# Patient Record
Sex: MALE | Race: OTHER | Employment: OTHER
[De-identification: names, ages, dates, MRNs, and addresses within clinical notes are randomized per-mention and may not be internally consistent; named-entity substitution may affect disease eponyms.]

---

## 2017-02-14 ENCOUNTER — OFFICE VISIT (OUTPATIENT)
Facility: CLINIC | Age: 77
End: 2017-02-14

## 2017-02-14 VITALS
WEIGHT: 191 LBS | TEMPERATURE: 98 F | SYSTOLIC BLOOD PRESSURE: 130 MMHG | HEART RATE: 115 BPM | OXYGEN SATURATION: 97 % | DIASTOLIC BLOOD PRESSURE: 68 MMHG | BODY MASS INDEX: 29.04 KG/M2

## 2017-02-14 DIAGNOSIS — Z79.4 TYPE 2 DIABETES MELLITUS WITHOUT COMPLICATION, WITH LONG-TERM CURRENT USE OF INSULIN (HCC): ICD-10-CM

## 2017-02-14 DIAGNOSIS — E78.2 MIXED HYPERLIPIDEMIA: ICD-10-CM

## 2017-02-14 DIAGNOSIS — I10 ESSENTIAL HYPERTENSION: ICD-10-CM

## 2017-02-14 DIAGNOSIS — J20.9 ACUTE BRONCHITIS, UNSPECIFIED ORGANISM: Primary | ICD-10-CM

## 2017-02-14 DIAGNOSIS — R05.9 COUGH: ICD-10-CM

## 2017-02-14 DIAGNOSIS — E66.3 OVERWEIGHT (BMI 25.0-29.9): ICD-10-CM

## 2017-02-14 DIAGNOSIS — M15.9 PRIMARY OSTEOARTHRITIS INVOLVING MULTIPLE JOINTS: ICD-10-CM

## 2017-02-14 DIAGNOSIS — E11.9 TYPE 2 DIABETES MELLITUS WITHOUT COMPLICATION, WITH LONG-TERM CURRENT USE OF INSULIN (HCC): ICD-10-CM

## 2017-02-14 PROCEDURE — 3046F HEMOGLOBIN A1C LEVEL >9.0%: CPT | Performed by: FAMILY MEDICINE

## 2017-02-14 PROCEDURE — 99213 OFFICE O/P EST LOW 20 MIN: CPT | Performed by: FAMILY MEDICINE

## 2017-02-14 PROCEDURE — G8598 ASA/ANTIPLAT THER USED: HCPCS | Performed by: FAMILY MEDICINE

## 2017-02-14 PROCEDURE — G8482 FLU IMMUNIZE ORDER/ADMIN: HCPCS | Performed by: FAMILY MEDICINE

## 2017-02-14 PROCEDURE — 4040F PNEUMOC VAC/ADMIN/RCVD: CPT | Performed by: FAMILY MEDICINE

## 2017-02-14 PROCEDURE — G8420 CALC BMI NORM PARAMETERS: HCPCS | Performed by: FAMILY MEDICINE

## 2017-02-14 PROCEDURE — 1123F ACP DISCUSS/DSCN MKR DOCD: CPT | Performed by: FAMILY MEDICINE

## 2017-02-14 PROCEDURE — 3017F COLORECTAL CA SCREEN DOC REV: CPT | Performed by: FAMILY MEDICINE

## 2017-02-14 PROCEDURE — G8427 DOCREV CUR MEDS BY ELIG CLIN: HCPCS | Performed by: FAMILY MEDICINE

## 2017-02-14 PROCEDURE — 1036F TOBACCO NON-USER: CPT | Performed by: FAMILY MEDICINE

## 2017-02-14 RX ORDER — AZITHROMYCIN 250 MG/1
TABLET, FILM COATED ORAL
Qty: 1 PACKET | Refills: 0 | Status: SHIPPED | OUTPATIENT
Start: 2017-02-14 | End: 2017-02-24

## 2017-03-06 ENCOUNTER — OFFICE VISIT (OUTPATIENT)
Facility: CLINIC | Age: 77
End: 2017-03-06

## 2017-03-06 VITALS
HEART RATE: 67 BPM | WEIGHT: 195 LBS | DIASTOLIC BLOOD PRESSURE: 60 MMHG | OXYGEN SATURATION: 97 % | TEMPERATURE: 97.5 F | SYSTOLIC BLOOD PRESSURE: 130 MMHG | BODY MASS INDEX: 29.65 KG/M2

## 2017-03-06 DIAGNOSIS — I25.10 CORONARY ARTERY DISEASE INVOLVING NATIVE CORONARY ARTERY OF NATIVE HEART WITHOUT ANGINA PECTORIS: ICD-10-CM

## 2017-03-06 DIAGNOSIS — Z00.00 MEDICARE ANNUAL WELLNESS VISIT, INITIAL: Primary | ICD-10-CM

## 2017-03-06 DIAGNOSIS — I10 ESSENTIAL HYPERTENSION: ICD-10-CM

## 2017-03-06 DIAGNOSIS — E66.3 OVERWEIGHT (BMI 25.0-29.9): ICD-10-CM

## 2017-03-06 DIAGNOSIS — M15.9 PRIMARY OSTEOARTHRITIS INVOLVING MULTIPLE JOINTS: ICD-10-CM

## 2017-03-06 DIAGNOSIS — E78.2 MIXED HYPERLIPIDEMIA: ICD-10-CM

## 2017-03-06 DIAGNOSIS — Z00.00 ROUTINE GENERAL MEDICAL EXAMINATION AT A HEALTH CARE FACILITY: ICD-10-CM

## 2017-03-06 DIAGNOSIS — E11.9 TYPE 2 DIABETES MELLITUS WITHOUT COMPLICATION, WITH LONG-TERM CURRENT USE OF INSULIN (HCC): ICD-10-CM

## 2017-03-06 DIAGNOSIS — Z79.4 TYPE 2 DIABETES MELLITUS WITHOUT COMPLICATION, WITH LONG-TERM CURRENT USE OF INSULIN (HCC): ICD-10-CM

## 2017-03-06 DIAGNOSIS — Z23 NEED FOR PROPHYLACTIC VACCINATION AGAINST STREPTOCOCCUS PNEUMONIAE (PNEUMOCOCCUS): ICD-10-CM

## 2017-03-06 LAB — HBA1C MFR BLD: 8.3 %

## 2017-03-06 PROCEDURE — G0438 PPPS, INITIAL VISIT: HCPCS | Performed by: FAMILY MEDICINE

## 2017-03-06 PROCEDURE — 90670 PCV13 VACCINE IM: CPT | Performed by: FAMILY MEDICINE

## 2017-03-06 PROCEDURE — G0009 ADMIN PNEUMOCOCCAL VACCINE: HCPCS | Performed by: FAMILY MEDICINE

## 2017-03-06 PROCEDURE — 83036 HEMOGLOBIN GLYCOSYLATED A1C: CPT | Performed by: FAMILY MEDICINE

## 2017-03-06 RX ORDER — LEVOTHYROXINE SODIUM 88 UG/1
TABLET ORAL
COMMUNITY
Start: 2017-01-27 | End: 2017-09-06 | Stop reason: SDUPTHER

## 2017-03-06 ASSESSMENT — LIFESTYLE VARIABLES: HOW OFTEN DO YOU HAVE A DRINK CONTAINING ALCOHOL: 0

## 2017-03-06 ASSESSMENT — PATIENT HEALTH QUESTIONNAIRE - PHQ9: SUM OF ALL RESPONSES TO PHQ QUESTIONS 1-9: 0

## 2017-03-06 ASSESSMENT — ANXIETY QUESTIONNAIRES: GAD7 TOTAL SCORE: 0

## 2017-07-12 ENCOUNTER — HOSPITAL ENCOUNTER (OUTPATIENT)
Age: 77
Discharge: HOME | End: 2017-07-12
Payer: MEDICARE

## 2017-07-12 LAB — PROSTATE SPECIFIC ANTIGEN: 0.45 UG/L

## 2017-07-12 PROCEDURE — 36415 COLL VENOUS BLD VENIPUNCTURE: CPT

## 2017-07-12 PROCEDURE — 84153 ASSAY OF PSA TOTAL: CPT

## 2017-11-27 ENCOUNTER — HOSPITAL ENCOUNTER (OUTPATIENT)
Age: 77
Discharge: HOME | End: 2017-11-27
Payer: MEDICARE

## 2017-11-27 DIAGNOSIS — Z11.59 NEED FOR HEPATITIS C SCREENING TEST: ICD-10-CM

## 2017-11-27 LAB
HEPATITIS C ANTIBODY: NONREACTIVE
VITAMIN D 25-HYDROXY: 39.4 NG/ML (ref 30–100)

## 2017-11-27 PROCEDURE — 84439 ASSAY OF FREE THYROXINE: CPT

## 2017-11-27 PROCEDURE — 36415 COLL VENOUS BLD VENIPUNCTURE: CPT

## 2017-11-27 PROCEDURE — 82043 UR ALBUMIN QUANTITATIVE: CPT

## 2017-11-27 PROCEDURE — 82570 ASSAY OF URINE CREATININE: CPT

## 2017-11-27 PROCEDURE — 80053 COMPREHEN METABOLIC PANEL: CPT

## 2017-11-27 PROCEDURE — 86803 HEPATITIS C AB TEST: CPT

## 2017-11-27 PROCEDURE — 84443 ASSAY THYROID STIM HORMONE: CPT

## 2017-11-27 PROCEDURE — 82306 VITAMIN D 25 HYDROXY: CPT

## 2017-11-27 PROCEDURE — 80061 LIPID PANEL: CPT

## 2019-01-04 ENCOUNTER — HOSPITAL ENCOUNTER (OUTPATIENT)
Age: 79
Discharge: HOME | End: 2019-01-04
Payer: MEDICARE

## 2019-01-04 LAB
ALBUMIN SERPL-MCNC: 4.1 G/DL (ref 3.5–5.2)
ALBUMIN/GLOBULIN RATIO: ABNORMAL (ref 1–2.5)
ALP BLD-CCNC: 59 U/L (ref 40–129)
ALT SERPL-CCNC: 22 U/L (ref 5–41)
ANION GAP SERPL CALCULATED.3IONS-SCNC: 12 MMOL/L (ref 9–17)
AST SERPL-CCNC: 18 U/L
BILIRUB SERPL-MCNC: 0.73 MG/DL (ref 0.3–1.2)
BUN BLDV-MCNC: 20 MG/DL (ref 8–23)
BUN/CREAT BLD: ABNORMAL (ref 9–20)
CALCIUM SERPL-MCNC: 9.1 MG/DL (ref 8.6–10.4)
CHLORIDE BLD-SCNC: 101 MMOL/L (ref 98–107)
CHOLESTEROL/HDL RATIO: 2.7
CHOLESTEROL: 142 MG/DL
CO2: 25 MMOL/L (ref 20–31)
CREAT SERPL-MCNC: 1.3 MG/DL (ref 0.7–1.2)
CREATININE URINE: 172.8 MG/DL (ref 39–259)
GFR AFRICAN AMERICAN: >60 ML/MIN
GFR NON-AFRICAN AMERICAN: 54 ML/MIN
GFR SERPL CREATININE-BSD FRML MDRD: ABNORMAL ML/MIN/{1.73_M2}
GFR SERPL CREATININE-BSD FRML MDRD: ABNORMAL ML/MIN/{1.73_M2}
GLUCOSE BLD-MCNC: 90 MG/DL (ref 70–99)
HCT VFR BLD CALC: 41.2 % (ref 41–53)
HDLC SERPL-MCNC: 53 MG/DL
HEMOGLOBIN: 13.7 G/DL (ref 13.5–17.5)
LDL CHOLESTEROL: 72 MG/DL (ref 0–130)
MCH RBC QN AUTO: 30.6 PG (ref 26–34)
MCHC RBC AUTO-ENTMCNC: 33.3 G/DL (ref 31–37)
MCV RBC AUTO: 91.9 FL (ref 80–100)
MICROALBUMIN/CREAT 24H UR: 14 MG/L
MICROALBUMIN/CREAT UR-RTO: 8 MCG/MG CREAT
NRBC AUTOMATED: ABNORMAL PER 100 WBC
PDW BLD-RTO: 14.1 % (ref 11.5–14.9)
PLATELET # BLD: 191 K/UL (ref 150–450)
PMV BLD AUTO: 7.5 FL (ref 6–12)
POTASSIUM SERPL-SCNC: 4.2 MMOL/L (ref 3.7–5.3)
PROSTATE SPECIFIC ANTIGEN: 0.64 UG/L
RBC # BLD: 4.49 M/UL (ref 4.5–5.9)
SODIUM BLD-SCNC: 138 MMOL/L (ref 135–144)
THYROXINE, FREE: 1.27 NG/DL (ref 0.93–1.7)
TOTAL PROTEIN: 7.3 G/DL (ref 6.4–8.3)
TRIGL SERPL-MCNC: 87 MG/DL
TSH SERPL DL<=0.05 MIU/L-ACNC: 5.38 MIU/L (ref 0.3–5)
VITAMIN B-12: 650 PG/ML (ref 232–1245)
VITAMIN D 25-HYDROXY: 47.6 NG/ML (ref 30–100)
VLDLC SERPL CALC-MCNC: NORMAL MG/DL (ref 1–30)
WBC # BLD: 5.7 K/UL (ref 3.5–11)

## 2019-01-04 PROCEDURE — 80053 COMPREHEN METABOLIC PANEL: CPT

## 2019-01-04 PROCEDURE — 82607 VITAMIN B-12: CPT

## 2019-01-04 PROCEDURE — 82306 VITAMIN D 25 HYDROXY: CPT

## 2019-01-04 PROCEDURE — 84439 ASSAY OF FREE THYROXINE: CPT

## 2019-01-04 PROCEDURE — 84443 ASSAY THYROID STIM HORMONE: CPT

## 2019-01-04 PROCEDURE — G0103 PSA SCREENING: HCPCS

## 2019-01-04 PROCEDURE — 80061 LIPID PANEL: CPT

## 2019-01-04 PROCEDURE — 85027 COMPLETE CBC AUTOMATED: CPT

## 2019-01-04 PROCEDURE — 82570 ASSAY OF URINE CREATININE: CPT

## 2019-01-04 PROCEDURE — 36415 COLL VENOUS BLD VENIPUNCTURE: CPT

## 2019-01-04 PROCEDURE — 82043 UR ALBUMIN QUANTITATIVE: CPT

## 2019-08-30 ENCOUNTER — HOSPITAL ENCOUNTER (EMERGENCY)
Age: 79
Discharge: HOME | End: 2019-08-30
Attending: EMERGENCY MEDICINE
Payer: MEDICARE

## 2019-08-30 ENCOUNTER — APPOINTMENT (OUTPATIENT)
Dept: GENERAL RADIOLOGY | Age: 79
End: 2019-08-30
Payer: MEDICARE

## 2019-08-30 VITALS
HEIGHT: 69 IN | DIASTOLIC BLOOD PRESSURE: 74 MMHG | TEMPERATURE: 97.7 F | BODY MASS INDEX: 28.58 KG/M2 | WEIGHT: 193 LBS | OXYGEN SATURATION: 98 % | SYSTOLIC BLOOD PRESSURE: 152 MMHG | HEART RATE: 86 BPM | RESPIRATION RATE: 16 BRPM

## 2019-08-30 DIAGNOSIS — R42 DIZZINESS: ICD-10-CM

## 2019-08-30 DIAGNOSIS — I10 HYPERTENSION, UNSPECIFIED TYPE: Primary | ICD-10-CM

## 2019-08-30 LAB
ABSOLUTE EOS #: 0.3 K/UL (ref 0–0.4)
ABSOLUTE IMMATURE GRANULOCYTE: ABNORMAL K/UL (ref 0–0.3)
ABSOLUTE LYMPH #: 1.2 K/UL (ref 1–4.8)
ABSOLUTE MONO #: 0.5 K/UL (ref 0.1–1.3)
ANION GAP SERPL CALCULATED.3IONS-SCNC: 12 MMOL/L (ref 9–17)
BASOPHILS # BLD: 0 % (ref 0–2)
BASOPHILS ABSOLUTE: 0 K/UL (ref 0–0.2)
BUN BLDV-MCNC: 16 MG/DL (ref 8–23)
BUN/CREAT BLD: ABNORMAL (ref 9–20)
CALCIUM SERPL-MCNC: 9.6 MG/DL (ref 8.6–10.4)
CHLORIDE BLD-SCNC: 104 MMOL/L (ref 98–107)
CO2: 23 MMOL/L (ref 20–31)
CREAT SERPL-MCNC: 1.03 MG/DL (ref 0.7–1.2)
DIFFERENTIAL TYPE: ABNORMAL
EOSINOPHILS RELATIVE PERCENT: 4 % (ref 0–4)
GFR AFRICAN AMERICAN: >60 ML/MIN
GFR NON-AFRICAN AMERICAN: >60 ML/MIN
GFR SERPL CREATININE-BSD FRML MDRD: ABNORMAL ML/MIN/{1.73_M2}
GFR SERPL CREATININE-BSD FRML MDRD: ABNORMAL ML/MIN/{1.73_M2}
GLUCOSE BLD-MCNC: 151 MG/DL (ref 70–99)
HCT VFR BLD CALC: 39.6 % (ref 41–53)
HEMOGLOBIN: 13.2 G/DL (ref 13.5–17.5)
IMMATURE GRANULOCYTES: ABNORMAL %
LYMPHOCYTES # BLD: 19 % (ref 24–44)
MCH RBC QN AUTO: 30.8 PG (ref 26–34)
MCHC RBC AUTO-ENTMCNC: 33.3 G/DL (ref 31–37)
MCV RBC AUTO: 92.4 FL (ref 80–100)
MONOCYTES # BLD: 8 % (ref 1–7)
NRBC AUTOMATED: ABNORMAL PER 100 WBC
PDW BLD-RTO: 14 % (ref 11.5–14.9)
PLATELET # BLD: 182 K/UL (ref 150–450)
PLATELET ESTIMATE: ABNORMAL
PMV BLD AUTO: 7 FL (ref 6–12)
POTASSIUM SERPL-SCNC: 4.5 MMOL/L (ref 3.7–5.3)
RBC # BLD: 4.29 M/UL (ref 4.5–5.9)
RBC # BLD: ABNORMAL 10*6/UL
SEG NEUTROPHILS: 69 % (ref 36–66)
SEGMENTED NEUTROPHILS ABSOLUTE COUNT: 4.5 K/UL (ref 1.3–9.1)
SODIUM BLD-SCNC: 139 MMOL/L (ref 135–144)
TROPONIN INTERP: NORMAL
TROPONIN INTERP: NORMAL
TROPONIN T: NORMAL NG/ML
TROPONIN T: NORMAL NG/ML
TROPONIN, HIGH SENSITIVITY: 14 NG/L (ref 0–22)
TROPONIN, HIGH SENSITIVITY: 15 NG/L (ref 0–22)
WBC # BLD: 6.5 K/UL (ref 3.5–11)
WBC # BLD: ABNORMAL 10*3/UL

## 2019-08-30 PROCEDURE — 80048 BASIC METABOLIC PNL TOTAL CA: CPT

## 2019-08-30 PROCEDURE — 36415 COLL VENOUS BLD VENIPUNCTURE: CPT

## 2019-08-30 PROCEDURE — 71046 X-RAY EXAM CHEST 2 VIEWS: CPT

## 2019-08-30 PROCEDURE — 84484 ASSAY OF TROPONIN QUANT: CPT

## 2019-08-30 PROCEDURE — 85025 COMPLETE CBC W/AUTO DIFF WBC: CPT

## 2019-08-30 PROCEDURE — 93005 ELECTROCARDIOGRAM TRACING: CPT | Performed by: STUDENT IN AN ORGANIZED HEALTH CARE EDUCATION/TRAINING PROGRAM

## 2019-08-30 PROCEDURE — 99284 EMERGENCY DEPT VISIT MOD MDM: CPT

## 2019-08-30 NOTE — ED NOTES
Mode of arrival: walk-in        Chief complaints: hypertension         Scenario: pt states he was experiencing lightheadedness and dizziness intermittently yesterday during the day at when he was up to the restroom at night. Pt states this morning he had some dizziness around 0800, but states since then he hasn't had any dizziness. Pt states last night he checked his blood pressure and it was reading about 170/80 pt states he never has BP readings this high. Pt states this morning he also checked his BP and had a similar reading. Pt states he was supposed to have a cardiologist appointment this morning, but didn't go because he wasn't feeling well. Pt denies any abdominal pain, chest pain, or SOB. Pt is alert and oriented x4.         C= \"Have you ever felt that you should Cut down on your drinking? \"  No  A= \"Have people Annoyed you by criticizing your drinking? \"  No  G= \"Have you ever felt bad or Guilty about your drinking? \"  No  E= \"Have you ever had a drink as an Eye-opener first thing in the morning to steady your nerves or to help a hangover? \"  No      Deferred []      Reason for deferring: N/A    *If yes to two or more: probable alcohol abuse. Ronnell Hermosillo RN  08/30/19 9455

## 2019-08-30 NOTE — ED PROVIDER NOTES
there is a 20 systolic difference. HENT: normocephalic , nose normal   EYES: no occular discharge, no scleral icterus  NECK: no JVD, no tracheal deviation  CV: Normal S1 S2, no MRG  PULM / CHEST: CTA Bilaterally all fields, no WRR  ABDOMEN: SNTND, No peritoneal signs   / ANORECTAL: deferred  MSK: no gross deformity, no edema, no TTP  NEURO:   Neurologic:  Cranial Nerves:              CNII: Visual acuity normal, Visual fields full to confrontation              CNIII, IV, VI: Pupils equal, round and reactive to light, full extraoccular movements without nystagmus              CN V: Facial sensation intact bilaterally to fine touch and pinprick, masseter 5/5              CN VII: Facial muscles symmetric and strong              CN VIII: Hears finger rub well bilaterally              CN IX: Deferred              CN X: Palate elevates symmetrically              CN XI: Full strength shoulder shrug bilaterally              CN XII: Tongue protrusion full and midline  Sensation: Sensation is intact to proprioception, two point discrimination, and light touch  Cerebellar: Heel to shin intact bilaterally  Gait: Patient's gait is normal not ataxic wide-based, no shuffling. Muscle Strength:  + 5 / 5 Strength to LUE flexion, Extension  + 5 / 5 Strength to RUE flexion , Extension  + 5 / 5 Strength to flexion & extension of Left Hip leg plantar and dorsi flexion  + 5 / 5 Strength to flexion & extension of Right Hip leg plantar and dorsi flexion  No weakness upon cervical flexion to indicate critical spinal stenosis  SKIN: no rash, no erythema, cap refill < 2 sec, No LE pitting edema, No unilateral calf swelling or TTP, Negative Rolando Sign BLE.  Pulses symmetric BUE Radial.   PSYCH / BEHAVIORAL: mood/affect normal, behavior normal, no flight of ideas        DIFFERENTIAL  DIAGNOSIS / MDM   PLAN (LABS / IMAGING / EKG):  Orders Placed This Encounter   Procedures    XR CHEST STANDARD (2 VW)    CBC Auto Differential    Basic effects. PATIENT REFERRED TO:  Rene Angel, [address] [facility]  [address] [facility],[address]  [address] [facility] [address] [phone]    Schedule an appointment as soon as possible for a visit in 2 days  Return to the ER if worsening or any other concern      DISCHARGE MEDICATIONS:  New Prescriptions    No medications on file       Dr. Margaret Mota.  1966 [facility]  Emergency Medicine Resident Physician, PGY-2    (Please note that portions of this note were completed with a voice recognition program.  Efforts were made to edit the dictations but occasionally words are mis-transcribed.)        Janett Goldmann, DO  Resident  08/30/19 0040

## 2019-08-30 NOTE — ED NOTES
Report given to Asha Murillo RN from ED. Report method in person   The following was reviewed with receiving RN:   Current vital signs:  BP (!) 183/71   Pulse 59   Temp 97.7 °F (36.5 °C) (Oral)   Resp 16   Ht 5' 9\" (1.753 m)   Wt 193 lb (87.5 kg)   SpO2 98%   BMI 28.50 kg/m²                MEWS Score: 1     Any medication or safety alerts were reviewed. Any pending diagnostics and notifications were also reviewed, as well as any safety concerns or issues, abnormal labs, abnormal imaging, and abnormal assessment findings. Questions were answered.             Victoria Moon RN  08/30/19 7341

## 2019-08-30 NOTE — ED PROVIDER NOTES
[address] ED  eMERGENCY dEPARTMENT eNCOUnter   Attending Attestation     Pt Name: Yuki Mathur  MRN: [id number]  [place] 1940  Date of evaluation: 8/30/19    History, EXAM, MDM:    Yuki Mathur is a 70 y.o. male who presents with Dizziness and Hypertension  Elevated BP. Had dizziness earlier today now resolved. No other stroke symptoms. No vision changes. No headache. No CP. No SOB. Exam BP now 150's / 74. EKG shows a sinus rhythm. HR is 63, , QRS 82, , no ROMI, No STD, diffuse TW flattening, the axis is normal.  CXR normal.  Laboraotry studies show no significant anemia. No renal failure. No electrolyte abnromalities. No troponin elevation. Pt remains asymptomatic. D/w him warning signs for urgent ED return and improtance of close outpatient follow up. Pt verbalized understanding and all questions were answered. Vitals:   Vitals:    08/30/19 1730 08/30/19 1732 08/30/19 1900 08/30/19 1936   BP: 115/66 (!) 189/66 (!) 183/71 (!) 152/74   Pulse:    86   Resp:       Temp:       TempSrc:       SpO2:  97% 98%    Weight:       Height:         I performed a history and physical examination of the patient and discussed management with the resident. I reviewed the residents note and agree with the documented findings and plan of care. Any areas of disagreement are noted on the chart. I was personally present for the key portions of any procedures. I have documented in the chart those procedures where I was not present during the key portions. I have personally reviewed all images and agree with the resident's interpretation. I have reviewed the emergency nurses triage note. I agree with the chief complaint, past medical history, past surgical history, allergies, medications, social and family history as documented unless otherwise noted below.  Documentation of the HPI, Physical Exam and Medical Decision Making performed by medical students or scribes is based on my personal performance of the HPI, PE and MDM. For Phys Assistant/ Nurse Practitioner cases/documentation I have had a face to face evaluation of this patient and have completed at least one if not all key elements of the E/M (history, physical exam, and MDM). Additional findings are as noted.     Nyla Nolasco MD  Attending Emergency  Physician                Nyla Nolasco MD  08/30/19 1954       Nyla Nolasco MD  08/30/19 2003

## 2019-08-31 LAB
EKG ATRIAL RATE: 63 BPM
EKG P AXIS: 24 DEGREES
EKG P-R INTERVAL: 174 MS
EKG Q-T INTERVAL: 394 MS
EKG QRS DURATION: 82 MS
EKG QTC CALCULATION (BAZETT): 403 MS
EKG R AXIS: -14 DEGREES
EKG T AXIS: 36 DEGREES
EKG VENTRICULAR RATE: 63 BPM

## 2019-08-31 PROCEDURE — 93010 ELECTROCARDIOGRAM REPORT: CPT | Performed by: INTERNAL MEDICINE

## 2019-09-26 ENCOUNTER — HOSPITAL ENCOUNTER (OUTPATIENT)
Dept: NUCLEAR MEDICINE | Age: 79
Discharge: HOME | End: 2019-09-28
Payer: MEDICARE

## 2019-09-26 ENCOUNTER — HOSPITAL ENCOUNTER (OUTPATIENT)
Dept: NON INVASIVE DIAGNOSTICS | Age: 79
Discharge: HOME | End: 2019-09-26
Payer: MEDICARE

## 2019-09-26 VITALS — BODY MASS INDEX: 29.86 KG/M2 | HEIGHT: 68 IN | WEIGHT: 197 LBS

## 2019-09-26 DIAGNOSIS — I25.10 CORONARY ARTERY DISEASE INVOLVING NATIVE HEART WITHOUT ANGINA PECTORIS, UNSPECIFIED VESSEL OR LESION TYPE: ICD-10-CM

## 2019-09-26 DIAGNOSIS — Z95.1 PRESENCE OF AORTOCORONARY BYPASS GRAFT: ICD-10-CM

## 2019-09-26 DIAGNOSIS — I49.3 PVC (PREMATURE VENTRICULAR CONTRACTION): ICD-10-CM

## 2019-09-26 LAB
LV EF: 50 %
LV EF: 55 %
LVEF MODALITY: NORMAL
LVEF MODALITY: NORMAL

## 2019-09-26 PROCEDURE — 6360000002 HC RX W HCPCS: Performed by: INTERNAL MEDICINE

## 2019-09-26 PROCEDURE — 93017 CV STRESS TEST TRACING ONLY: CPT

## 2019-09-26 PROCEDURE — 2580000003 HC RX 258: Performed by: INTERNAL MEDICINE

## 2019-09-26 PROCEDURE — A9500 TC99M SESTAMIBI: HCPCS | Performed by: INTERNAL MEDICINE

## 2019-09-26 PROCEDURE — C8929 TTE W OR WO FOL WCON,DOPPLER: HCPCS

## 2019-09-26 PROCEDURE — 6360000004 HC RX CONTRAST MEDICATION: Performed by: INTERNAL MEDICINE

## 2019-09-26 PROCEDURE — 78452 HT MUSCLE IMAGE SPECT MULT: CPT

## 2019-09-26 PROCEDURE — 3430000000 HC RX DIAGNOSTIC RADIOPHARMACEUTICAL: Performed by: INTERNAL MEDICINE

## 2019-09-26 RX ORDER — SODIUM CHLORIDE 9 MG/ML
500 INJECTION, SOLUTION INTRAVENOUS CONTINUOUS PRN
Status: ACTIVE | OUTPATIENT
Start: 2019-09-26 | End: 2019-09-26

## 2019-09-26 RX ORDER — ALBUTEROL SULFATE 90 UG/1
2 AEROSOL, METERED RESPIRATORY (INHALATION) PRN
Status: ACTIVE | OUTPATIENT
Start: 2019-09-26 | End: 2019-09-26

## 2019-09-26 RX ORDER — METOPROLOL TARTRATE 5 MG/5ML
5 INJECTION INTRAVENOUS EVERY 5 MIN PRN
Status: ACTIVE | OUTPATIENT
Start: 2019-09-26 | End: 2019-09-26

## 2019-09-26 RX ORDER — ATROPINE SULFATE 0.1 MG/ML
0.5 INJECTION INTRAVENOUS EVERY 5 MIN PRN
Status: ACTIVE | OUTPATIENT
Start: 2019-09-26 | End: 2019-09-26

## 2019-09-26 RX ORDER — NITROGLYCERIN 0.4 MG/1
0.4 TABLET SUBLINGUAL EVERY 5 MIN PRN
Status: ACTIVE | OUTPATIENT
Start: 2019-09-26 | End: 2019-09-26

## 2019-09-26 RX ORDER — SODIUM CHLORIDE 0.9 % (FLUSH) 0.9 %
10 SYRINGE (ML) INJECTION PRN
Status: DISCONTINUED | OUTPATIENT
Start: 2019-09-26 | End: 2019-09-29 | Stop reason: HOSPADM

## 2019-09-26 RX ORDER — AMINOPHYLLINE DIHYDRATE 25 MG/ML
50 INJECTION, SOLUTION INTRAVENOUS PRN
Status: ACTIVE | OUTPATIENT
Start: 2019-09-26 | End: 2019-09-26

## 2019-09-26 RX ORDER — SODIUM CHLORIDE 0.9 % (FLUSH) 0.9 %
10 SYRINGE (ML) INJECTION PRN
Status: ACTIVE | OUTPATIENT
Start: 2019-09-26 | End: 2019-09-26

## 2019-09-26 RX ADMIN — REGADENOSON 0.4 MG: 0.08 INJECTION, SOLUTION INTRAVENOUS at 09:19

## 2019-09-26 RX ADMIN — TETRAKIS(2-METHOXYISOBUTYLISOCYANIDE)COPPER(I) TETRAFLUOROBORATE 37.6 MILLICURIE: 1 INJECTION, POWDER, LYOPHILIZED, FOR SOLUTION INTRAVENOUS at 09:21

## 2019-09-26 RX ADMIN — Medication 10 ML: at 08:42

## 2019-09-26 RX ADMIN — PERFLUTREN 2.2 MG: 6.52 INJECTION, SUSPENSION INTRAVENOUS at 08:17

## 2019-09-26 RX ADMIN — TETRAKIS(2-METHOXYISOBUTYLISOCYANIDE)COPPER(I) TETRAFLUOROBORATE 11.7 MILLICURIE: 1 INJECTION, POWDER, LYOPHILIZED, FOR SOLUTION INTRAVENOUS at 07:00

## 2019-09-26 RX ADMIN — Medication 10 ML: at 09:19

## 2019-09-26 RX ADMIN — Medication 10 ML: at 07:00

## 2019-09-26 NOTE — PROGRESS NOTES
PATIENT BROUGHT TO STRESS LAB WAITING ROOM, ECHOCARDIOGRAM DONE AT THIS TIME, WILL DO STRESS TEST FOLLOWING.

## 2019-09-27 NOTE — PROCEDURES
[address] [facility]                    [address] [facility]. [address]                              CARDIAC STRESS TEST    PATIENT NAME: Eliseo Lee                   :        1940  MED REC NO:   [id number]                              ROOM:  ACCOUNT NO:   [de-identified]                           ADMIT DATE: 2019  PROVIDER:     More Mendosa    DATE OF STUDY:  2019    ORDERING PROVIDER:  Melisa Anna MD  PRIMARY CARE PROVIDER:  Chu Gutierrez MD  INTERPRETING PHYSICIAN:  BEAU HARKINS MD    LEXISCAN STRESS TEST    INDICATION:  CAD    INTERPRETATION:  Resting heart rate:  61 bpm.  Resting blood pressure:  145/75 mmhg. Resting Ekg:  Normal.  Stress heart response:  Normal response. Blood pressure response:  Appropriate. Stress Ekgs:  Normal.  No chest pain during stress or recovery. No ischemic Ekg changes. IMPRESSION:  NEGATIVE LEXISCAN STRESS TEST  THE NUCLEAR SCAN TO FOLLOW.           DELMA HARKINS    D: 2019 10:13:28       T: 2019 10:14:37     JUDD/DELICIA  Job#: [id number]     Doc#: Unknown    CC:    (Retain this field even if not dictated or not decipherable)

## 2019-09-30 ENCOUNTER — HOSPITAL ENCOUNTER (OUTPATIENT)
Dept: VASCULAR LAB | Age: 79
Discharge: HOME | End: 2019-09-30
Payer: MEDICARE

## 2019-09-30 PROCEDURE — 76706 US ABDL AORTA SCREEN AAA: CPT

## 2019-09-30 PROCEDURE — 93975 VASCULAR STUDY: CPT

## 2019-10-02 ENCOUNTER — HOSPITAL ENCOUNTER (OUTPATIENT)
Age: 79
Discharge: HOME | End: 2019-10-02
Payer: MEDICARE

## 2019-10-02 LAB
ANION GAP SERPL CALCULATED.3IONS-SCNC: 12 MMOL/L (ref 9–17)
BUN BLDV-MCNC: 24 MG/DL (ref 8–23)
BUN/CREAT BLD: ABNORMAL (ref 9–20)
CALCIUM SERPL-MCNC: 9.4 MG/DL (ref 8.6–10.4)
CHLORIDE BLD-SCNC: 109 MMOL/L (ref 98–107)
CO2: 23 MMOL/L (ref 20–31)
CREAT SERPL-MCNC: 1.32 MG/DL (ref 0.7–1.2)
GFR AFRICAN AMERICAN: >60 ML/MIN
GFR NON-AFRICAN AMERICAN: 53 ML/MIN
GFR SERPL CREATININE-BSD FRML MDRD: ABNORMAL ML/MIN/{1.73_M2}
GFR SERPL CREATININE-BSD FRML MDRD: ABNORMAL ML/MIN/{1.73_M2}
GLUCOSE BLD-MCNC: 183 MG/DL (ref 70–99)
HCT VFR BLD CALC: 40.6 % (ref 41–53)
HEMOGLOBIN: 13.4 G/DL (ref 13.5–17.5)
MCH RBC QN AUTO: 30.5 PG (ref 26–34)
MCHC RBC AUTO-ENTMCNC: 33.1 G/DL (ref 31–37)
MCV RBC AUTO: 92.2 FL (ref 80–100)
NRBC AUTOMATED: ABNORMAL PER 100 WBC
PDW BLD-RTO: 14.3 % (ref 11.5–14.9)
PLATELET # BLD: 193 K/UL (ref 150–450)
PMV BLD AUTO: 7.5 FL (ref 6–12)
POTASSIUM SERPL-SCNC: 5.2 MMOL/L (ref 3.7–5.3)
RBC # BLD: 4.4 M/UL (ref 4.5–5.9)
SODIUM BLD-SCNC: 144 MMOL/L (ref 135–144)
WBC # BLD: 6.6 K/UL (ref 3.5–11)

## 2019-10-02 PROCEDURE — 85027 COMPLETE CBC AUTOMATED: CPT

## 2019-10-02 PROCEDURE — 80048 BASIC METABOLIC PNL TOTAL CA: CPT

## 2019-10-02 PROCEDURE — 36415 COLL VENOUS BLD VENIPUNCTURE: CPT

## 2019-10-04 ENCOUNTER — HOSPITAL ENCOUNTER (OUTPATIENT)
Dept: CARDIAC CATH/INVASIVE PROCEDURES | Age: 79
Setting detail: OBSERVATION
Discharge: HOME | End: 2019-10-05
Attending: INTERNAL MEDICINE | Admitting: INTERNAL MEDICINE
Payer: MEDICARE

## 2019-10-04 DIAGNOSIS — I25.10 CAD IN NATIVE ARTERY: ICD-10-CM

## 2019-10-04 DIAGNOSIS — Z98.890 STATUS POST CARDIAC CATHETERIZATION: Primary | ICD-10-CM

## 2019-10-04 DIAGNOSIS — I25.10 CORONARY ARTERY DISEASE INVOLVING NATIVE CORONARY ARTERY OF NATIVE HEART WITHOUT ANGINA PECTORIS: ICD-10-CM

## 2019-10-04 LAB
ANION GAP SERPL CALCULATED.3IONS-SCNC: 12 MMOL/L (ref 9–17)
BUN BLDV-MCNC: 18 MG/DL (ref 8–23)
BUN/CREAT BLD: 17 (ref 9–20)
CALCIUM SERPL-MCNC: 9.4 MG/DL (ref 8.6–10.4)
CHLORIDE BLD-SCNC: 103 MMOL/L (ref 98–107)
CO2: 22 MMOL/L (ref 20–31)
CREAT SERPL-MCNC: 1.09 MG/DL (ref 0.7–1.2)
GFR AFRICAN AMERICAN: >60 ML/MIN
GFR NON-AFRICAN AMERICAN: >60 ML/MIN
GFR SERPL CREATININE-BSD FRML MDRD: ABNORMAL ML/MIN/{1.73_M2}
GFR SERPL CREATININE-BSD FRML MDRD: ABNORMAL ML/MIN/{1.73_M2}
GLUCOSE BLD-MCNC: 146 MG/DL (ref 75–110)
GLUCOSE BLD-MCNC: 174 MG/DL (ref 70–99)
GLUCOSE BLD-MCNC: 208 MG/DL (ref 75–110)
PLATELET # BLD: 201 K/UL (ref 138–453)
POTASSIUM SERPL-SCNC: 4.4 MMOL/L (ref 3.7–5.3)
SODIUM BLD-SCNC: 137 MMOL/L (ref 135–144)

## 2019-10-04 PROCEDURE — 2580000003 HC RX 258: Performed by: INTERNAL MEDICINE

## 2019-10-04 PROCEDURE — 85049 AUTOMATED PLATELET COUNT: CPT

## 2019-10-04 PROCEDURE — 6370000000 HC RX 637 (ALT 250 FOR IP)

## 2019-10-04 PROCEDURE — C1874 STENT, COATED/COV W/DEL SYS: HCPCS

## 2019-10-04 PROCEDURE — 6360000002 HC RX W HCPCS

## 2019-10-04 PROCEDURE — 6370000000 HC RX 637 (ALT 250 FOR IP): Performed by: INTERNAL MEDICINE

## 2019-10-04 PROCEDURE — 93458 L HRT ARTERY/VENTRICLE ANGIO: CPT | Performed by: INTERNAL MEDICINE

## 2019-10-04 PROCEDURE — C1781 MESH (IMPLANTABLE): HCPCS

## 2019-10-04 PROCEDURE — G0378 HOSPITAL OBSERVATION PER HR: HCPCS

## 2019-10-04 PROCEDURE — C1725 CATH, TRANSLUMIN NON-LASER: HCPCS

## 2019-10-04 PROCEDURE — 2580000003 HC RX 258

## 2019-10-04 PROCEDURE — C1894 INTRO/SHEATH, NON-LASER: HCPCS

## 2019-10-04 PROCEDURE — C9604 PERC D-E COR REVASC T CABG S: HCPCS | Performed by: INTERNAL MEDICINE

## 2019-10-04 PROCEDURE — C1887 CATHETER, GUIDING: HCPCS

## 2019-10-04 PROCEDURE — 2500000003 HC RX 250 WO HCPCS

## 2019-10-04 PROCEDURE — C1769 GUIDE WIRE: HCPCS

## 2019-10-04 PROCEDURE — 82947 ASSAY GLUCOSE BLOOD QUANT: CPT

## 2019-10-04 PROCEDURE — 80048 BASIC METABOLIC PNL TOTAL CA: CPT

## 2019-10-04 PROCEDURE — 2709999900 HC NON-CHARGEABLE SUPPLY

## 2019-10-04 PROCEDURE — 6360000004 HC RX CONTRAST MEDICATION

## 2019-10-04 RX ORDER — HYDRALAZINE HYDROCHLORIDE 20 MG/ML
10 INJECTION INTRAMUSCULAR; INTRAVENOUS EVERY 10 MIN PRN
Status: DISCONTINUED | OUTPATIENT
Start: 2019-10-04 | End: 2019-10-05 | Stop reason: HOSPADM

## 2019-10-04 RX ORDER — PRAVASTATIN SODIUM 80 MG/1
80 TABLET ORAL DAILY
Status: DISCONTINUED | OUTPATIENT
Start: 2019-10-04 | End: 2019-10-05 | Stop reason: HOSPADM

## 2019-10-04 RX ORDER — SODIUM CHLORIDE 0.9 % (FLUSH) 0.9 %
10 SYRINGE (ML) INJECTION EVERY 12 HOURS SCHEDULED
Status: DISCONTINUED | OUTPATIENT
Start: 2019-10-04 | End: 2019-10-05 | Stop reason: HOSPADM

## 2019-10-04 RX ORDER — ASPIRIN 81 MG/1
81 TABLET, CHEWABLE ORAL DAILY
Status: DISCONTINUED | OUTPATIENT
Start: 2019-10-04 | End: 2019-10-05 | Stop reason: HOSPADM

## 2019-10-04 RX ORDER — SODIUM CHLORIDE 9 MG/ML
INJECTION, SOLUTION INTRAVENOUS CONTINUOUS
Status: DISCONTINUED | OUTPATIENT
Start: 2019-10-04 | End: 2019-10-04 | Stop reason: SDUPTHER

## 2019-10-04 RX ORDER — M-VIT,TX,IRON,MINS/CALC/FOLIC 27MG-0.4MG
1 TABLET ORAL DAILY
Status: DISCONTINUED | OUTPATIENT
Start: 2019-10-05 | End: 2019-10-05 | Stop reason: HOSPADM

## 2019-10-04 RX ORDER — LEVOTHYROXINE SODIUM 88 UG/1
88 TABLET ORAL DAILY
Status: DISCONTINUED | OUTPATIENT
Start: 2019-10-05 | End: 2019-10-05 | Stop reason: HOSPADM

## 2019-10-04 RX ORDER — ACETAMINOPHEN 325 MG/1
650 TABLET ORAL EVERY 4 HOURS PRN
Status: DISCONTINUED | OUTPATIENT
Start: 2019-10-04 | End: 2019-10-05 | Stop reason: HOSPADM

## 2019-10-04 RX ORDER — MORPHINE SULFATE 2 MG/ML
2 INJECTION, SOLUTION INTRAMUSCULAR; INTRAVENOUS
Status: ACTIVE | OUTPATIENT
Start: 2019-10-04 | End: 2019-10-04

## 2019-10-04 RX ORDER — SODIUM CHLORIDE 0.9 % (FLUSH) 0.9 %
10 SYRINGE (ML) INJECTION PRN
Status: DISCONTINUED | OUTPATIENT
Start: 2019-10-04 | End: 2019-10-05 | Stop reason: HOSPADM

## 2019-10-04 RX ORDER — LOSARTAN POTASSIUM 100 MG/1
100 TABLET ORAL DAILY
Status: DISCONTINUED | OUTPATIENT
Start: 2019-10-05 | End: 2019-10-05 | Stop reason: HOSPADM

## 2019-10-04 RX ORDER — 0.9 % SODIUM CHLORIDE 0.9 %
500 INTRAVENOUS SOLUTION INTRAVENOUS PRN
Status: DISCONTINUED | OUTPATIENT
Start: 2019-10-04 | End: 2019-10-05 | Stop reason: HOSPADM

## 2019-10-04 RX ORDER — SODIUM CHLORIDE 9 MG/ML
INJECTION, SOLUTION INTRAVENOUS CONTINUOUS
Status: DISCONTINUED | OUTPATIENT
Start: 2019-10-04 | End: 2019-10-05 | Stop reason: HOSPADM

## 2019-10-04 RX ADMIN — SODIUM CHLORIDE: 9 INJECTION, SOLUTION INTRAVENOUS at 18:00

## 2019-10-04 RX ADMIN — SODIUM CHLORIDE: 9 INJECTION, SOLUTION INTRAVENOUS at 12:00

## 2019-10-04 RX ADMIN — TICAGRELOR 90 MG: 90 TABLET ORAL at 20:37

## 2019-10-04 ASSESSMENT — PAIN SCALES - GENERAL
PAINLEVEL_OUTOF10: 0

## 2019-10-04 ASSESSMENT — PAIN - FUNCTIONAL ASSESSMENT: PAIN_FUNCTIONAL_ASSESSMENT: 0-10

## 2019-10-05 VITALS
RESPIRATION RATE: 18 BRPM | HEART RATE: 76 BPM | SYSTOLIC BLOOD PRESSURE: 112 MMHG | BODY MASS INDEX: 30.4 KG/M2 | DIASTOLIC BLOOD PRESSURE: 62 MMHG | TEMPERATURE: 97.7 F | OXYGEN SATURATION: 95 % | HEIGHT: 68 IN | WEIGHT: 200.6 LBS

## 2019-10-05 LAB
ANION GAP SERPL CALCULATED.3IONS-SCNC: 11 MMOL/L (ref 9–17)
BUN BLDV-MCNC: 15 MG/DL (ref 8–23)
BUN/CREAT BLD: 15 (ref 9–20)
CALCIUM SERPL-MCNC: 8.5 MG/DL (ref 8.6–10.4)
CHLORIDE BLD-SCNC: 101 MMOL/L (ref 98–107)
CO2: 25 MMOL/L (ref 20–31)
CREAT SERPL-MCNC: 1 MG/DL (ref 0.7–1.2)
GFR AFRICAN AMERICAN: >60 ML/MIN
GFR NON-AFRICAN AMERICAN: >60 ML/MIN
GFR SERPL CREATININE-BSD FRML MDRD: ABNORMAL ML/MIN/{1.73_M2}
GFR SERPL CREATININE-BSD FRML MDRD: ABNORMAL ML/MIN/{1.73_M2}
GLUCOSE BLD-MCNC: 192 MG/DL (ref 75–110)
GLUCOSE BLD-MCNC: 76 MG/DL (ref 70–99)
GLUCOSE BLD-MCNC: 97 MG/DL (ref 75–110)
HCT VFR BLD CALC: 37.5 % (ref 40.7–50.3)
HEMOGLOBIN: 12.3 G/DL (ref 13–17)
MCH RBC QN AUTO: 30 PG (ref 25.2–33.5)
MCHC RBC AUTO-ENTMCNC: 32.8 G/DL (ref 28.4–34.8)
MCV RBC AUTO: 91.5 FL (ref 82.6–102.9)
NRBC AUTOMATED: 0 PER 100 WBC
PDW BLD-RTO: 13.3 % (ref 11.8–14.4)
PLATELET # BLD: 180 K/UL (ref 138–453)
PMV BLD AUTO: 9.4 FL (ref 8.1–13.5)
POTASSIUM SERPL-SCNC: 4 MMOL/L (ref 3.7–5.3)
RBC # BLD: 4.1 M/UL (ref 4.21–5.77)
SODIUM BLD-SCNC: 137 MMOL/L (ref 135–144)
WBC # BLD: 8 K/UL (ref 3.5–11.3)

## 2019-10-05 PROCEDURE — 2709999900 HC NON-CHARGEABLE SUPPLY

## 2019-10-05 PROCEDURE — 2580000003 HC RX 258: Performed by: INTERNAL MEDICINE

## 2019-10-05 PROCEDURE — 80048 BASIC METABOLIC PNL TOTAL CA: CPT

## 2019-10-05 PROCEDURE — C1725 CATH, TRANSLUMIN NON-LASER: HCPCS

## 2019-10-05 PROCEDURE — 36415 COLL VENOUS BLD VENIPUNCTURE: CPT

## 2019-10-05 PROCEDURE — 85027 COMPLETE CBC AUTOMATED: CPT

## 2019-10-05 PROCEDURE — C1769 GUIDE WIRE: HCPCS

## 2019-10-05 PROCEDURE — 82947 ASSAY GLUCOSE BLOOD QUANT: CPT

## 2019-10-05 PROCEDURE — 6370000000 HC RX 637 (ALT 250 FOR IP): Performed by: INTERNAL MEDICINE

## 2019-10-05 PROCEDURE — C1894 INTRO/SHEATH, NON-LASER: HCPCS

## 2019-10-05 PROCEDURE — G0378 HOSPITAL OBSERVATION PER HR: HCPCS

## 2019-10-05 PROCEDURE — C1781 MESH (IMPLANTABLE): HCPCS

## 2019-10-05 PROCEDURE — C1874 STENT, COATED/COV W/DEL SYS: HCPCS

## 2019-10-05 PROCEDURE — C1887 CATHETER, GUIDING: HCPCS

## 2019-10-05 RX ADMIN — VITAMIN D, TAB 1000IU (100/BT) 1000 UNITS: 25 TAB at 09:00

## 2019-10-05 RX ADMIN — SODIUM CHLORIDE, PRESERVATIVE FREE 10 ML: 5 INJECTION INTRAVENOUS at 09:00

## 2019-10-05 RX ADMIN — LOSARTAN POTASSIUM 100 MG: 100 TABLET, FILM COATED ORAL at 09:00

## 2019-10-05 RX ADMIN — ASPIRIN 81 MG 81 MG: 81 TABLET ORAL at 09:00

## 2019-10-05 RX ADMIN — MULTIPLE VITAMINS W/ MINERALS TAB 1 TABLET: TAB at 09:00

## 2019-10-05 RX ADMIN — TICAGRELOR 90 MG: 90 TABLET ORAL at 12:59

## 2019-10-05 ASSESSMENT — PAIN SCALES - GENERAL
PAINLEVEL_OUTOF10: 0

## 2019-10-14 ENCOUNTER — FOLLOWUP TELEPHONE ENCOUNTER (OUTPATIENT)
Dept: CARDIOVASCULAR ICU | Age: 79
End: 2019-10-14

## 2019-10-14 ENCOUNTER — HOSPITAL ENCOUNTER (OUTPATIENT)
Dept: CARDIAC REHAB | Age: 79
Setting detail: THERAPIES SERIES
Discharge: HOME | End: 2019-10-14
Payer: MEDICARE

## 2019-10-14 VITALS — HEIGHT: 68 IN | WEIGHT: 197 LBS | BODY MASS INDEX: 29.86 KG/M2

## 2019-10-14 PROCEDURE — 93798 PHYS/QHP OP CAR RHAB W/ECG: CPT

## 2019-10-14 ASSESSMENT — PATIENT HEALTH QUESTIONNAIRE - PHQ9: SUM OF ALL RESPONSES TO PHQ QUESTIONS 1-9: 0

## 2019-10-16 ENCOUNTER — HOSPITAL ENCOUNTER (OUTPATIENT)
Dept: CARDIAC REHAB | Age: 79
Setting detail: THERAPIES SERIES
Discharge: HOME | End: 2019-10-16
Payer: MEDICARE

## 2019-10-16 VITALS — WEIGHT: 196 LBS | BODY MASS INDEX: 29.8 KG/M2

## 2019-10-16 PROCEDURE — 93798 PHYS/QHP OP CAR RHAB W/ECG: CPT

## 2019-10-18 ENCOUNTER — HOSPITAL ENCOUNTER (OUTPATIENT)
Dept: CARDIAC REHAB | Age: 79
Setting detail: THERAPIES SERIES
Discharge: HOME | End: 2019-10-18
Payer: MEDICARE

## 2019-10-18 VITALS — BODY MASS INDEX: 29.65 KG/M2 | WEIGHT: 195 LBS

## 2019-10-18 PROCEDURE — 93798 PHYS/QHP OP CAR RHAB W/ECG: CPT

## 2019-10-21 ENCOUNTER — HOSPITAL ENCOUNTER (OUTPATIENT)
Dept: CARDIAC REHAB | Age: 79
Setting detail: THERAPIES SERIES
Discharge: HOME | End: 2019-10-21
Payer: MEDICARE

## 2019-10-21 VITALS — WEIGHT: 197 LBS | BODY MASS INDEX: 29.95 KG/M2

## 2019-10-21 PROCEDURE — 93798 PHYS/QHP OP CAR RHAB W/ECG: CPT

## 2019-10-23 ENCOUNTER — HOSPITAL ENCOUNTER (OUTPATIENT)
Dept: CARDIAC REHAB | Age: 79
Setting detail: THERAPIES SERIES
Discharge: HOME | End: 2019-10-23
Payer: MEDICARE

## 2019-10-23 VITALS — WEIGHT: 197 LBS | BODY MASS INDEX: 29.95 KG/M2

## 2019-10-23 PROCEDURE — 93798 PHYS/QHP OP CAR RHAB W/ECG: CPT

## 2019-10-24 ENCOUNTER — HOSPITAL ENCOUNTER (OUTPATIENT)
Age: 79
Discharge: HOME | End: 2019-10-24
Payer: MEDICARE

## 2019-10-24 LAB
ANION GAP SERPL CALCULATED.3IONS-SCNC: 12 MMOL/L (ref 9–17)
BUN BLDV-MCNC: 22 MG/DL (ref 8–23)
BUN/CREAT BLD: ABNORMAL (ref 9–20)
CALCIUM SERPL-MCNC: 9.6 MG/DL (ref 8.6–10.4)
CHLORIDE BLD-SCNC: 102 MMOL/L (ref 98–107)
CO2: 23 MMOL/L (ref 20–31)
CREAT SERPL-MCNC: 1.13 MG/DL (ref 0.7–1.2)
GFR AFRICAN AMERICAN: >60 ML/MIN
GFR NON-AFRICAN AMERICAN: >60 ML/MIN
GFR SERPL CREATININE-BSD FRML MDRD: ABNORMAL ML/MIN/{1.73_M2}
GFR SERPL CREATININE-BSD FRML MDRD: ABNORMAL ML/MIN/{1.73_M2}
GLUCOSE BLD-MCNC: 246 MG/DL (ref 70–99)
MAGNESIUM: 1.6 MG/DL (ref 1.6–2.6)
POTASSIUM SERPL-SCNC: 4.9 MMOL/L (ref 3.7–5.3)
SODIUM BLD-SCNC: 137 MMOL/L (ref 135–144)

## 2019-10-24 PROCEDURE — 36415 COLL VENOUS BLD VENIPUNCTURE: CPT

## 2019-10-24 PROCEDURE — 80048 BASIC METABOLIC PNL TOTAL CA: CPT

## 2019-10-24 PROCEDURE — 83735 ASSAY OF MAGNESIUM: CPT

## 2019-10-25 ENCOUNTER — HOSPITAL ENCOUNTER (OUTPATIENT)
Dept: CARDIAC REHAB | Age: 79
Setting detail: THERAPIES SERIES
Discharge: HOME | End: 2019-10-25
Payer: MEDICARE

## 2019-10-25 VITALS — WEIGHT: 197 LBS | BODY MASS INDEX: 29.95 KG/M2

## 2019-10-25 PROCEDURE — 93798 PHYS/QHP OP CAR RHAB W/ECG: CPT

## 2019-10-28 ENCOUNTER — HOSPITAL ENCOUNTER (OUTPATIENT)
Dept: CARDIAC REHAB | Age: 79
Setting detail: THERAPIES SERIES
Discharge: HOME | End: 2019-10-28
Payer: MEDICARE

## 2019-10-28 VITALS — BODY MASS INDEX: 30.11 KG/M2 | WEIGHT: 198 LBS

## 2019-10-28 PROCEDURE — 93798 PHYS/QHP OP CAR RHAB W/ECG: CPT

## 2019-10-30 ENCOUNTER — HOSPITAL ENCOUNTER (OUTPATIENT)
Dept: CARDIAC REHAB | Age: 79
Setting detail: THERAPIES SERIES
Discharge: HOME | End: 2019-10-30
Payer: MEDICARE

## 2019-10-30 VITALS — WEIGHT: 197 LBS | BODY MASS INDEX: 29.95 KG/M2

## 2019-10-30 PROCEDURE — 93798 PHYS/QHP OP CAR RHAB W/ECG: CPT

## 2019-11-01 ENCOUNTER — HOSPITAL ENCOUNTER (OUTPATIENT)
Dept: CARDIAC REHAB | Age: 79
Setting detail: THERAPIES SERIES
Discharge: HOME | End: 2019-11-01
Payer: MEDICARE

## 2019-11-01 VITALS — WEIGHT: 197.4 LBS | BODY MASS INDEX: 30.01 KG/M2

## 2019-11-01 PROCEDURE — 93798 PHYS/QHP OP CAR RHAB W/ECG: CPT

## 2019-11-04 ENCOUNTER — HOSPITAL ENCOUNTER (OUTPATIENT)
Dept: CARDIAC REHAB | Age: 79
Setting detail: THERAPIES SERIES
Discharge: HOME | End: 2019-11-04
Payer: MEDICARE

## 2019-11-04 VITALS — WEIGHT: 197 LBS | BODY MASS INDEX: 29.95 KG/M2

## 2019-11-04 PROCEDURE — 93798 PHYS/QHP OP CAR RHAB W/ECG: CPT

## 2019-11-06 ENCOUNTER — HOSPITAL ENCOUNTER (OUTPATIENT)
Dept: CARDIAC REHAB | Age: 79
Setting detail: THERAPIES SERIES
Discharge: HOME | End: 2019-11-06
Payer: MEDICARE

## 2019-11-06 VITALS — WEIGHT: 197 LBS | BODY MASS INDEX: 29.95 KG/M2

## 2019-11-06 PROCEDURE — 93798 PHYS/QHP OP CAR RHAB W/ECG: CPT

## 2019-11-08 ENCOUNTER — HOSPITAL ENCOUNTER (OUTPATIENT)
Dept: CARDIAC REHAB | Age: 79
Setting detail: THERAPIES SERIES
Discharge: HOME | End: 2019-11-08
Payer: MEDICARE

## 2019-11-08 VITALS — WEIGHT: 198 LBS | BODY MASS INDEX: 30.11 KG/M2

## 2019-11-08 PROCEDURE — 93798 PHYS/QHP OP CAR RHAB W/ECG: CPT

## 2019-11-11 ENCOUNTER — HOSPITAL ENCOUNTER (OUTPATIENT)
Dept: CARDIAC REHAB | Age: 79
Setting detail: THERAPIES SERIES
Discharge: HOME | End: 2019-11-11
Payer: MEDICARE

## 2019-11-11 VITALS — WEIGHT: 198 LBS | BODY MASS INDEX: 30.11 KG/M2

## 2019-11-11 PROCEDURE — 93798 PHYS/QHP OP CAR RHAB W/ECG: CPT

## 2019-11-13 ENCOUNTER — HOSPITAL ENCOUNTER (OUTPATIENT)
Dept: CARDIAC REHAB | Age: 79
Setting detail: THERAPIES SERIES
Discharge: HOME | End: 2019-11-13
Payer: MEDICARE

## 2019-11-13 VITALS — BODY MASS INDEX: 29.95 KG/M2 | WEIGHT: 197 LBS

## 2019-11-13 PROCEDURE — 93798 PHYS/QHP OP CAR RHAB W/ECG: CPT

## 2019-11-15 ENCOUNTER — HOSPITAL ENCOUNTER (OUTPATIENT)
Dept: CARDIAC REHAB | Age: 79
Setting detail: THERAPIES SERIES
Discharge: HOME | End: 2019-11-15
Payer: MEDICARE

## 2019-11-18 ENCOUNTER — HOSPITAL ENCOUNTER (OUTPATIENT)
Dept: CARDIAC REHAB | Age: 79
Setting detail: THERAPIES SERIES
Discharge: HOME | End: 2019-11-18
Payer: MEDICARE

## 2019-11-18 VITALS — HEIGHT: 68 IN | WEIGHT: 198 LBS | BODY MASS INDEX: 30.01 KG/M2

## 2019-11-18 PROCEDURE — 93798 PHYS/QHP OP CAR RHAB W/ECG: CPT

## 2019-11-18 RX ORDER — MAGNESIUM OXIDE 400 MG/1
400 TABLET ORAL DAILY
COMMUNITY
End: 2022-05-19

## 2019-11-20 ENCOUNTER — APPOINTMENT (OUTPATIENT)
Dept: CARDIAC REHAB | Age: 79
End: 2019-11-20
Payer: MEDICARE

## 2019-11-22 ENCOUNTER — APPOINTMENT (OUTPATIENT)
Dept: CARDIAC REHAB | Age: 79
End: 2019-11-22
Payer: MEDICARE

## 2019-11-25 ENCOUNTER — HOSPITAL ENCOUNTER (OUTPATIENT)
Dept: CARDIAC REHAB | Age: 79
Setting detail: THERAPIES SERIES
Discharge: HOME | End: 2019-11-25
Payer: MEDICARE

## 2019-11-27 ENCOUNTER — APPOINTMENT (OUTPATIENT)
Dept: CARDIAC REHAB | Age: 79
End: 2019-11-27
Payer: MEDICARE

## 2019-11-29 ENCOUNTER — APPOINTMENT (OUTPATIENT)
Dept: CARDIAC REHAB | Age: 79
End: 2019-11-29
Payer: MEDICARE

## 2019-12-02 ENCOUNTER — APPOINTMENT (OUTPATIENT)
Dept: CARDIAC REHAB | Age: 79
End: 2019-12-02
Payer: MEDICARE

## 2019-12-04 ENCOUNTER — HOSPITAL ENCOUNTER (OUTPATIENT)
Dept: CARDIAC REHAB | Age: 79
Setting detail: THERAPIES SERIES
Discharge: HOME | End: 2019-12-04
Payer: MEDICARE

## 2019-12-04 VITALS — BODY MASS INDEX: 29.95 KG/M2 | WEIGHT: 197 LBS

## 2019-12-04 PROCEDURE — 93798 PHYS/QHP OP CAR RHAB W/ECG: CPT

## 2019-12-06 ENCOUNTER — HOSPITAL ENCOUNTER (OUTPATIENT)
Dept: CARDIAC REHAB | Age: 79
Setting detail: THERAPIES SERIES
Discharge: HOME | End: 2019-12-06
Payer: MEDICARE

## 2019-12-06 VITALS — WEIGHT: 195.5 LBS | BODY MASS INDEX: 29.73 KG/M2

## 2019-12-06 PROCEDURE — 93798 PHYS/QHP OP CAR RHAB W/ECG: CPT

## 2019-12-09 ENCOUNTER — HOSPITAL ENCOUNTER (OUTPATIENT)
Dept: CARDIAC REHAB | Age: 79
Setting detail: THERAPIES SERIES
Discharge: HOME | End: 2019-12-09
Payer: MEDICARE

## 2019-12-09 VITALS — WEIGHT: 196 LBS | BODY MASS INDEX: 29.8 KG/M2

## 2019-12-09 PROCEDURE — 93798 PHYS/QHP OP CAR RHAB W/ECG: CPT

## 2019-12-11 ENCOUNTER — HOSPITAL ENCOUNTER (OUTPATIENT)
Dept: CARDIAC REHAB | Age: 79
Setting detail: THERAPIES SERIES
Discharge: HOME | End: 2019-12-11
Payer: MEDICARE

## 2019-12-11 VITALS — WEIGHT: 195.7 LBS | BODY MASS INDEX: 29.76 KG/M2

## 2019-12-11 PROCEDURE — 93798 PHYS/QHP OP CAR RHAB W/ECG: CPT

## 2019-12-13 ENCOUNTER — HOSPITAL ENCOUNTER (OUTPATIENT)
Dept: CARDIAC REHAB | Age: 79
Setting detail: THERAPIES SERIES
Discharge: HOME | End: 2019-12-13
Payer: MEDICARE

## 2019-12-13 VITALS — WEIGHT: 196 LBS | BODY MASS INDEX: 29.8 KG/M2

## 2019-12-13 PROCEDURE — 93798 PHYS/QHP OP CAR RHAB W/ECG: CPT

## 2019-12-16 ENCOUNTER — HOSPITAL ENCOUNTER (OUTPATIENT)
Dept: CARDIAC REHAB | Age: 79
Setting detail: THERAPIES SERIES
Discharge: HOME | End: 2019-12-16
Payer: MEDICARE

## 2019-12-16 VITALS — WEIGHT: 196 LBS | BODY MASS INDEX: 29.8 KG/M2

## 2019-12-16 PROCEDURE — 93798 PHYS/QHP OP CAR RHAB W/ECG: CPT

## 2019-12-18 ENCOUNTER — HOSPITAL ENCOUNTER (OUTPATIENT)
Dept: CARDIAC REHAB | Age: 79
Setting detail: THERAPIES SERIES
Discharge: HOME | End: 2019-12-18
Payer: MEDICARE

## 2019-12-18 VITALS — BODY MASS INDEX: 29.61 KG/M2 | HEIGHT: 68 IN | WEIGHT: 195.4 LBS

## 2019-12-18 PROCEDURE — 93798 PHYS/QHP OP CAR RHAB W/ECG: CPT

## 2019-12-20 ENCOUNTER — HOSPITAL ENCOUNTER (OUTPATIENT)
Dept: CARDIAC REHAB | Age: 79
Setting detail: THERAPIES SERIES
Discharge: HOME | End: 2019-12-20
Payer: MEDICARE

## 2019-12-20 VITALS — BODY MASS INDEX: 29.91 KG/M2 | WEIGHT: 196.7 LBS

## 2019-12-20 PROCEDURE — 93798 PHYS/QHP OP CAR RHAB W/ECG: CPT

## 2019-12-23 ENCOUNTER — HOSPITAL ENCOUNTER (OUTPATIENT)
Dept: CARDIAC REHAB | Age: 79
Setting detail: THERAPIES SERIES
Discharge: HOME | End: 2019-12-23
Payer: MEDICARE

## 2019-12-23 VITALS — WEIGHT: 194.4 LBS | BODY MASS INDEX: 29.56 KG/M2

## 2019-12-23 PROCEDURE — 93798 PHYS/QHP OP CAR RHAB W/ECG: CPT

## 2019-12-25 ENCOUNTER — APPOINTMENT (OUTPATIENT)
Dept: CARDIAC REHAB | Age: 79
End: 2019-12-25
Payer: MEDICARE

## 2019-12-27 ENCOUNTER — HOSPITAL ENCOUNTER (OUTPATIENT)
Dept: CARDIAC REHAB | Age: 79
Setting detail: THERAPIES SERIES
Discharge: HOME | End: 2019-12-27
Payer: MEDICARE

## 2019-12-27 VITALS — WEIGHT: 195 LBS | BODY MASS INDEX: 29.65 KG/M2

## 2019-12-27 PROCEDURE — 93798 PHYS/QHP OP CAR RHAB W/ECG: CPT

## 2019-12-30 ENCOUNTER — HOSPITAL ENCOUNTER (OUTPATIENT)
Dept: CARDIAC REHAB | Age: 79
Setting detail: THERAPIES SERIES
Discharge: HOME | End: 2019-12-30
Payer: MEDICARE

## 2019-12-30 VITALS — BODY MASS INDEX: 29.5 KG/M2 | WEIGHT: 194 LBS

## 2019-12-30 PROCEDURE — 93798 PHYS/QHP OP CAR RHAB W/ECG: CPT

## 2020-01-01 ENCOUNTER — APPOINTMENT (OUTPATIENT)
Dept: CARDIAC REHAB | Age: 80
End: 2020-01-01
Payer: MEDICARE

## 2020-01-03 ENCOUNTER — HOSPITAL ENCOUNTER (OUTPATIENT)
Dept: CARDIAC REHAB | Age: 80
Setting detail: THERAPIES SERIES
Discharge: HOME | End: 2020-01-03
Payer: MEDICARE

## 2020-01-03 VITALS — BODY MASS INDEX: 29.39 KG/M2 | WEIGHT: 193.3 LBS

## 2020-01-03 PROCEDURE — 93798 PHYS/QHP OP CAR RHAB W/ECG: CPT

## 2020-01-06 ENCOUNTER — HOSPITAL ENCOUNTER (OUTPATIENT)
Dept: CARDIAC REHAB | Age: 80
Setting detail: THERAPIES SERIES
Discharge: HOME | End: 2020-01-06
Payer: MEDICARE

## 2020-01-06 VITALS — BODY MASS INDEX: 29.5 KG/M2 | WEIGHT: 194 LBS

## 2020-01-06 PROCEDURE — 93798 PHYS/QHP OP CAR RHAB W/ECG: CPT

## 2020-01-08 ENCOUNTER — HOSPITAL ENCOUNTER (OUTPATIENT)
Dept: CARDIAC REHAB | Age: 80
Setting detail: THERAPIES SERIES
Discharge: HOME | End: 2020-01-08
Payer: MEDICARE

## 2020-01-08 VITALS — BODY MASS INDEX: 29.65 KG/M2 | WEIGHT: 195 LBS

## 2020-01-08 PROCEDURE — 93798 PHYS/QHP OP CAR RHAB W/ECG: CPT

## 2020-01-10 ENCOUNTER — HOSPITAL ENCOUNTER (OUTPATIENT)
Dept: CARDIAC REHAB | Age: 80
Setting detail: THERAPIES SERIES
Discharge: HOME | End: 2020-01-10
Payer: MEDICARE

## 2020-01-10 VITALS — WEIGHT: 195 LBS | BODY MASS INDEX: 29.65 KG/M2

## 2020-01-10 PROCEDURE — 93798 PHYS/QHP OP CAR RHAB W/ECG: CPT

## 2020-01-13 ENCOUNTER — HOSPITAL ENCOUNTER (OUTPATIENT)
Dept: CARDIAC REHAB | Age: 80
Setting detail: THERAPIES SERIES
Discharge: HOME | End: 2020-01-13
Payer: MEDICARE

## 2020-01-13 VITALS — WEIGHT: 194 LBS | BODY MASS INDEX: 29.5 KG/M2

## 2020-01-13 PROCEDURE — 93798 PHYS/QHP OP CAR RHAB W/ECG: CPT

## 2020-01-15 ENCOUNTER — HOSPITAL ENCOUNTER (OUTPATIENT)
Dept: CARDIAC REHAB | Age: 80
Setting detail: THERAPIES SERIES
Discharge: HOME | End: 2020-01-15
Payer: MEDICARE

## 2020-01-15 VITALS — BODY MASS INDEX: 29.5 KG/M2 | WEIGHT: 194 LBS

## 2020-01-15 PROCEDURE — 93798 PHYS/QHP OP CAR RHAB W/ECG: CPT

## 2020-01-17 ENCOUNTER — HOSPITAL ENCOUNTER (OUTPATIENT)
Dept: CARDIAC REHAB | Age: 80
Setting detail: THERAPIES SERIES
Discharge: HOME | End: 2020-01-17
Payer: MEDICARE

## 2020-01-17 VITALS — WEIGHT: 195 LBS | BODY MASS INDEX: 29.65 KG/M2

## 2020-01-17 PROCEDURE — 93798 PHYS/QHP OP CAR RHAB W/ECG: CPT

## 2020-01-20 ENCOUNTER — HOSPITAL ENCOUNTER (OUTPATIENT)
Dept: CARDIAC REHAB | Age: 80
Setting detail: THERAPIES SERIES
Discharge: HOME | End: 2020-01-20
Payer: MEDICARE

## 2020-01-20 VITALS — WEIGHT: 194 LBS | BODY MASS INDEX: 29.5 KG/M2

## 2020-01-20 PROCEDURE — 93798 PHYS/QHP OP CAR RHAB W/ECG: CPT

## 2020-01-22 ENCOUNTER — HOSPITAL ENCOUNTER (OUTPATIENT)
Dept: CARDIAC REHAB | Age: 80
Setting detail: THERAPIES SERIES
Discharge: HOME | End: 2020-01-22
Payer: MEDICARE

## 2020-01-24 ENCOUNTER — HOSPITAL ENCOUNTER (OUTPATIENT)
Dept: CARDIAC REHAB | Age: 80
Setting detail: THERAPIES SERIES
Discharge: HOME | End: 2020-01-24
Payer: MEDICARE

## 2020-01-24 VITALS — WEIGHT: 196 LBS | BODY MASS INDEX: 29.8 KG/M2

## 2020-01-24 PROCEDURE — 93798 PHYS/QHP OP CAR RHAB W/ECG: CPT

## 2020-01-27 ENCOUNTER — HOSPITAL ENCOUNTER (OUTPATIENT)
Dept: CARDIAC REHAB | Age: 80
Setting detail: THERAPIES SERIES
Discharge: HOME | End: 2020-01-27
Payer: MEDICARE

## 2020-01-29 ENCOUNTER — HOSPITAL ENCOUNTER (OUTPATIENT)
Dept: CARDIAC REHAB | Age: 80
Setting detail: THERAPIES SERIES
Discharge: HOME | End: 2020-01-29
Payer: MEDICARE

## 2020-01-31 ENCOUNTER — HOSPITAL ENCOUNTER (OUTPATIENT)
Dept: CARDIAC REHAB | Age: 80
Setting detail: THERAPIES SERIES
Discharge: HOME | End: 2020-01-31
Payer: MEDICARE

## 2020-02-03 ENCOUNTER — HOSPITAL ENCOUNTER (OUTPATIENT)
Dept: CARDIAC REHAB | Age: 80
Setting detail: THERAPIES SERIES
Discharge: HOME | End: 2020-02-03
Payer: MEDICARE

## 2020-02-05 ENCOUNTER — HOSPITAL ENCOUNTER (OUTPATIENT)
Dept: CARDIAC REHAB | Age: 80
Setting detail: THERAPIES SERIES
Discharge: HOME | End: 2020-02-05
Payer: MEDICARE

## 2020-02-10 ENCOUNTER — HOSPITAL ENCOUNTER (OUTPATIENT)
Dept: CARDIAC REHAB | Age: 80
Setting detail: THERAPIES SERIES
Discharge: HOME | End: 2020-02-10
Payer: MEDICARE

## 2020-02-12 ENCOUNTER — HOSPITAL ENCOUNTER (OUTPATIENT)
Dept: CARDIAC REHAB | Age: 80
Setting detail: THERAPIES SERIES
Discharge: HOME | End: 2020-02-12
Payer: MEDICARE

## 2020-02-14 ENCOUNTER — HOSPITAL ENCOUNTER (OUTPATIENT)
Dept: CARDIAC REHAB | Age: 80
Setting detail: THERAPIES SERIES
Discharge: HOME | End: 2020-02-14
Payer: MEDICARE

## 2020-02-14 VITALS — WEIGHT: 191.6 LBS | BODY MASS INDEX: 29.04 KG/M2 | HEIGHT: 68 IN

## 2020-02-14 PROCEDURE — 93798 PHYS/QHP OP CAR RHAB W/ECG: CPT

## 2020-11-03 PROBLEM — I25.10 CAD IN NATIVE ARTERY: Status: RESOLVED | Noted: 2019-10-04 | Resolved: 2020-11-03

## 2021-03-18 ENCOUNTER — HOSPITAL ENCOUNTER (OUTPATIENT)
Age: 81
Discharge: HOME | End: 2021-03-18
Payer: MEDICARE

## 2021-03-18 DIAGNOSIS — I10 ESSENTIAL HYPERTENSION: ICD-10-CM

## 2021-03-18 DIAGNOSIS — Z12.5 SCREENING FOR PROSTATE CANCER: ICD-10-CM

## 2021-03-18 DIAGNOSIS — E11.8 TYPE 2 DIABETES MELLITUS WITH COMPLICATION, WITHOUT LONG-TERM CURRENT USE OF INSULIN (HCC): ICD-10-CM

## 2021-03-18 DIAGNOSIS — E78.2 MIXED HYPERLIPIDEMIA: ICD-10-CM

## 2021-03-18 LAB
ABSOLUTE EOS #: 0.4 K/UL (ref 0–0.4)
ABSOLUTE IMMATURE GRANULOCYTE: ABNORMAL K/UL (ref 0–0.3)
ABSOLUTE LYMPH #: 1.3 K/UL (ref 1–4.8)
ABSOLUTE MONO #: 0.5 K/UL (ref 0.1–1.3)
ALBUMIN SERPL-MCNC: 4.1 G/DL (ref 3.5–5.2)
ALBUMIN/GLOBULIN RATIO: ABNORMAL (ref 1–2.5)
ALP BLD-CCNC: 73 U/L (ref 40–129)
ALT SERPL-CCNC: 25 U/L (ref 5–41)
ANION GAP SERPL CALCULATED.3IONS-SCNC: 9 MMOL/L (ref 9–17)
AST SERPL-CCNC: 20 U/L
BASOPHILS # BLD: 0 % (ref 0–2)
BASOPHILS ABSOLUTE: 0 K/UL (ref 0–0.2)
BILIRUB SERPL-MCNC: 0.66 MG/DL (ref 0.3–1.2)
BUN BLDV-MCNC: 20 MG/DL (ref 8–23)
BUN/CREAT BLD: ABNORMAL (ref 9–20)
CALCIUM SERPL-MCNC: 9.1 MG/DL (ref 8.6–10.4)
CHLORIDE BLD-SCNC: 105 MMOL/L (ref 98–107)
CHOLESTEROL, FASTING: 176 MG/DL
CHOLESTEROL/HDL RATIO: 3.5
CO2: 25 MMOL/L (ref 20–31)
CREAT SERPL-MCNC: 1.27 MG/DL (ref 0.7–1.2)
DIFFERENTIAL TYPE: ABNORMAL
EOSINOPHILS RELATIVE PERCENT: 6 % (ref 0–4)
ESTIMATED AVERAGE GLUCOSE: 180 MG/DL
GFR AFRICAN AMERICAN: >60 ML/MIN
GFR NON-AFRICAN AMERICAN: 55 ML/MIN
GFR SERPL CREATININE-BSD FRML MDRD: ABNORMAL ML/MIN/{1.73_M2}
GFR SERPL CREATININE-BSD FRML MDRD: ABNORMAL ML/MIN/{1.73_M2}
GLUCOSE BLD-MCNC: 205 MG/DL (ref 70–99)
HBA1C MFR BLD: 7.9 % (ref 4–6)
HCT VFR BLD CALC: 40.5 % (ref 41–53)
HDLC SERPL-MCNC: 50 MG/DL
HEMOGLOBIN: 13.8 G/DL (ref 13.5–17.5)
IMMATURE GRANULOCYTES: ABNORMAL %
LDL CHOLESTEROL: 95 MG/DL (ref 0–130)
LYMPHOCYTES # BLD: 22 % (ref 24–44)
MCH RBC QN AUTO: 31.4 PG (ref 26–34)
MCHC RBC AUTO-ENTMCNC: 34.1 G/DL (ref 31–37)
MCV RBC AUTO: 92 FL (ref 80–100)
MONOCYTES # BLD: 8 % (ref 1–7)
NRBC AUTOMATED: ABNORMAL PER 100 WBC
PDW BLD-RTO: 14.2 % (ref 11.5–14.9)
PLATELET # BLD: 197 K/UL (ref 150–450)
PLATELET ESTIMATE: ABNORMAL
PMV BLD AUTO: 7.8 FL (ref 6–12)
POTASSIUM SERPL-SCNC: 4.7 MMOL/L (ref 3.7–5.3)
PROSTATE SPECIFIC ANTIGEN: 0.68 UG/L
RBC # BLD: 4.41 M/UL (ref 4.5–5.9)
RBC # BLD: ABNORMAL 10*6/UL
SEG NEUTROPHILS: 64 % (ref 36–66)
SEGMENTED NEUTROPHILS ABSOLUTE COUNT: 3.9 K/UL (ref 1.3–9.1)
SODIUM BLD-SCNC: 139 MMOL/L (ref 135–144)
TOTAL PROTEIN: 7.2 G/DL (ref 6.4–8.3)
TRIGLYCERIDE, FASTING: 153 MG/DL
VLDLC SERPL CALC-MCNC: ABNORMAL MG/DL (ref 1–30)
WBC # BLD: 6.1 K/UL (ref 3.5–11)
WBC # BLD: ABNORMAL 10*3/UL

## 2021-03-18 PROCEDURE — 80061 LIPID PANEL: CPT

## 2021-03-18 PROCEDURE — 36415 COLL VENOUS BLD VENIPUNCTURE: CPT

## 2021-03-18 PROCEDURE — 83036 HEMOGLOBIN GLYCOSYLATED A1C: CPT

## 2021-03-18 PROCEDURE — 80053 COMPREHEN METABOLIC PANEL: CPT

## 2021-03-18 PROCEDURE — G0103 PSA SCREENING: HCPCS

## 2021-03-18 PROCEDURE — 85025 COMPLETE CBC W/AUTO DIFF WBC: CPT

## 2021-09-20 PROBLEM — E56.9 VITAMIN DEFICIENCY: Status: ACTIVE | Noted: 2021-09-20

## 2021-09-20 PROBLEM — I49.3 PVC (PREMATURE VENTRICULAR CONTRACTION): Status: ACTIVE | Noted: 2021-09-20

## 2021-09-20 PROBLEM — E06.3 AUTOIMMUNE THYROIDITIS: Status: ACTIVE | Noted: 2021-09-20

## 2021-09-20 PROBLEM — R06.02 SOB (SHORTNESS OF BREATH): Status: ACTIVE | Noted: 2021-09-20

## 2021-09-20 PROBLEM — E11.8 DISORDER ASSOCIATED WITH TYPE 2 DIABETES MELLITUS (HCC): Status: ACTIVE | Noted: 2021-09-20

## 2021-09-20 PROBLEM — Z95.1 PRESENCE OF AORTOCORONARY BYPASS GRAFT: Status: ACTIVE | Noted: 2021-09-20

## 2021-09-20 PROBLEM — I49.3 VENTRICULAR PREMATURE DEPOLARIZATION: Status: ACTIVE | Noted: 2021-09-20

## 2021-09-20 PROBLEM — R39.11 HESITANCY OF MICTURITION: Status: ACTIVE | Noted: 2021-09-20

## 2021-10-02 ENCOUNTER — HOSPITAL ENCOUNTER (EMERGENCY)
Age: 81
Discharge: HOME | End: 2021-10-02
Attending: EMERGENCY MEDICINE
Payer: MEDICARE

## 2021-10-02 ENCOUNTER — APPOINTMENT (OUTPATIENT)
Dept: GENERAL RADIOLOGY | Age: 81
End: 2021-10-02
Payer: MEDICARE

## 2021-10-02 VITALS
OXYGEN SATURATION: 97 % | SYSTOLIC BLOOD PRESSURE: 137 MMHG | DIASTOLIC BLOOD PRESSURE: 63 MMHG | HEART RATE: 108 BPM | WEIGHT: 193 LBS | BODY MASS INDEX: 27.63 KG/M2 | HEIGHT: 70 IN | TEMPERATURE: 98.3 F | RESPIRATION RATE: 18 BRPM

## 2021-10-02 DIAGNOSIS — U07.1 COVID-19: Primary | ICD-10-CM

## 2021-10-02 LAB
CHP ED QC CHECK: YES
GLUCOSE BLD-MCNC: 135 MG/DL
GLUCOSE BLD-MCNC: 135 MG/DL (ref 75–110)
SARS-COV-2, RAPID: DETECTED
SPECIMEN DESCRIPTION: ABNORMAL

## 2021-10-02 PROCEDURE — 99284 EMERGENCY DEPT VISIT MOD MDM: CPT

## 2021-10-02 PROCEDURE — 82947 ASSAY GLUCOSE BLOOD QUANT: CPT

## 2021-10-02 PROCEDURE — 71045 X-RAY EXAM CHEST 1 VIEW: CPT

## 2021-10-02 PROCEDURE — 6370000000 HC RX 637 (ALT 250 FOR IP): Performed by: EMERGENCY MEDICINE

## 2021-10-02 PROCEDURE — 87635 SARS-COV-2 COVID-19 AMP PRB: CPT

## 2021-10-02 RX ORDER — ACETAMINOPHEN 500 MG
1000 TABLET ORAL EVERY 6 HOURS PRN
Qty: 60 TABLET | Refills: 0 | Status: SHIPPED | OUTPATIENT
Start: 2021-10-02 | End: 2021-11-17

## 2021-10-02 RX ORDER — EPINEPHRINE 1 MG/ML
0.3 INJECTION, SOLUTION, CONCENTRATE INTRAVENOUS PRN
Status: CANCELLED | OUTPATIENT
Start: 2021-10-02

## 2021-10-02 RX ORDER — BENZONATATE 100 MG/1
100 CAPSULE ORAL 3 TIMES DAILY PRN
Qty: 30 CAPSULE | Refills: 0 | Status: SHIPPED | OUTPATIENT
Start: 2021-10-02 | End: 2021-10-09

## 2021-10-02 RX ORDER — METHYLPREDNISOLONE SODIUM SUCCINATE 125 MG/2ML
125 INJECTION, POWDER, LYOPHILIZED, FOR SOLUTION INTRAMUSCULAR; INTRAVENOUS
Status: CANCELLED | OUTPATIENT
Start: 2021-10-02 | End: 2021-10-02

## 2021-10-02 RX ORDER — DIPHENHYDRAMINE HYDROCHLORIDE 50 MG/ML
50 INJECTION INTRAMUSCULAR; INTRAVENOUS
Status: CANCELLED | OUTPATIENT
Start: 2021-10-02 | End: 2021-10-02

## 2021-10-02 RX ORDER — BENZONATATE 100 MG/1
100 CAPSULE ORAL ONCE
Status: COMPLETED | OUTPATIENT
Start: 2021-10-02 | End: 2021-10-02

## 2021-10-02 RX ORDER — SODIUM CHLORIDE 9 MG/ML
100 INJECTION, SOLUTION INTRAVENOUS CONTINUOUS PRN
Status: CANCELLED | OUTPATIENT
Start: 2021-10-02

## 2021-10-02 RX ORDER — SODIUM CHLORIDE 9 MG/ML
INJECTION, SOLUTION INTRAVENOUS CONTINUOUS PRN
Status: CANCELLED | OUTPATIENT
Start: 2021-10-02 | End: 2021-10-03

## 2021-10-02 RX ADMIN — BENZONATATE 100 MG: 100 CAPSULE ORAL at 10:20

## 2021-10-02 ASSESSMENT — ENCOUNTER SYMPTOMS
COUGH: 1
ABDOMINAL PAIN: 0
WHEEZING: 0
NAUSEA: 0
SINUS CONGESTION: 1
VOMITING: 0
SHORTNESS OF BREATH: 0

## 2021-10-02 NOTE — ED PROVIDER NOTES
EMERGENCY DEPARTMENT ENCOUNTER    Pt Name: Gallo Cline  MRN: [id number]  [place] 1940  Date of evaluation: 10/2/21  CHIEF COMPLAINT       Chief Complaint   Patient presents with    Cough    Generalized Body Aches    Nasal Congestion     HISTORY OF PRESENT ILLNESS     Cough  Cough characteristics:  Non-productive  Severity:  Moderate  Onset quality:  Gradual  Duration:  2 days  Timing:  Constant  Progression:  Unchanged  Chronicity:  New  Smoker: no    Relieved by:  Nothing  Worsened by:  Nothing  Ineffective treatments:  None tried  Associated symptoms: sinus congestion    Associated symptoms: no chest pain, no chills, no fever, no shortness of breath and no wheezing            REVIEW OF SYSTEMS     Review of Systems   Constitutional: Negative for chills and fever. Respiratory: Positive for cough. Negative for shortness of breath and wheezing. Cardiovascular: Negative for chest pain and leg swelling. Gastrointestinal: Negative for abdominal pain, nausea and vomiting. All other systems reviewed and are negative. PASTMEDICAL HISTORY     Past Medical History:   Diagnosis Date    CAD (coronary artery disease)     Diabetes mellitus ([address]     Hyperlipidemia     Hypertension     PVC (premature ventricular contraction)     SOB (shortness of breath)      Past Problem List  Patient Active Problem List   Diagnosis Code    Diverticulosis of colon K57.30    Essential hypertension I10    Type 2 diabetes mellitus ([address] E11.9    Hyperlipidemia E78.5    Mixed hyperlipidemia E78.2    Type 2 diabetes mellitus without complication, with long-term current use of insulin ([address] E11.9, Z79.4    Coronary artery disease involving native coronary artery of native heart without angina pectoris I25.10    Primary osteoarthritis involving multiple joints M89.49    Overweight (BMI 25.0-29. 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    SOB (shortness of breath) R06.02    Ventricular premature depolarization I49.3    Vitamin deficiency E56.9     SURGICAL HISTORY       Past Surgical History:   Procedure Laterality Date    CARDIAC SURGERY      COLONOSCOPY  10/5/2011    normal    COLONOSCOPY  3/7/2006    small internal hemorrhoids, diverticulosis    COLONOSCOPY  2001    small internal hemorrhoids, hyperplastic polyp     CURRENT MEDICATIONS       Previous Medications    ASPIRIN 81 MG CHEWABLE TABLET    Take 81 mg by mouth daily    INSULIN NPH ISOPHANE & REGULAR (HUMULIN;NOVOLIN) (70-30) 100 UNIT/ML INJECTION PEN    Inject 30 Units into the skin three times daily SLIDING SCALE    LEVOTHYROXINE (SYNTHROID) 88 MCG TABLET    Take 1 tablet by mouth daily 1 tablet daily    LOSARTAN (COZAAR) 100 MG TABLET    Take 100 mg by mouth daily    MAGNESIUM OXIDE (MAG-OX) 400 MG TABLET    Take 400 mg by mouth daily    METFORMIN (GLUCOPHAGE) 500 MG TABLET    Take 1,000 mg by mouth 2 times daily (with meals)    MULTIPLE VITAMINS-MINERALS (THERAPEUTIC MULTIVITAMIN-MINERALS) TABLET    Take 1 tablet by mouth daily    PRAVASTATIN (PRAVACHOL) 80 MG TABLET    Take 80 mg by mouth daily    VITAMIN D (CHOLECALCIFEROL) 1000 UNIT TABS TABLET    Take 1,000 Units by mouth daily     ALLERGIES     has No Known Allergies. FAMILY HISTORY     has no family status information on file. SOCIAL HISTORY       Social History     Tobacco Use    Smoking status: Former Smoker     Packs/day: 0.50     Years: 9.00     Pack years: 4.50     Types: Pipe     Start date: 1974     Quit date: 1983     Years since quittin.0    Smokeless tobacco: Never Used   Substance Use Topics    Alcohol use:  Yes    Drug use: No     PHYSICAL EXAM     INITIAL VITALS: /63   Pulse 108   Temp 98.3 °F (36.8 °C) (Oral)   Resp 18   Ht 5' 10\" (1.778 m)   Wt 193 lb (87.5 kg)   SpO2 97%   BMI 27.69 kg/m²    Physical Exam  Constitutional:       General: He is not in acute distress. Appearance: Normal appearance. He is well-developed. He is not diaphoretic. HENT:      Head: Normocephalic and atraumatic. Right Ear: External ear normal.      Left Ear: External ear normal.      Nose: Nose normal. No congestion. Mouth/Throat:      Mouth: Mucous membranes are moist.      Pharynx: Oropharynx is clear. Eyes:      General:         Right eye: No discharge. Left eye: No discharge. Conjunctiva/sclera: Conjunctivae normal.      Pupils: Pupils are equal, round, and reactive to light. Neck:      Trachea: No tracheal deviation. Cardiovascular:      Rate and Rhythm: Normal rate and regular rhythm. Pulses: Normal pulses. Heart sounds: Normal heart sounds. Pulmonary:      Effort: Pulmonary effort is normal. No respiratory distress. Breath sounds: Normal breath sounds. No stridor. No wheezing or rales. Abdominal:      Palpations: Abdomen is soft. Tenderness: There is no abdominal tenderness. There is no guarding or rebound. Musculoskeletal:         General: No tenderness or deformity. Normal range of motion. Cervical back: Normal range of motion and neck supple. Skin:     General: Skin is warm and dry. Capillary Refill: Capillary refill takes less than 2 seconds. Findings: No erythema or rash. Neurological:      General: No focal deficit present. Mental Status: He is alert and oriented to person, place, and time. Cranial Nerves: No cranial nerve deficit. Coordination: Coordination normal.   Psychiatric:         Mood and Affect: Mood normal.         Behavior: Behavior normal.         Thought Content:  Thought content normal.         Judgment: Judgment normal.         MEDICAL DECISION MAKING:   He and wife both test positive  They agree to monoclonal antibody  Contacted pharmacy, they are setting up outpatient infusion  Patient doesn't need to be hospitalized at this time  Discussed with patient anticipatory guidance, discharge instructions, follow up PCP 24 hours  rx tylenol and tessalon  Do not suspect a PE    The FDA EUAs for casirivimab-imdevimab are for non-hospitalized COVID-19 patients with mild to moderate illness (eg, not requiring supplemental oxygen or, if on chronic supplemental oxygen, without an increased oxygen requirement) who have certain risk factors for severe disease. These risk factors for adults (?18 years) include any of the following:  Body mass index (BMI) ? 35 kg/m²  Chronic kidney disease  Diabetes mellitus  Immunosuppression (immunosuppressive disease or treatment)  ?72years of age  ?54 years of age and who have cardiovascular disease, and/or hypertension, and/or chronic obstructive pulmonary disease (or other chronic respiratory disease)  UpToDate         Procedures    DIAGNOSTIC RESULTS     RADIOLOGY:All plain film, CT, MRI, and formal ultrasound images (except ED bedside ultrasound) are read by the radiologist, see reports below, unless otherwisenoted in MDM or here. XR CHEST PORTABLE   Final Result   No acute cardiopulmonary disease. LABS: All lab results were reviewed by myself, and all abnormals are listed below.   Labs Reviewed   COVID-19, RAPID - Abnormal; Notable for the following components:       Result Value    SARS-CoV-2, Rapid DETECTED (*)     All other components within normal limits   POC GLUCOSE FINGERSTICK - Abnormal; Notable for the following components:    POC Glucose 135 (*)     All other components within normal limits   POCT GLUCOSE - Normal       EMERGENCY DEPARTMENTCOURSE:         Vitals:    Vitals:    10/02/21 0936   BP: 137/63   Pulse: 108   Resp: 18   Temp: 98.3 °F (36.8 °C)   TempSrc: Oral   SpO2: 97%   Weight: 193 lb (87.5 kg)   Height: 5' 10\" (1.778 m)       The patient was given the following medications while in the emergency department:  Orders Placed This Encounter   Medications    benzonatate (TESSALON) capsule 100 mg    acetaminophen (TYLENOL) 500 MG tablet     Sig: Take 2 tablets by mouth every 6 hours as needed for Pain     Dispense:  60 tablet     Refill:  0    benzonatate (TESSALON) 100 MG capsule     Sig: Take 1 capsule by mouth 3 times daily as needed for Cough     Dispense:  30 capsule     Refill:  0       FINAL IMPRESSION      1. COVID-19          DISPOSITION/PLAN   DISPOSITION Decision To Discharge 10/02/2021 11:43:24 AM      PATIENT REFERRED TO:  [facility]  [address] [place] [address]  [address]    Schedule an appointment as soon as possible for a visit in 2 days      DISCHARGE MEDICATIONS:  New Prescriptions    ACETAMINOPHEN (TYLENOL) 500 MG TABLET    Take 2 tablets by mouth every 6 hours as needed for Pain    BENZONATATE (TESSALON) 100 MG CAPSULE    Take 1 capsule by mouth 3 times daily as needed for Cough     Srinivas Santiago MD  Attending Emergency Physician                      Srinivas Santiago MD  10/02/21 4840

## 2021-10-04 ENCOUNTER — HOSPITAL ENCOUNTER (OUTPATIENT)
Dept: INFUSION THERAPY | Age: 81
Setting detail: INFUSION SERIES
Discharge: HOME | End: 2021-10-04
Payer: MEDICARE

## 2021-10-04 ENCOUNTER — CARE COORDINATION (OUTPATIENT)
Dept: CARE COORDINATION | Age: 81
End: 2021-10-04

## 2021-10-04 VITALS
HEART RATE: 90 BPM | RESPIRATION RATE: 18 BRPM | TEMPERATURE: 97.9 F | SYSTOLIC BLOOD PRESSURE: 116 MMHG | OXYGEN SATURATION: 94 % | DIASTOLIC BLOOD PRESSURE: 60 MMHG

## 2021-10-04 PROCEDURE — M0244 HC IV 1ST HOUR INIT DRUG: HCPCS

## 2021-10-04 PROCEDURE — 96365 THER/PROPH/DIAG IV INF INIT: CPT

## 2021-10-04 PROCEDURE — M0243 CASIRIVI AND IMDEVI INFUSION: HCPCS

## 2021-10-04 PROCEDURE — M0244 HC IV INFUSION CASIRIVIMAB AND IMDEVIMAB W/MONITORING: HCPCS

## 2021-10-04 PROCEDURE — 6360000002 HC RX W HCPCS: Performed by: EMERGENCY MEDICINE

## 2021-10-04 PROCEDURE — 2580000003 HC RX 258: Performed by: EMERGENCY MEDICINE

## 2021-10-04 RX ORDER — SODIUM CHLORIDE 9 MG/ML
100 INJECTION, SOLUTION INTRAVENOUS CONTINUOUS
Status: DISCONTINUED | OUTPATIENT
Start: 2021-10-04 | End: 2021-10-05 | Stop reason: HOSPADM

## 2021-10-04 RX ORDER — METHYLPREDNISOLONE SODIUM SUCCINATE 125 MG/2ML
125 INJECTION, POWDER, LYOPHILIZED, FOR SOLUTION INTRAMUSCULAR; INTRAVENOUS
Status: ACTIVE | OUTPATIENT
Start: 2021-10-04 | End: 2021-10-04

## 2021-10-04 RX ORDER — SODIUM CHLORIDE 9 MG/ML
INJECTION, SOLUTION INTRAVENOUS CONTINUOUS PRN
Status: DISCONTINUED | OUTPATIENT
Start: 2021-10-04 | End: 2021-10-04

## 2021-10-04 RX ORDER — DIPHENHYDRAMINE HYDROCHLORIDE 50 MG/ML
50 INJECTION INTRAMUSCULAR; INTRAVENOUS
Status: ACTIVE | OUTPATIENT
Start: 2021-10-04 | End: 2021-10-04

## 2021-10-04 RX ORDER — EPINEPHRINE 1 MG/ML
0.3 INJECTION, SOLUTION, CONCENTRATE INTRAVENOUS PRN
Status: DISCONTINUED | OUTPATIENT
Start: 2021-10-04 | End: 2021-10-05 | Stop reason: HOSPADM

## 2021-10-04 RX ADMIN — CASIRIVIMAB AND IMDEVIMAB: 600; 600 INJECTION, SOLUTION, CONCENTRATE INTRAVENOUS at 08:29

## 2021-10-04 RX ADMIN — SODIUM CHLORIDE 100 ML/HR: 9 INJECTION, SOLUTION INTRAVENOUS at 08:34

## 2021-10-04 NOTE — CARE COORDINATION
1st attempt to reach patient as a follow up to ED visit. Numbers available in chart are not accepting calls at this time.    Linda French RN, ambulatory care manager

## 2021-10-04 NOTE — PROGRESS NOTES
Reviewed FDA EUA Fact sheet and After Infusion Information sheet for discharge. Written copies provided to patient. Verbalized understanding. Encouraged PO fluids and rest. DC home with spouse. CASIRIVIMAB and IMDEVIMAB     You received an infusion of CASIRIVIMAB and IMDEVIMAB authorized for emergency use by the FDA. You should continue to self-isolate and use infection control measures (wear mask, isolate, social distance, avoid sharing personal items, clean and disinfect \"high touch\" surfaces, and frequent handwashing) according to CDC guidelines. Report all changes in your health to your doctors as soon as possible. Symptoms to report to your physician immediately or seek emergency medical care:   ·  Fever, Chills, Shakes, Hives, Rash, Itching, Swelling of lips, mouth or throat   ·  Shortness of breath, difficulty breathing or wheezing, Chest pain   ·  Cough, Sneezing   ·  Fatigue, muscle or joint pain/aching,   ·  Headache   ·  Weakness, numbness, tingling is any part of your body   ·  Low blood pressure/Oxygen saturation levels  ·  Dizziness, feeling faint   ·  Nausea        These are not all of the possible side effects. Serious and unexpected side effects may happen.

## 2021-10-05 ENCOUNTER — CARE COORDINATION (OUTPATIENT)
Dept: CARE COORDINATION | Age: 81
End: 2021-10-05

## 2021-10-05 NOTE — CARE COORDINATION
2nd attempt to reach patient as a follow up to ED visit. Numbers available in chart are not accepting calls at this time.    Gigi Daniel RN, ambulatory care manager

## 2021-11-17 ENCOUNTER — APPOINTMENT (OUTPATIENT)
Dept: GENERAL RADIOLOGY | Age: 81
End: 2021-11-17
Payer: MEDICARE

## 2021-11-17 ENCOUNTER — HOSPITAL ENCOUNTER (EMERGENCY)
Age: 81
Discharge: HOME | End: 2021-11-17
Attending: EMERGENCY MEDICINE
Payer: MEDICARE

## 2021-11-17 VITALS
OXYGEN SATURATION: 97 % | TEMPERATURE: 97.6 F | WEIGHT: 142 LBS | RESPIRATION RATE: 18 BRPM | SYSTOLIC BLOOD PRESSURE: 130 MMHG | HEIGHT: 70 IN | HEART RATE: 77 BPM | BODY MASS INDEX: 20.33 KG/M2 | DIASTOLIC BLOOD PRESSURE: 112 MMHG

## 2021-11-17 DIAGNOSIS — R05.9 COUGH: Primary | ICD-10-CM

## 2021-11-17 DIAGNOSIS — J18.9 PNEUMONIA OF RIGHT LOWER LOBE DUE TO INFECTIOUS ORGANISM: ICD-10-CM

## 2021-11-17 LAB
INFLUENZA A: NOT DETECTED
INFLUENZA B: NOT DETECTED
SARS-COV-2 RNA, RT PCR: NOT DETECTED
SOURCE: NORMAL
SPECIMEN DESCRIPTION: NORMAL

## 2021-11-17 PROCEDURE — 6370000000 HC RX 637 (ALT 250 FOR IP): Performed by: EMERGENCY MEDICINE

## 2021-11-17 PROCEDURE — 99285 EMERGENCY DEPT VISIT HI MDM: CPT

## 2021-11-17 PROCEDURE — 71045 X-RAY EXAM CHEST 1 VIEW: CPT

## 2021-11-17 PROCEDURE — 87636 SARSCOV2 & INF A&B AMP PRB: CPT

## 2021-11-17 RX ORDER — AMOXICILLIN 250 MG/1
1000 CAPSULE ORAL ONCE
Status: COMPLETED | OUTPATIENT
Start: 2021-11-17 | End: 2021-11-17

## 2021-11-17 RX ORDER — DOXYCYCLINE 100 MG/1
100 CAPSULE ORAL ONCE
Status: COMPLETED | OUTPATIENT
Start: 2021-11-17 | End: 2021-11-17

## 2021-11-17 RX ORDER — BENZONATATE 100 MG/1
100 CAPSULE ORAL 3 TIMES DAILY PRN
Qty: 30 CAPSULE | Refills: 0 | Status: SHIPPED | OUTPATIENT
Start: 2021-11-17 | End: 2021-11-24

## 2021-11-17 RX ORDER — DOXYCYCLINE HYCLATE 100 MG
100 TABLET ORAL 2 TIMES DAILY
Qty: 10 TABLET | Refills: 0 | Status: SHIPPED | OUTPATIENT
Start: 2021-11-17 | End: 2021-11-22

## 2021-11-17 RX ORDER — ACETAMINOPHEN 500 MG
1000 TABLET ORAL EVERY 6 HOURS PRN
Qty: 60 TABLET | Refills: 0 | Status: SHIPPED | OUTPATIENT
Start: 2021-11-17

## 2021-11-17 RX ORDER — AMOXICILLIN 500 MG/1
1000 CAPSULE ORAL 3 TIMES DAILY
Qty: 30 CAPSULE | Refills: 0 | Status: SHIPPED | OUTPATIENT
Start: 2021-11-17 | End: 2021-11-22

## 2021-11-17 RX ADMIN — AMOXICILLIN 1000 MG: 250 CAPSULE ORAL at 13:45

## 2021-11-17 RX ADMIN — DOXYCYCLINE 100 MG: 100 CAPSULE ORAL at 13:45

## 2021-11-17 ASSESSMENT — ENCOUNTER SYMPTOMS
COUGH: 1
SHORTNESS OF BREATH: 0

## 2021-11-17 NOTE — ED PROVIDER NOTES
EMERGENCY DEPARTMENT ENCOUNTER    Pt Name: Dariel Garcia  MRN: [id number]  [place] 1940  Date of evaluation: 11/17/21  CHIEF COMPLAINT       Chief Complaint   Patient presents with    Cough     HISTORY OF PRESENT ILLNESS     Cough  Cough characteristics:  Productive  Severity:  Moderate  Onset quality:  Gradual  Duration:  4 days  Timing:  Constant  Progression:  Unchanged  Chronicity:  New  Smoker: no    Context: sick contacts and upper respiratory infection    Context comment:  His wife has pneumonia, here also  Relieved by:  Nothing  Worsened by:  Nothing  Ineffective treatments:  None tried  Associated symptoms: no chest pain and no shortness of breath      Had covid 6 weeks ago, had regeneron  Vaccinated for covid  No travel  No leg pain  No cp  No fevers      REVIEW OF SYSTEMS     Review of Systems   Respiratory: Positive for cough. Negative for shortness of breath. Cardiovascular: Negative for chest pain. All other systems reviewed and are negative. PASTMEDICAL HISTORY     Past Medical History:   Diagnosis Date    CAD (coronary artery disease)     Diabetes mellitus ([address]     Hyperlipidemia     Hypertension     PVC (premature ventricular contraction)     SOB (shortness of breath)      Past Problem List  Patient Active Problem List   Diagnosis Code    Diverticulosis of colon K57.30    Essential hypertension I10    Type 2 diabetes mellitus ([address] E11.9    Hyperlipidemia E78.5    Mixed hyperlipidemia E78.2    Type 2 diabetes mellitus without complication, with long-term current use of insulin ([address] E11.9, Z79.4    Coronary artery disease involving native coronary artery of native heart without angina pectoris I25.10    Primary osteoarthritis involving multiple joints M89.49    Overweight (BMI 25.0-29. 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    SOB (shortness of breath) R06.02    Ventricular premature depolarization I49.3    Vitamin deficiency E56.9     SURGICAL HISTORY       Past Surgical History:   Procedure Laterality Date    CARDIAC SURGERY      COLONOSCOPY  10/5/2011    normal    COLONOSCOPY  3/7/2006    small internal hemorrhoids, diverticulosis    COLONOSCOPY  2001    small internal hemorrhoids, hyperplastic polyp     CURRENT MEDICATIONS       Discharge Medication List as of 2021  1:31 PM      CONTINUE these medications which have NOT CHANGED    Details   magnesium oxide (MAG-OX) 400 MG tablet Take 400 mg by mouth dailyHistorical Med      Multiple Vitamins-Minerals (THERAPEUTIC MULTIVITAMIN-MINERALS) tablet Take 1 tablet by mouth daily, Disp-30 tablet, R-5Normal      levothyroxine (SYNTHROID) 88 MCG tablet Take 1 tablet by mouth daily 1 tablet daily, Disp-30 tablet, R-5NO PRINT      vitamin D (CHOLECALCIFEROL) 1000 UNIT TABS tablet Take 1,000 Units by mouth daily      metFORMIN (GLUCOPHAGE) 500 MG tablet Take 1,000 mg by mouth 2 times daily (with meals)      losartan (COZAAR) 100 MG tablet Take 100 mg by mouth daily      pravastatin (PRAVACHOL) 80 MG tablet Take 80 mg by mouth daily      aspirin 81 MG chewable tablet Take 81 mg by mouth daily      Insulin NPH Isophane & Regular (HUMULIN;NOVOLIN) (70-30) 100 UNIT/ML injection pen Inject 30 Units into the skin three times daily SLIDING SCALEHistorical Med           ALLERGIES     has No Known Allergies. FAMILY HISTORY     has no family status information on file.       SOCIAL HISTORY       Social History     Tobacco Use    Smoking status: Former Smoker     Packs/day: 0.50     Years: 9.00     Pack years: 4.50     Types: Pipe     Start date: 1974     Quit date: 1983     Years since quittin.2    Smokeless tobacco: Never Used   Substance Use Topics    Alcohol use: Yes    Drug use: No     PHYSICAL EXAM     INITIAL VITALS: BP (!) 130/112   Pulse 77   Temp 97.6 °F (36.4 °C) (Oral)   Resp 18   Ht 5' 10\" (1.778 m)   Wt 142 lb (64.4 kg)   SpO2 97%   BMI 20.37 kg/m²    Physical Exam  Constitutional:       General: He is not in acute distress. Appearance: Normal appearance. He is well-developed. He is not diaphoretic. HENT:      Head: Normocephalic and atraumatic. Right Ear: External ear normal.      Left Ear: External ear normal.      Nose: Nose normal. No congestion. Mouth/Throat:      Mouth: Mucous membranes are moist.      Pharynx: Oropharynx is clear. Eyes:      General:         Right eye: No discharge. Left eye: No discharge. Conjunctiva/sclera: Conjunctivae normal.      Pupils: Pupils are equal, round, and reactive to light. Neck:      Trachea: No tracheal deviation. Cardiovascular:      Rate and Rhythm: Normal rate and regular rhythm. Pulses: Normal pulses. Heart sounds: Normal heart sounds. Pulmonary:      Effort: Pulmonary effort is normal. No respiratory distress. Breath sounds: Normal breath sounds. No stridor. No wheezing or rales. Abdominal:      Palpations: Abdomen is soft. Tenderness: There is no abdominal tenderness. There is no guarding or rebound. Musculoskeletal:         General: No tenderness or deformity. Normal range of motion. Cervical back: Normal range of motion and neck supple. Skin:     General: Skin is warm and dry. Capillary Refill: Capillary refill takes less than 2 seconds. Findings: No erythema or rash. Neurological:      General: No focal deficit present. Mental Status: He is alert and oriented to person, place, and time. Cranial Nerves: No cranial nerve deficit. Coordination: Coordination normal.   Psychiatric:         Mood and Affect: Mood normal.         Behavior: Behavior normal.         Thought Content:  Thought content normal. Judgment: Judgment normal.         MEDICAL DECISION MAKING:   His wife has suspected pneumonia  Both are covid neg  They are both here now  Do not suspect pe or ami or sepsis  Nontoxic well appearing  rx abx and cough med  Discussed with patient anticipatory guidance, discharge instructions, follow up PCP 24 hours           Procedures    DIAGNOSTIC RESULTS     RADIOLOGY:All plain film, CT, MRI, and formal ultrasound images (except ED bedside ultrasound) are read by the radiologist, see reports below, unless otherwisenoted in MDM or here. XR CHEST PORTABLE   Final Result   Small right pleural effusion. LABS: All lab results were reviewed by myself, and all abnormals are listed below. Labs Reviewed   COVID-19 & INFLUENZA COMBO       EMERGENCY DEPARTMENTCOURSE:         Vitals:    Vitals:    11/17/21 1116 11/17/21 1320   BP: (!) 130/112    Pulse: 105 77   Resp: 18    Temp: 97.6 °F (36.4 °C)    TempSrc: Oral    SpO2: 97%    Weight: 142 lb (64.4 kg)    Height: 5' 10\" (1.778 m)        The patient was given the following medications while in the emergency department:  Orders Placed This Encounter   Medications    acetaminophen (TYLENOL) 500 MG tablet     Sig: Take 2 tablets by mouth every 6 hours as needed for Pain     Dispense:  60 tablet     Refill:  0    benzonatate (TESSALON) 100 MG capsule     Sig: Take 1 capsule by mouth 3 times daily as needed for Cough     Dispense:  30 capsule     Refill:  0    amoxicillin (AMOXIL) 500 MG capsule     Sig: Take 2 capsules by mouth 3 times daily for 5 days     Dispense:  30 capsule     Refill:  0    doxycycline hyclate (VIBRA-TABS) 100 MG tablet     Sig: Take 1 tablet by mouth 2 times daily for 5 days     Dispense:  10 tablet     Refill:  0    amoxicillin (AMOXIL) capsule 1,000 mg    doxycycline monohydrate (MONODOX) capsule 100 mg       FINAL IMPRESSION      1. Cough    2.  Pneumonia of right lower lobe due to infectious organism          DISPOSITION/PLAN DISPOSITION Decision To Discharge 11/17/2021 01:26:56 PM      PATIENT REFERRED TO:  Amari Spencer, [address] [facility]  [address] [address] [phone]    Schedule an appointment as soon as possible for a visit in 1 day      DISCHARGE MEDICATIONS:  Discharge Medication List as of 11/17/2021  1:31 PM      START taking these medications    Details   benzonatate (TESSALON) 100 MG capsule Take 1 capsule by mouth 3 times daily as needed for Cough, Disp-30 capsule, R-0Normal      amoxicillin (AMOXIL) 500 MG capsule Take 2 capsules by mouth 3 times daily for 5 days, Disp-30 capsule, R-0Normal      doxycycline hyclate (VIBRA-TABS) 100 MG tablet Take 1 tablet by mouth 2 times daily for 5 days, Disp-10 tablet, R-0Normal           The care is provided during an unprecedented national emergency due to the novel coronavirus, COVID 19.   Maximiliano Stein MD  Attending Emergency Physician                    Maximiliano Stein MD  11/17/21 5544

## 2021-12-14 ENCOUNTER — HOSPITAL ENCOUNTER (OUTPATIENT)
Age: 81
Discharge: HOME | End: 2021-12-14
Payer: MEDICARE

## 2021-12-14 LAB
-: ABNORMAL
ABSOLUTE EOS #: 0.15 K/UL (ref 0–0.4)
ABSOLUTE IMMATURE GRANULOCYTE: ABNORMAL K/UL (ref 0–0.3)
ABSOLUTE LYMPH #: 1.13 K/UL (ref 1–4.8)
ABSOLUTE MONO #: 0.53 K/UL (ref 0.1–1.3)
ALBUMIN SERPL-MCNC: 4 G/DL (ref 3.5–5.2)
ALBUMIN/GLOBULIN RATIO: ABNORMAL (ref 1–2.5)
ALP BLD-CCNC: 78 U/L (ref 40–129)
ALT SERPL-CCNC: 27 U/L (ref 5–41)
AMORPHOUS: ABNORMAL
ANION GAP SERPL CALCULATED.3IONS-SCNC: 13 MMOL/L (ref 9–17)
AST SERPL-CCNC: 25 U/L
BACTERIA: ABNORMAL
BASOPHILS # BLD: 1 % (ref 0–2)
BASOPHILS ABSOLUTE: 0.08 K/UL (ref 0–0.2)
BILIRUB SERPL-MCNC: 0.67 MG/DL (ref 0.3–1.2)
BILIRUBIN URINE: NEGATIVE
BUN BLDV-MCNC: 15 MG/DL (ref 8–23)
BUN/CREAT BLD: ABNORMAL (ref 9–20)
CALCIUM SERPL-MCNC: 9.4 MG/DL (ref 8.6–10.4)
CASTS UA: ABNORMAL /LPF
CHLORIDE BLD-SCNC: 104 MMOL/L (ref 98–107)
CHOLESTEROL/HDL RATIO: 3.6
CHOLESTEROL: 226 MG/DL
CO2: 23 MMOL/L (ref 20–31)
COLOR: YELLOW
COMMENT UA: ABNORMAL
CREAT SERPL-MCNC: 1.03 MG/DL (ref 0.7–1.2)
CREATININE URINE: 183 MG/DL (ref 39–259)
CRYSTALS, UA: ABNORMAL /HPF
DIFFERENTIAL TYPE: ABNORMAL
EOSINOPHILS RELATIVE PERCENT: 2 % (ref 0–4)
EPITHELIAL CELLS UA: ABNORMAL /HPF
FOLATE: 11.4 NG/ML
GFR AFRICAN AMERICAN: >60 ML/MIN
GFR NON-AFRICAN AMERICAN: >60 ML/MIN
GFR SERPL CREATININE-BSD FRML MDRD: ABNORMAL ML/MIN/{1.73_M2}
GFR SERPL CREATININE-BSD FRML MDRD: ABNORMAL ML/MIN/{1.73_M2}
GLUCOSE BLD-MCNC: 114 MG/DL (ref 70–99)
GLUCOSE URINE: NEGATIVE
HCT VFR BLD CALC: 42.3 % (ref 41–53)
HDLC SERPL-MCNC: 63 MG/DL
HEMOGLOBIN: 14.1 G/DL (ref 13.5–17.5)
IMMATURE GRANULOCYTES: ABNORMAL %
KETONES, URINE: NEGATIVE
LDL CHOLESTEROL: 134 MG/DL (ref 0–130)
LEUKOCYTE ESTERASE, URINE: ABNORMAL
LYMPHOCYTES # BLD: 15 % (ref 24–44)
MCH RBC QN AUTO: 30.6 PG (ref 26–34)
MCHC RBC AUTO-ENTMCNC: 33.4 G/DL (ref 31–37)
MCV RBC AUTO: 91.8 FL (ref 80–100)
MICROALBUMIN/CREAT 24H UR: 12 MG/L
MICROALBUMIN/CREAT UR-RTO: 7 MCG/MG CREAT
MONOCYTES # BLD: 7 % (ref 1–7)
MORPHOLOGY: NORMAL
MUCUS: ABNORMAL
NITRITE, URINE: NEGATIVE
NRBC AUTOMATED: ABNORMAL PER 100 WBC
OTHER OBSERVATIONS UA: ABNORMAL
PDW BLD-RTO: 14.3 % (ref 11.5–14.9)
PH UA: 5.5 (ref 5–8)
PLATELET # BLD: 235 K/UL (ref 150–450)
PLATELET ESTIMATE: ABNORMAL
PMV BLD AUTO: 8.1 FL (ref 6–12)
POTASSIUM SERPL-SCNC: 4.7 MMOL/L (ref 3.7–5.3)
PROTEIN UA: NEGATIVE
RBC # BLD: 4.61 M/UL (ref 4.5–5.9)
RBC # BLD: ABNORMAL 10*6/UL
RBC UA: ABNORMAL /HPF
RENAL EPITHELIAL, UA: ABNORMAL /HPF
SEG NEUTROPHILS: 75 % (ref 36–66)
SEGMENTED NEUTROPHILS ABSOLUTE COUNT: 5.61 K/UL (ref 1.3–9.1)
SODIUM BLD-SCNC: 140 MMOL/L (ref 135–144)
SPECIFIC GRAVITY UA: 1.02 (ref 1–1.03)
THYROXINE, FREE: 1.22 NG/DL (ref 0.93–1.7)
TOTAL PROTEIN: 7.7 G/DL (ref 6.4–8.3)
TRICHOMONAS: ABNORMAL
TRIGL SERPL-MCNC: 144 MG/DL
TSH SERPL DL<=0.05 MIU/L-ACNC: 3.93 MIU/L (ref 0.3–5)
TURBIDITY: CLEAR
URINE HGB: NEGATIVE
UROBILINOGEN, URINE: NORMAL
VITAMIN B-12: 950 PG/ML (ref 232–1245)
VITAMIN D 25-HYDROXY: 53.9 NG/ML (ref 30–100)
VLDLC SERPL CALC-MCNC: ABNORMAL MG/DL (ref 1–30)
WBC # BLD: 7.5 K/UL (ref 3.5–11)
WBC # BLD: ABNORMAL 10*3/UL
WBC UA: ABNORMAL /HPF
YEAST: ABNORMAL

## 2021-12-14 PROCEDURE — 82746 ASSAY OF FOLIC ACID SERUM: CPT

## 2021-12-14 PROCEDURE — 82607 VITAMIN B-12: CPT

## 2021-12-14 PROCEDURE — 85025 COMPLETE CBC W/AUTO DIFF WBC: CPT

## 2021-12-14 PROCEDURE — 84443 ASSAY THYROID STIM HORMONE: CPT

## 2021-12-14 PROCEDURE — 82043 UR ALBUMIN QUANTITATIVE: CPT

## 2021-12-14 PROCEDURE — 80061 LIPID PANEL: CPT

## 2021-12-14 PROCEDURE — 36415 COLL VENOUS BLD VENIPUNCTURE: CPT

## 2021-12-14 PROCEDURE — 82306 VITAMIN D 25 HYDROXY: CPT

## 2021-12-14 PROCEDURE — 84439 ASSAY OF FREE THYROXINE: CPT

## 2021-12-14 PROCEDURE — 80053 COMPREHEN METABOLIC PANEL: CPT

## 2021-12-14 PROCEDURE — 81001 URINALYSIS AUTO W/SCOPE: CPT

## 2021-12-14 PROCEDURE — 82570 ASSAY OF URINE CREATININE: CPT

## 2022-04-04 ENCOUNTER — TELEPHONE (OUTPATIENT)
Dept: PHARMACY | Facility: CLINIC | Age: 82
End: 2022-04-04

## 2022-04-04 NOTE — LETTER
[place]  [address], [address]        Thea Ridley   [address] [facility]   [facility] [address]           04/07/22     Dear Thea Ridley,    We wanted to reach out to remind you that your statin therapy changed. You should no longer be taking pravastatin. Your new medication is rosuvastatin 20mg daily. Dr. Johan Bowie changed this medication at your appointment with him on January 10, 2022. It appears that this medication has not been filled at proper times. We are worried you might be missing doses or not taking it as directed. It is important that you take your medications regularly and try not to miss a single dose.     Some ways to help you remember to take and refill your medications are to:  · Use a pill box, set an alarm, and/or keep your medication near something that you do every day  · Ask your pharmacy if they participate in [facility]", a program where you can  all of your medications on the same day  · Ask your pharmacy if you can be set up with automatic refill, where they will automatically refill your prescription when it is due and let you know it's ready to     Sincerely,   Haylie Patrick, PharmD, BCACP, [facility]  Department, toll free: [phone], option 1

## 2022-04-04 NOTE — TELEPHONE ENCOUNTER
[facility]: ADHERENCE REVIEW  Identified care gap per [employer]: fills at [facility] and [facility]: Statin adherence    Last Visit: 09/21/2021 w/ PCP office for AWV, [facility] as well    Patient also appears to be prescribed: losartan, metformin, Humulin 70/30    ASSESSMENT  ACE/ARB ADHERENCE    Insurance Records claims through 03/22/2022 (Prior Year [place] = PASSED; DICKSON [place] = Filled only once; Potential Fail Date: 08/01/2022): Losartan 100mg daily last filled on 03/04/2022 for 90 day supply. Next refill due: 06/02/2022    Per [employer] Portal: same as above    BP Readings from Last 3 Encounters:   11/17/21 (!) 130/112   10/04/21 116/60   10/02/21 137/63     Estimated Creatinine Clearance: 52 mL/min (based on SCr of 1.03 mg/dL). DIABETES ADHERENCE    Insurance Records claims through 03/22/2022 (Prior Year [place] = PASSED; DICKSON [place] = 100%; Potential Fail Date: 09/13/2022): Metformin 1000mg BID last filled on 03/08/2022 for 90 day supply. Next refill due: 06/06/2022    Per [employer] Portal: same as above    Lab Results   Component Value Date    LABA1C 7.9 (H) 03/18/2021    LABA1C 8.1 09/17/2020    LABA1C 7.2 03/11/2019     NOTE A1c not yet completed this calendar year    [id number] Records claims through 03/22/2022 (Prior Year [place] = PASSED; YTD Branden [place] = Filled only once; Potential Fail Date: 04/21/2022):   Rosuvastatin 20mg daily last filled on 01/10/2022 for 30 day supply. Next refill due: 02/09/2022    Per [employer] Portal: same as above     Lab Results   Component Value Date    CHOL 226 (H) 12/14/2021    TRIG 144 12/14/2021    HDL 63 12/14/2021    LDLCHOLESTEROL 134 (H) 12/14/2021     ALT   Date Value Ref Range Status   12/14/2021 27 5 - 41 U/L Final     AST   Date Value Ref Range Status   12/14/2021 25 <40 U/L Final     The ASCVD Risk score (Gen Mas, et al., 2013) failed to calculate for the following reasons:     The 2013 ASCVD risk score is only valid for ages 36 to 78     PLAN  The following are interventions that have been identified:   - Patient overdue refilling rosuvastatin and active on home medication list.   - Patient eligible for 90 day supply of rosuvastatin    Per [facility], Dr. Janny Ch ([facility] cardio) changed patient's pravastatin to rosuvastatin 20mg daily on 01/10/2022 to increase statin intensity. Reached patient to review change in statin therapy and discuss adherence - spoke with wife and patient. Both wife, Mary Lucia, and patient seemed confused about what medication he should be taking. Patient states his medication did not change and he does not miss doses of his medication. Patient reports no side effects or issues with medication. Outreach to cardiology office to discuss sending 90ds of statin to [facility] as this is where patient fills maintenance medications and was filling pravastatin before change and recommend office provide education regarding having patient on rosuvastatin now. Nurse verified patient should be on rosuvastatin and will send 90ds to [facility]. Will follow and ensure pravastatin Rx gets canceled at [facility]. No future appointments.     Kirsten Russo, PharmD, BCACP, [facility]  Department, toll free: [phone], option 1

## 2022-04-06 NOTE — TELEPHONE ENCOUNTER
[facility] HEALTH CLINICAL PHARMACY REVIEW: ADHERENCE REVIEW    Outreach to [facility] and verified they received rosuvastatin Rx and will fill for patient. Also verified pravastatin Rx is no longer active at pharmacy. Will send letter to patient regarding statin change.       Nataliya Reyes, PharmD, [address] [facility], [address]   Department, toll free: [phone], option 1    =======================================================    For Pharmacy Admin Tracking Only   Recommendation Provided To: Provider: 1 via Called provider office and Pharmacy: 1   Intervention Detail: Refill(s) Provided   Gap Closed?: Yes    Intervention Accepted By: Provider: 1 and Pharmacy: 1   Time Spent (min): 45

## 2022-05-05 ENCOUNTER — APPOINTMENT (OUTPATIENT)
Dept: GENERAL RADIOLOGY | Age: 82
DRG: 303 | End: 2022-05-05
Payer: MEDICARE

## 2022-05-05 ENCOUNTER — HOSPITAL ENCOUNTER (INPATIENT)
Age: 82
LOS: 1 days | Discharge: ANOTHER HOSPITAL | DRG: 303 | End: 2022-05-06
Attending: STUDENT IN AN ORGANIZED HEALTH CARE EDUCATION/TRAINING PROGRAM
Payer: MEDICARE

## 2022-05-05 DIAGNOSIS — I20.0 UNSTABLE ANGINA (HCC): Primary | ICD-10-CM

## 2022-05-05 LAB
ABSOLUTE EOS #: 0.3 K/UL (ref 0–0.4)
ABSOLUTE LYMPH #: 1.9 K/UL (ref 1–4.8)
ABSOLUTE MONO #: 0.5 K/UL (ref 0.1–1.3)
ALBUMIN SERPL-MCNC: 3.9 G/DL (ref 3.5–5.2)
ALP BLD-CCNC: 65 U/L (ref 40–129)
ALT SERPL-CCNC: 19 U/L (ref 5–41)
ANION GAP SERPL CALCULATED.3IONS-SCNC: 11 MMOL/L (ref 9–17)
AST SERPL-CCNC: 22 U/L
BASOPHILS # BLD: 1 % (ref 0–2)
BASOPHILS ABSOLUTE: 0.1 K/UL (ref 0–0.2)
BILIRUB SERPL-MCNC: 0.58 MG/DL (ref 0.3–1.2)
BUN BLDV-MCNC: 19 MG/DL (ref 8–23)
CALCIUM SERPL-MCNC: 9.5 MG/DL (ref 8.6–10.4)
CHLORIDE BLD-SCNC: 103 MMOL/L (ref 98–107)
CO2: 24 MMOL/L (ref 20–31)
CREAT SERPL-MCNC: 1.31 MG/DL (ref 0.7–1.2)
EOSINOPHILS RELATIVE PERCENT: 4 % (ref 0–4)
GFR AFRICAN AMERICAN: >60 ML/MIN
GFR NON-AFRICAN AMERICAN: 53 ML/MIN
GFR SERPL CREATININE-BSD FRML MDRD: ABNORMAL ML/MIN/{1.73_M2}
GLUCOSE BLD-MCNC: 267 MG/DL (ref 70–99)
HCT VFR BLD CALC: 40.1 % (ref 41–53)
HEMOGLOBIN: 13.6 G/DL (ref 13.5–17.5)
LIPASE: 35 U/L (ref 13–60)
LYMPHOCYTES # BLD: 30 % (ref 24–44)
MCH RBC QN AUTO: 31.3 PG (ref 26–34)
MCHC RBC AUTO-ENTMCNC: 34 G/DL (ref 31–37)
MCV RBC AUTO: 92.1 FL (ref 80–100)
MONOCYTES # BLD: 8 % (ref 1–7)
PDW BLD-RTO: 13.9 % (ref 11.5–14.9)
PLATELET # BLD: 199 K/UL (ref 150–450)
PMV BLD AUTO: 7.6 FL (ref 6–12)
POTASSIUM SERPL-SCNC: 4.3 MMOL/L (ref 3.7–5.3)
RBC # BLD: 4.35 M/UL (ref 4.5–5.9)
SEG NEUTROPHILS: 57 % (ref 36–66)
SEGMENTED NEUTROPHILS ABSOLUTE COUNT: 3.8 K/UL (ref 1.3–9.1)
SODIUM BLD-SCNC: 138 MMOL/L (ref 135–144)
TOTAL PROTEIN: 7.2 G/DL (ref 6.4–8.3)
TROPONIN, HIGH SENSITIVITY: 17 NG/L (ref 0–22)
TROPONIN, HIGH SENSITIVITY: 18 NG/L (ref 0–22)
WBC # BLD: 6.5 K/UL (ref 3.5–11)

## 2022-05-05 PROCEDURE — 96375 TX/PRO/DX INJ NEW DRUG ADDON: CPT

## 2022-05-05 PROCEDURE — 6370000000 HC RX 637 (ALT 250 FOR IP): Performed by: STUDENT IN AN ORGANIZED HEALTH CARE EDUCATION/TRAINING PROGRAM

## 2022-05-05 PROCEDURE — 80053 COMPREHEN METABOLIC PANEL: CPT

## 2022-05-05 PROCEDURE — 96374 THER/PROPH/DIAG INJ IV PUSH: CPT

## 2022-05-05 PROCEDURE — 93005 ELECTROCARDIOGRAM TRACING: CPT | Performed by: STUDENT IN AN ORGANIZED HEALTH CARE EDUCATION/TRAINING PROGRAM

## 2022-05-05 PROCEDURE — 83690 ASSAY OF LIPASE: CPT

## 2022-05-05 PROCEDURE — 2580000003 HC RX 258: Performed by: STUDENT IN AN ORGANIZED HEALTH CARE EDUCATION/TRAINING PROGRAM

## 2022-05-05 PROCEDURE — 96376 TX/PRO/DX INJ SAME DRUG ADON: CPT

## 2022-05-05 PROCEDURE — 84484 ASSAY OF TROPONIN QUANT: CPT

## 2022-05-05 PROCEDURE — 36415 COLL VENOUS BLD VENIPUNCTURE: CPT

## 2022-05-05 PROCEDURE — 6360000002 HC RX W HCPCS: Performed by: STUDENT IN AN ORGANIZED HEALTH CARE EDUCATION/TRAINING PROGRAM

## 2022-05-05 PROCEDURE — 85025 COMPLETE CBC W/AUTO DIFF WBC: CPT

## 2022-05-05 PROCEDURE — 71045 X-RAY EXAM CHEST 1 VIEW: CPT

## 2022-05-05 PROCEDURE — 99285 EMERGENCY DEPT VISIT HI MDM: CPT

## 2022-05-05 RX ORDER — ONDANSETRON 2 MG/ML
4 INJECTION INTRAMUSCULAR; INTRAVENOUS ONCE
Status: COMPLETED | OUTPATIENT
Start: 2022-05-05 | End: 2022-05-05

## 2022-05-05 RX ORDER — HEPARIN SODIUM 1000 [USP'U]/ML
4000 INJECTION, SOLUTION INTRAVENOUS; SUBCUTANEOUS PRN
Status: DISCONTINUED | OUTPATIENT
Start: 2022-05-05 | End: 2022-05-06 | Stop reason: HOSPADM

## 2022-05-05 RX ORDER — HEPARIN SODIUM 1000 [USP'U]/ML
2000 INJECTION, SOLUTION INTRAVENOUS; SUBCUTANEOUS PRN
Status: DISCONTINUED | OUTPATIENT
Start: 2022-05-05 | End: 2022-05-06 | Stop reason: HOSPADM

## 2022-05-05 RX ORDER — HEPARIN SODIUM 10000 [USP'U]/100ML
5-30 INJECTION, SOLUTION INTRAVENOUS CONTINUOUS
Status: DISCONTINUED | OUTPATIENT
Start: 2022-05-05 | End: 2022-05-06 | Stop reason: HOSPADM

## 2022-05-05 RX ORDER — 0.9 % SODIUM CHLORIDE 0.9 %
1000 INTRAVENOUS SOLUTION INTRAVENOUS ONCE
Status: COMPLETED | OUTPATIENT
Start: 2022-05-05 | End: 2022-05-06

## 2022-05-05 RX ORDER — HEPARIN SODIUM 1000 [USP'U]/ML
4000 INJECTION, SOLUTION INTRAVENOUS; SUBCUTANEOUS ONCE
Status: COMPLETED | OUTPATIENT
Start: 2022-05-05 | End: 2022-05-05

## 2022-05-05 RX ORDER — NITROGLYCERIN 0.4 MG/1
0.4 TABLET SUBLINGUAL EVERY 5 MIN PRN
Status: DISCONTINUED | OUTPATIENT
Start: 2022-05-05 | End: 2022-05-06 | Stop reason: HOSPADM

## 2022-05-05 RX ORDER — MORPHINE SULFATE 2 MG/ML
2 INJECTION, SOLUTION INTRAMUSCULAR; INTRAVENOUS ONCE
Status: COMPLETED | OUTPATIENT
Start: 2022-05-05 | End: 2022-05-05

## 2022-05-05 RX ORDER — ASPIRIN 81 MG/1
324 TABLET, CHEWABLE ORAL ONCE
Status: COMPLETED | OUTPATIENT
Start: 2022-05-05 | End: 2022-05-05

## 2022-05-05 RX ADMIN — ONDANSETRON 4 MG: 2 INJECTION INTRAMUSCULAR; INTRAVENOUS at 21:19

## 2022-05-05 RX ADMIN — MORPHINE SULFATE 2 MG: 2 INJECTION, SOLUTION INTRAMUSCULAR; INTRAVENOUS at 22:25

## 2022-05-05 RX ADMIN — SODIUM CHLORIDE 1000 ML: 9 INJECTION, SOLUTION INTRAVENOUS at 23:51

## 2022-05-05 RX ADMIN — HEPARIN SODIUM AND DEXTROSE 11.67 UNITS/KG/HR: 10000; 5 INJECTION INTRAVENOUS at 23:32

## 2022-05-05 RX ADMIN — ASPIRIN 324 MG: 81 TABLET, CHEWABLE ORAL at 21:09

## 2022-05-05 RX ADMIN — HEPARIN SODIUM 4000 UNITS: 1000 INJECTION INTRAVENOUS; SUBCUTANEOUS at 23:30

## 2022-05-05 ASSESSMENT — ENCOUNTER SYMPTOMS
COUGH: 0
BACK PAIN: 0
EYE PAIN: 0
FACIAL SWELLING: 0
NAUSEA: 0
COLOR CHANGE: 0
ABDOMINAL PAIN: 0
SORE THROAT: 0
SHORTNESS OF BREATH: 0
DIARRHEA: 0
EYE REDNESS: 0
RHINORRHEA: 0
VOMITING: 0

## 2022-05-05 ASSESSMENT — PAIN SCALES - GENERAL: PAINLEVEL_OUTOF10: 5

## 2022-05-05 NOTE — Clinical Note
Discharge Plan[de-identified] Other/[facility])   Telemetry/Cardiac Monitoring Required?: Yes

## 2022-05-06 ENCOUNTER — HOSPITAL ENCOUNTER (INPATIENT)
Age: 82
LOS: 1 days | Discharge: HOME | DRG: 247 | End: 2022-05-07
Attending: INTERNAL MEDICINE | Admitting: INTERNAL MEDICINE
Payer: MEDICARE

## 2022-05-06 ENCOUNTER — HOSPITAL ENCOUNTER (OUTPATIENT)
Dept: CARDIAC CATH/INVASIVE PROCEDURES | Age: 82
Discharge: HOME | DRG: 247 | End: 2022-05-06
Payer: MEDICARE

## 2022-05-06 VITALS
SYSTOLIC BLOOD PRESSURE: 100 MMHG | OXYGEN SATURATION: 95 % | DIASTOLIC BLOOD PRESSURE: 56 MMHG | HEIGHT: 70 IN | RESPIRATION RATE: 15 BRPM | HEART RATE: 63 BPM | TEMPERATURE: 98.4 F | WEIGHT: 189 LBS | BODY MASS INDEX: 27.06 KG/M2

## 2022-05-06 PROBLEM — R07.9 CHEST PAIN: Status: ACTIVE | Noted: 2022-05-06

## 2022-05-06 PROBLEM — I20.0 UNSTABLE ANGINA (HCC): Status: ACTIVE | Noted: 2022-05-06

## 2022-05-06 LAB
ANTI-XA UNFRAC HEPARIN: 0.47 IU/L (ref 0.3–0.7)
EKG ATRIAL RATE: 64 BPM
EKG ATRIAL RATE: 68 BPM
EKG ATRIAL RATE: 71 BPM
EKG P AXIS: 47 DEGREES
EKG P AXIS: 49 DEGREES
EKG P AXIS: 50 DEGREES
EKG P-R INTERVAL: 178 MS
EKG P-R INTERVAL: 180 MS
EKG P-R INTERVAL: 192 MS
EKG Q-T INTERVAL: 376 MS
EKG Q-T INTERVAL: 386 MS
EKG Q-T INTERVAL: 412 MS
EKG QRS DURATION: 78 MS
EKG QRS DURATION: 80 MS
EKG QRS DURATION: 82 MS
EKG QTC CALCULATION (BAZETT): 408 MS
EKG QTC CALCULATION (BAZETT): 410 MS
EKG QTC CALCULATION (BAZETT): 425 MS
EKG R AXIS: -14 DEGREES
EKG R AXIS: -2 DEGREES
EKG R AXIS: -4 DEGREES
EKG T AXIS: 41 DEGREES
EKG T AXIS: 84 DEGREES
EKG T AXIS: 94 DEGREES
EKG VENTRICULAR RATE: 64 BPM
EKG VENTRICULAR RATE: 68 BPM
EKG VENTRICULAR RATE: 71 BPM
GLUCOSE BLD-MCNC: 102 MG/DL (ref 75–110)
GLUCOSE BLD-MCNC: 176 MG/DL (ref 75–110)
TROPONIN, HIGH SENSITIVITY: 19 NG/L (ref 0–22)
TROPONIN, HIGH SENSITIVITY: 21 NG/L (ref 0–22)
TROPONIN, HIGH SENSITIVITY: 31 NG/L (ref 0–22)

## 2022-05-06 PROCEDURE — 99223 1ST HOSP IP/OBS HIGH 75: CPT | Performed by: FAMILY MEDICINE

## 2022-05-06 PROCEDURE — C1725 CATH, TRANSLUMIN NON-LASER: HCPCS

## 2022-05-06 PROCEDURE — G0378 HOSPITAL OBSERVATION PER HR: HCPCS

## 2022-05-06 PROCEDURE — 6370000000 HC RX 637 (ALT 250 FOR IP)

## 2022-05-06 PROCEDURE — C1760 CLOSURE DEV, VASC: HCPCS

## 2022-05-06 PROCEDURE — 84484 ASSAY OF TROPONIN QUANT: CPT

## 2022-05-06 PROCEDURE — 2580000003 HC RX 258: Performed by: INTERNAL MEDICINE

## 2022-05-06 PROCEDURE — B2111ZZ FLUOROSCOPY OF MULTIPLE CORONARY ARTERIES USING LOW OSMOLAR CONTRAST: ICD-10-PCS | Performed by: INTERNAL MEDICINE

## 2022-05-06 PROCEDURE — 2580000003 HC RX 258: Performed by: STUDENT IN AN ORGANIZED HEALTH CARE EDUCATION/TRAINING PROGRAM

## 2022-05-06 PROCEDURE — 36415 COLL VENOUS BLD VENIPUNCTURE: CPT

## 2022-05-06 PROCEDURE — G0379 DIRECT REFER HOSPITAL OBSERV: HCPCS

## 2022-05-06 PROCEDURE — 1200000000 HC SEMI PRIVATE

## 2022-05-06 PROCEDURE — 4A023N7 MEASUREMENT OF CARDIAC SAMPLING AND PRESSURE, LEFT HEART, PERCUTANEOUS APPROACH: ICD-10-PCS | Performed by: INTERNAL MEDICINE

## 2022-05-06 PROCEDURE — B2121ZZ FLUOROSCOPY OF SINGLE CORONARY ARTERY BYPASS GRAFT USING LOW OSMOLAR CONTRAST: ICD-10-PCS | Performed by: INTERNAL MEDICINE

## 2022-05-06 PROCEDURE — 6360000004 HC RX CONTRAST MEDICATION

## 2022-05-06 PROCEDURE — 2500000003 HC RX 250 WO HCPCS

## 2022-05-06 PROCEDURE — 85520 HEPARIN ASSAY: CPT

## 2022-05-06 PROCEDURE — 2140000000 HC CCU INTERMEDIATE R&B

## 2022-05-06 PROCEDURE — C9600 PERC DRUG-EL COR STENT SING: HCPCS

## 2022-05-06 PROCEDURE — C1874 STENT, COATED/COV W/DEL SYS: HCPCS

## 2022-05-06 PROCEDURE — 82947 ASSAY GLUCOSE BLOOD QUANT: CPT

## 2022-05-06 PROCEDURE — 2709999900 HC NON-CHARGEABLE SUPPLY

## 2022-05-06 PROCEDURE — 93454 CORONARY ARTERY ANGIO S&I: CPT

## 2022-05-06 PROCEDURE — 6360000002 HC RX W HCPCS

## 2022-05-06 PROCEDURE — 6370000000 HC RX 637 (ALT 250 FOR IP): Performed by: INTERNAL MEDICINE

## 2022-05-06 PROCEDURE — 83036 HEMOGLOBIN GLYCOSYLATED A1C: CPT

## 2022-05-06 PROCEDURE — C1894 INTRO/SHEATH, NON-LASER: HCPCS

## 2022-05-06 PROCEDURE — 93010 ELECTROCARDIOGRAM REPORT: CPT | Performed by: INTERNAL MEDICINE

## 2022-05-06 PROCEDURE — 027034Z DILATION OF CORONARY ARTERY, ONE ARTERY WITH DRUG-ELUTING INTRALUMINAL DEVICE, PERCUTANEOUS APPROACH: ICD-10-PCS | Performed by: INTERNAL MEDICINE

## 2022-05-06 PROCEDURE — C1769 GUIDE WIRE: HCPCS

## 2022-05-06 PROCEDURE — 2580000003 HC RX 258: Performed by: FAMILY MEDICINE

## 2022-05-06 PROCEDURE — C1887 CATHETER, GUIDING: HCPCS

## 2022-05-06 RX ORDER — SODIUM CHLORIDE 0.9 % (FLUSH) 0.9 %
5-40 SYRINGE (ML) INJECTION PRN
Status: DISCONTINUED | OUTPATIENT
Start: 2022-05-06 | End: 2022-05-07 | Stop reason: HOSPADM

## 2022-05-06 RX ORDER — ACETAMINOPHEN 325 MG/1
650 TABLET ORAL EVERY 4 HOURS PRN
Status: DISCONTINUED | OUTPATIENT
Start: 2022-05-06 | End: 2022-05-07 | Stop reason: HOSPADM

## 2022-05-06 RX ORDER — ONDANSETRON 4 MG/1
4 TABLET, ORALLY DISINTEGRATING ORAL EVERY 8 HOURS PRN
Status: DISCONTINUED | OUTPATIENT
Start: 2022-05-06 | End: 2022-05-07 | Stop reason: HOSPADM

## 2022-05-06 RX ORDER — DEXTROSE MONOHYDRATE 50 MG/ML
100 INJECTION, SOLUTION INTRAVENOUS PRN
Status: DISCONTINUED | OUTPATIENT
Start: 2022-05-06 | End: 2022-05-07 | Stop reason: HOSPADM

## 2022-05-06 RX ORDER — ASPIRIN 81 MG/1
81 TABLET, CHEWABLE ORAL DAILY
Status: DISCONTINUED | OUTPATIENT
Start: 2022-05-06 | End: 2022-05-07 | Stop reason: HOSPADM

## 2022-05-06 RX ORDER — LEVOTHYROXINE SODIUM 88 UG/1
88 TABLET ORAL DAILY
Status: DISCONTINUED | OUTPATIENT
Start: 2022-05-07 | End: 2022-05-07 | Stop reason: HOSPADM

## 2022-05-06 RX ORDER — METOPROLOL SUCCINATE 25 MG/1
25 TABLET, EXTENDED RELEASE ORAL DAILY
Status: DISCONTINUED | OUTPATIENT
Start: 2022-05-06 | End: 2022-05-07 | Stop reason: HOSPADM

## 2022-05-06 RX ORDER — SODIUM CHLORIDE 9 MG/ML
1000 INJECTION, SOLUTION INTRAVENOUS CONTINUOUS
Status: DISCONTINUED | OUTPATIENT
Start: 2022-05-06 | End: 2022-05-06 | Stop reason: HOSPADM

## 2022-05-06 RX ORDER — 0.9 % SODIUM CHLORIDE 0.9 %
500 INTRAVENOUS SOLUTION INTRAVENOUS ONCE
Status: COMPLETED | OUTPATIENT
Start: 2022-05-06 | End: 2022-05-06

## 2022-05-06 RX ORDER — ACETAMINOPHEN 325 MG/1
650 TABLET ORAL EVERY 4 HOURS PRN
Status: DISCONTINUED | OUTPATIENT
Start: 2022-05-06 | End: 2022-05-06 | Stop reason: HOSPADM

## 2022-05-06 RX ORDER — ISOSORBIDE MONONITRATE 30 MG/1
30 TABLET, EXTENDED RELEASE ORAL DAILY
Status: DISCONTINUED | OUTPATIENT
Start: 2022-05-06 | End: 2022-05-06 | Stop reason: HOSPADM

## 2022-05-06 RX ORDER — ASPIRIN 81 MG/1
81 TABLET, CHEWABLE ORAL DAILY
Status: DISCONTINUED | OUTPATIENT
Start: 2022-05-06 | End: 2022-05-06 | Stop reason: SDUPTHER

## 2022-05-06 RX ORDER — SODIUM CHLORIDE 0.9 % (FLUSH) 0.9 %
5-40 SYRINGE (ML) INJECTION PRN
Status: DISCONTINUED | OUTPATIENT
Start: 2022-05-06 | End: 2022-05-06 | Stop reason: HOSPADM

## 2022-05-06 RX ORDER — SODIUM CHLORIDE 0.9 % (FLUSH) 0.9 %
5-40 SYRINGE (ML) INJECTION EVERY 12 HOURS SCHEDULED
Status: DISCONTINUED | OUTPATIENT
Start: 2022-05-06 | End: 2022-05-07 | Stop reason: HOSPADM

## 2022-05-06 RX ORDER — M-VIT,TX,IRON,MINS/CALC/FOLIC 27MG-0.4MG
1 TABLET ORAL DAILY
Status: DISCONTINUED | OUTPATIENT
Start: 2022-05-06 | End: 2022-05-07 | Stop reason: HOSPADM

## 2022-05-06 RX ORDER — ACETAMINOPHEN 650 MG/1
650 SUPPOSITORY RECTAL EVERY 6 HOURS PRN
Status: DISCONTINUED | OUTPATIENT
Start: 2022-05-06 | End: 2022-05-07 | Stop reason: HOSPADM

## 2022-05-06 RX ORDER — SODIUM CHLORIDE 9 MG/ML
INJECTION, SOLUTION INTRAVENOUS PRN
Status: DISCONTINUED | OUTPATIENT
Start: 2022-05-06 | End: 2022-05-07 | Stop reason: HOSPADM

## 2022-05-06 RX ORDER — NICOTINE POLACRILEX 4 MG
15 LOZENGE BUCCAL PRN
Status: DISCONTINUED | OUTPATIENT
Start: 2022-05-06 | End: 2022-05-06 | Stop reason: RX

## 2022-05-06 RX ORDER — 0.9 % SODIUM CHLORIDE 0.9 %
1000 INTRAVENOUS SOLUTION INTRAVENOUS CONTINUOUS
Status: DISCONTINUED | OUTPATIENT
Start: 2022-05-06 | End: 2022-05-06 | Stop reason: HOSPADM

## 2022-05-06 RX ORDER — ROSUVASTATIN CALCIUM 10 MG/1
10 TABLET, COATED ORAL DAILY
Status: DISCONTINUED | OUTPATIENT
Start: 2022-05-06 | End: 2022-05-06 | Stop reason: HOSPADM

## 2022-05-06 RX ORDER — LANOLIN ALCOHOL/MO/W.PET/CERES
400 CREAM (GRAM) TOPICAL DAILY
Status: DISCONTINUED | OUTPATIENT
Start: 2022-05-07 | End: 2022-05-07 | Stop reason: HOSPADM

## 2022-05-06 RX ORDER — DEXTROSE MONOHYDRATE 25 G/50ML
12.5 INJECTION, SOLUTION INTRAVENOUS PRN
Status: DISCONTINUED | OUTPATIENT
Start: 2022-05-06 | End: 2022-05-06 | Stop reason: RX

## 2022-05-06 RX ORDER — ACETAMINOPHEN 500 MG
1000 TABLET ORAL EVERY 6 HOURS PRN
Status: DISCONTINUED | OUTPATIENT
Start: 2022-05-06 | End: 2022-05-06

## 2022-05-06 RX ORDER — ONDANSETRON 2 MG/ML
4 INJECTION INTRAMUSCULAR; INTRAVENOUS EVERY 6 HOURS PRN
Status: DISCONTINUED | OUTPATIENT
Start: 2022-05-06 | End: 2022-05-07 | Stop reason: HOSPADM

## 2022-05-06 RX ORDER — VITAMIN B COMPLEX
1000 TABLET ORAL DAILY
Status: DISCONTINUED | OUTPATIENT
Start: 2022-05-07 | End: 2022-05-07 | Stop reason: HOSPADM

## 2022-05-06 RX ORDER — SODIUM CHLORIDE 9 MG/ML
INJECTION, SOLUTION INTRAVENOUS CONTINUOUS
Status: DISCONTINUED | OUTPATIENT
Start: 2022-05-06 | End: 2022-05-07 | Stop reason: HOSPADM

## 2022-05-06 RX ORDER — ACETAMINOPHEN 325 MG/1
650 TABLET ORAL EVERY 6 HOURS PRN
Status: DISCONTINUED | OUTPATIENT
Start: 2022-05-06 | End: 2022-05-06

## 2022-05-06 RX ORDER — SODIUM CHLORIDE 0.9 % (FLUSH) 0.9 %
5-40 SYRINGE (ML) INJECTION EVERY 12 HOURS SCHEDULED
Status: DISCONTINUED | OUTPATIENT
Start: 2022-05-06 | End: 2022-05-06 | Stop reason: HOSPADM

## 2022-05-06 RX ORDER — LOSARTAN POTASSIUM 100 MG/1
100 TABLET ORAL DAILY
Status: DISCONTINUED | OUTPATIENT
Start: 2022-05-06 | End: 2022-05-07 | Stop reason: HOSPADM

## 2022-05-06 RX ORDER — ROSUVASTATIN CALCIUM 10 MG/1
20 TABLET, COATED ORAL DAILY
Status: DISCONTINUED | OUTPATIENT
Start: 2022-05-06 | End: 2022-05-07 | Stop reason: HOSPADM

## 2022-05-06 RX ORDER — POLYETHYLENE GLYCOL 3350 17 G/17G
17 POWDER, FOR SOLUTION ORAL DAILY PRN
Status: DISCONTINUED | OUTPATIENT
Start: 2022-05-06 | End: 2022-05-07 | Stop reason: HOSPADM

## 2022-05-06 RX ORDER — SODIUM CHLORIDE 9 MG/ML
INJECTION, SOLUTION INTRAVENOUS PRN
Status: DISCONTINUED | OUTPATIENT
Start: 2022-05-06 | End: 2022-05-06 | Stop reason: HOSPADM

## 2022-05-06 RX ORDER — ONDANSETRON 2 MG/ML
4 INJECTION INTRAMUSCULAR; INTRAVENOUS EVERY 6 HOURS PRN
Status: DISCONTINUED | OUTPATIENT
Start: 2022-05-06 | End: 2022-05-06 | Stop reason: HOSPADM

## 2022-05-06 RX ORDER — ONDANSETRON 4 MG/1
4 TABLET, ORALLY DISINTEGRATING ORAL EVERY 8 HOURS PRN
Status: DISCONTINUED | OUTPATIENT
Start: 2022-05-06 | End: 2022-05-06 | Stop reason: HOSPADM

## 2022-05-06 RX ADMIN — SODIUM CHLORIDE 500 ML: 0.9 INJECTION, SOLUTION INTRAVENOUS at 04:39

## 2022-05-06 RX ADMIN — SODIUM CHLORIDE 1000 ML: 9 INJECTION, SOLUTION INTRAVENOUS at 06:56

## 2022-05-06 RX ADMIN — SODIUM CHLORIDE, PRESERVATIVE FREE 10 ML: 5 INJECTION INTRAVENOUS at 20:19

## 2022-05-06 RX ADMIN — METOPROLOL SUCCINATE 25 MG: 25 TABLET, EXTENDED RELEASE ORAL at 18:17

## 2022-05-06 RX ADMIN — ROSUVASTATIN CALCIUM 20 MG: 10 TABLET, FILM COATED ORAL at 20:19

## 2022-05-06 RX ADMIN — MULTIPLE VITAMINS W/ MINERALS TAB 1 TABLET: TAB at 18:17

## 2022-05-06 RX ADMIN — SODIUM CHLORIDE 1000 ML: 9 INJECTION, SOLUTION INTRAVENOUS at 01:18

## 2022-05-06 RX ADMIN — TICAGRELOR 90 MG: 90 TABLET ORAL at 20:19

## 2022-05-06 RX ADMIN — LOSARTAN POTASSIUM 100 MG: 100 TABLET, FILM COATED ORAL at 18:17

## 2022-05-06 RX ADMIN — SODIUM CHLORIDE: 9 INJECTION, SOLUTION INTRAVENOUS at 20:20

## 2022-05-06 ASSESSMENT — PAIN - FUNCTIONAL ASSESSMENT
PAIN_FUNCTIONAL_ASSESSMENT: NONE - DENIES PAIN
PAIN_FUNCTIONAL_ASSESSMENT: NONE - DENIES PAIN

## 2022-05-06 NOTE — ED NOTES
The following labs labeled with pt sticker and tubed to lab:     [x] Blue     [] Lavender   [] on ice  [] Green/yellow  [] Green/black [] on ice  [] Yellow  [] Red  [] Pink         Heather Camarillo RN  05/06/22 3660

## 2022-05-06 NOTE — ED NOTES
Dr. Curran Kras informed of troponin result of 31, no further orders. RN sent a message to Dr. Jong Angel on perfect serve with the critical results.       Fiona Leroy RN  05/06/22 6478

## 2022-05-06 NOTE — ED NOTES
Report given to Ministerio Jade RN. Opportunities for questions offered.       Oneyda Han RN  05/06/22 6081

## 2022-05-06 NOTE — ED NOTES
Dr. Livia Butler instructed RN to page cardiology. Dr. Kelsy Ny (on-call cardiologist) paged and informed of critical troponin and difficulty with maintaining adequate blood pressure readings.       Mayela Cabrera RN  05/06/22 8165

## 2022-05-06 NOTE — H&P
Family Medicine Admit Note    PCP: Inga Corado MD    Date of Admission: 5/5/2022    Date of Service: Pt seen/examined on 5/6/22 and Admitted to Inpatient. Chief Complaint:  Chest Pain      History Of Present Illness: The patient is a [de-identified] y.o. male who presents to [facility] with complaints of chest pain that started acutely yesterday evening while sitting at home. He has a history of CAD and is s/p CABG x2 with stent placements about 3 years ago. The pain is described as heavy, substernal pressure and is non-radiating. No home treatment attempted prior to arrival and the sx's feel similar to his previous MI. Nothing seems to make him feel better or worse. He denies any fevers, chills, cough, SOB, abdominal pain, N/V/D, dysuria or melena. Past Medical History:        Diagnosis Date    CAD (coronary artery disease)     Diabetes mellitus ([address]     Hyperlipidemia     Hypertension     PVC (premature ventricular contraction)     SOB (shortness of breath)        Past Surgical History:        Procedure Laterality Date    CARDIAC SURGERY      COLONOSCOPY  10/5/2011    normal    COLONOSCOPY  3/7/2006    small internal hemorrhoids, diverticulosis    COLONOSCOPY  11/14/2001    small internal hemorrhoids, hyperplastic polyp       Medications Prior to Admission:    Prior to Admission medications    Medication Sig Start Date End Date Taking?  Authorizing Provider   rosuvastatin (CRESTOR) 20 MG tablet Take 20 mg by mouth daily    Historical Provider, MD   acetaminophen (TYLENOL) 500 MG tablet Take 2 tablets by mouth every 6 hours as needed for Pain 11/17/21   Leslie Stevenson MD   magnesium oxide (MAG-OX) 400 MG tablet Take 400 mg by mouth daily    Historical Provider, MD   Multiple Vitamins-Minerals (THERAPEUTIC MULTIVITAMIN-MINERALS) tablet Take 1 tablet by mouth daily 9/6/17   Inga Corado MD   levothyroxine (SYNTHROID) 88 MCG tablet Take 1 tablet by mouth daily 1 tablet daily 9/6/17   Morrison Lefort Zander Tim MD   vitamin D (CHOLECALCIFEROL) 1000 UNIT TABS tablet Take 1,000 Units by mouth daily    Historical Provider, MD   metFORMIN (GLUCOPHAGE) 1000 MG tablet Take 1,000 mg by mouth 2 times daily (with meals)     Historical Provider, MD   losartan (COZAAR) 100 MG tablet Take 100 mg by mouth daily    Historical Provider, MD   aspirin 81 MG chewable tablet Take 81 mg by mouth daily    Historical Provider, MD   Insulin NPH Isophane & Regular (HUMULIN;NOVOLIN) (70-30) 100 UNIT/ML injection pen Inject 30 Units into the skin three times daily [address] [place] [address] Provider, MD       Allergies:  Patient has no known allergies. Social History:  The patient currently lives at home     TOBACCO:   reports that he quit smoking about 38 years ago. His smoking use included pipe. He started smoking about 47 years ago. He has a 4.50 pack-year smoking history. He has never used smokeless tobacco.  ETOH:   reports current alcohol use. Review of Systems - General ROS: positive for  - overweight  Ophthalmic ROS: positive for - uses glasses  Endocrine ROS: positive for - hyperglycemia & hypothyroid  Cardiovascular ROS: positive for - chest pain  Musculoskeletal ROS: positive for - joint stiffness      Family History:      No family history on file. PHYSICAL EXAM:    /67   Pulse 90   Temp 98.4 °F (36.9 °C) (Oral)   Resp 18   Ht 5' 10\" (1.778 m)   Wt 189 lb (85.7 kg)   SpO2 97%   BMI 27.12 kg/m²     General appearance: No apparent distress appears stated age and cooperative. HEENT Normal cephalic, atraumatic without obvious deformity. Pupils equal, round, and reactive to light. Extra ocular muscles intact. Conjunctivae/corneas clear. Neck: Supple, No jugular venous distention/bruits. Trachea midline without thyromegaly or adenopathy with full range of motion. Lungs: Clear to auscultation, bilaterally without Rales/Wheezes/Rhonchi with good respiratory effort.   Heart: Regular rate and rhythm with Normal S1/S2 without murmurs, rubs or gallops, point of maximum impulse non-displaced  Abdomen: Soft, non-tender or non-distended without rigidity or guarding and positive bowel sounds all four quadrants. Extremities: No clubbing, cyanosis, or edema bilaterally. Full range of motion without deformity and normal gait intact. Skin: Skin color, texture, turgor normal.  No rashes or lesions. Neurologic: Alert and oriented X 3, neurovascularly intact with sensory/motor intact upper extremities/lower extremities, bilaterally. Cranial nerves: II-XII intact, grossly non-focal.  Mental status: Alert, oriented, thought content appropriate. CXR:  I have reviewed the CXR with the following interpretation: no evidence of an acute process  EKG:  I have reviewed the EKG with the following interpretation: NSR with nonspecific ST wave changes    CBC   Recent Labs     05/05/22 2050   WBC 6.5   HGB 13.6   HCT 40.1*         RENAL  Recent Labs     05/05/22 2050      K 4.3      CO2 24   BUN 19   CREATININE 1.31*     LFT'S  Recent Labs     05/05/22 2050   AST 22   ALT 19   BILITOT 0.58   ALKPHOS 65     COAG  No results for input(s): INR in the last 72 hours. CARDIAC ENZYMES  No results for input(s): CKTOTAL, CKMB, CKMBINDEX, TROPONINI in the last 72 hours.     U/A:    Lab Results   Component Value Date    COLORU Yellow 12/14/2021    WBCUA 2 TO 5 12/14/2021    RBCUA 0 TO 2 12/14/2021    MUCUS NOT REPORTED 12/14/2021    BACTERIA FEW 12/14/2021    SPECGRAV 1.016 12/14/2021    LEUKOCYTESUR MOD 12/14/2021    GLUCOSEU NEGATIVE 12/14/2021    GLUCOSEU NEGATIVE 05/29/2012    AMORPHOUS NOT REPORTED 12/14/2021       ABG    Lab Results   Component Value Date    DQA2IQL 21.5 02/02/2012    S1ZDYENO 93.5 02/02/2012    PO2ART 88.8 02/02/2012           Active Hospital Problems    Diagnosis Date Noted    Chest pain [R07.9] 05/06/2022     Priority: Medium    Autoimmune thyroiditis [E06.3] 09/20/2021    Type 2 diabetes mellitus without complication, with long-term current use of insulin ([address] [E11.9, Z79.4] 10/31/2016    Coronary artery disease involving native coronary artery of native heart without angina pectoris [I25.10] 10/31/2016    Mixed hyperlipidemia [E78.2] 10/31/2016    Overweight (BMI 25.0-29. 9) [E66.3] 10/31/2016    Primary osteoarthritis involving multiple joints [M89.49] 10/31/2016    Essential hypertension [I10] 03/21/2016         ASSESSMENT/PLAN:  Patient admitted with an Acute MI. Co-morbidities as above.    -Cardiology consulted - patient to transfer to [facility] for cardiac catheterization.  -Complete orders per chart.     DVT Prophylaxis:   Diet: Diet NPO  Code Status: Full Code      Dispo - admitted to ICU       @[facility]@

## 2022-05-06 NOTE — ED NOTES
Called Pt wife Felicita Scanlon to update that patient has been picked up by [address] transport and that pt is on his way to [facility] cath lab. Face sheet with pt information given to transport team. Report given to transport team as well as pertinent lab results/diagnostics. Pt alert and oriented and able to sign for self. Pt being transported on heparin drip.       Nori Oreilly RN  05/06/22 4141

## 2022-05-06 NOTE — PROCEDURES
[address]  [phone]          Cardiac Catheterization       Mr. Yocasta Muniz  YOB: 1940   [id number]      Pre-operative Diagnosis: unstable angina, CAD    Post-operative Diagnosis: same    Informed consent was obtained from the patient after explanation of the procedure including indications, description of the procedure, benefits and possible risks and complications of the procedure, and alternatives. Questions were answered. The patient's history was reviewed and a directed physical examination was performed prior to the procedure      Procedure: SVG angiography Coronary angiography, PTCA HERACLIO Cx       Cors    LM -30% distal taper    LAD - Occluded proximally. Far distal disease distal to graft insertion. SVG-LAD widely patent, diseased    LCF - high grade instent stenosis proximally, successfully stented with HERACLIO    RCA - occluded proximally     SVG-PDA patent with moderate insertion stenosis        Anesthesia: conscious sedation    Surgeons/Assistants: Oz    Estimated Blood Loss: Minimal    Complications: None      Recommendations DAPT. Medical therapy as needed.

## 2022-05-06 NOTE — ED NOTES
The following labs labeled with pt sticker and tubed to lab:     [] Blue     [] Lavender   [] on ice  [x] Green/yellow  [] Green/black [] on ice  [] Yellow  [] Red  [] Alma Rosa Lopez RN  05/06/22 6017

## 2022-05-06 NOTE — ED NOTES
Attempted to call report to call labx2 no answer. Spoke to ER charge nurse, spoke to Pre-op department, attempted to transfer me to cath lab number as well without answer.       Alem Ann RN  05/06/22 0811

## 2022-05-06 NOTE — ED NOTES
Dr Huntley Resides speaks with Dr Ludwig Young to make Dr Ludwig Young aware of the pt's transfer to [facility] for a heart cath.      Nimesh Dhaliwal RN  05/06/22 8781

## 2022-05-06 NOTE — ED NOTES
Per Dr. Halle Villa, consult critical care and initiate 0.9% NS at 150ml/hour       Blima Severance, RN  05/06/22 4653

## 2022-05-06 NOTE — ED NOTES
RN sent Dr. Jacquie Galvan a perfectserve message regarding the pts blood pressure.       Marcell Flores RN  05/06/22 5507

## 2022-05-06 NOTE — ED NOTES
Attempted to call report to [facility] cath lab x1, no answer.       Ru Carlin RN  05/06/22 9552

## 2022-05-06 NOTE — ED NOTES
Called pt wife Maribell Ortiz at his request. Updated her to patient condition and plan of care, plan is to transfer to [facility] for cath lab.       Kaia Amaya RN  05/06/22 5980

## 2022-05-06 NOTE — PROGRESS NOTES
Pulmonary critical care    Spoke to [facility], charge nurse. Was told that patient is being transferred to [facility] for cardiac catheterization. I was told that we do not have to see the patient.     Skip Keane MD

## 2022-05-06 NOTE — ED PROVIDER NOTES
EMERGENCY DEPARTMENT ENCOUNTER    Pt Name: Renny La  MRN: [id number]  [place] 1940  Date of evaluation: 5/5/22  CHIEF COMPLAINT       Chief Complaint   Patient presents with    Chest Pain     HISTORY OF PRESENT ILLNESS   HPI  72-year-old male history of coronary artery disease status post double bypass and stent placement about 3 years ago presents for evaluation of chest pain. Symptoms started acutely about an hour and a half ago. The patient was sitting in a chair when symptoms started. Symptoms are moderate. Symptoms are constant. Patient describes a substernal nonradiating heavy type pain. No known alleviating or exacerbating factors. History of similar symptoms with MI in the past.  No home treatment prior to arrival.  No other recent illness. REVIEW OF SYSTEMS     Review of Systems   Constitutional: Negative for chills and fatigue. HENT: Negative for facial swelling, nosebleeds, rhinorrhea and sore throat. Eyes: Negative for pain and redness. Respiratory: Negative for cough and shortness of breath. Cardiovascular: Positive for chest pain. Negative for leg swelling. Gastrointestinal: Negative for abdominal pain, diarrhea, nausea and vomiting. Genitourinary: Negative for flank pain and hematuria. Musculoskeletal: Negative for arthralgias and back pain. Skin: Negative for color change and rash. Neurological: Negative for dizziness, tremors, facial asymmetry, speech difficulty, weakness and numbness.      PASTMEDICAL HISTORY     Past Medical History:   Diagnosis Date    CAD (coronary artery disease)     Diabetes mellitus ([address]     Hyperlipidemia     Hypertension     PVC (premature ventricular contraction)     SOB (shortness of breath)      Past Problem List  Patient Active Problem List   Diagnosis Code    Diverticulosis of colon K57.30    Essential hypertension I10    Type 2 diabetes mellitus ([address] E11.9    Hyperlipidemia E78.5    Mixed hyperlipidemia E78.2    Type 2 diabetes mellitus without complication, with long-term current use of insulin (HCC) E11.9, Z79.4    Coronary artery disease involving native coronary artery of native heart without angina pectoris I25.10    Primary osteoarthritis involving multiple joints M89.49    Overweight (BMI 25.0-29. 9) E66.3    Status post cardiac catheterization Z98.890    Autoimmune thyroiditis E06.3    Disorder associated with type 2 diabetes mellitus ([facility] [address] E11.8    Hesitancy of micturition R39.11    PVC (premature ventricular contraction) I49.3    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    SOB (shortness of breath) R06.02    Ventricular premature depolarization I49.3    Vitamin deficiency E56.9     SURGICAL HISTORY       Past Surgical History:   Procedure Laterality Date    CARDIAC SURGERY      COLONOSCOPY  10/5/2011    normal    COLONOSCOPY  3/7/2006    small internal hemorrhoids, diverticulosis    COLONOSCOPY  11/14/2001    small internal hemorrhoids, hyperplastic polyp     CURRENT MEDICATIONS       Previous Medications    ACETAMINOPHEN (TYLENOL) 500 MG TABLET    Take 2 tablets by mouth every 6 hours as needed for Pain    ASPIRIN 81 MG CHEWABLE TABLET    Take 81 mg by mouth daily    INSULIN NPH ISOPHANE & REGULAR (HUMULIN;NOVOLIN) (70-30) 100 UNIT/ML INJECTION PEN    Inject 30 Units into the skin three times daily SLIDING SCALE    LEVOTHYROXINE (SYNTHROID) 88 MCG TABLET    Take 1 tablet by mouth daily 1 tablet daily    LOSARTAN (COZAAR) 100 MG TABLET    Take 100 mg by mouth daily    MAGNESIUM OXIDE (MAG-OX) 400 MG TABLET    Take 400 mg by mouth daily    METFORMIN (GLUCOPHAGE) 1000 MG TABLET    Take 1,000 mg by mouth 2 times daily (with meals)     MULTIPLE VITAMINS-MINERALS (THERAPEUTIC MULTIVITAMIN-MINERALS) TABLET    Take 1 tablet by mouth daily    ROSUVASTATIN (CRESTOR) 20 MG TABLET    Take 20 mg by mouth daily    VITAMIN D (CHOLECALCIFEROL) 1000 UNIT TABS TABLET    Take 1,000 Units by mouth daily ALLERGIES     has No Known Allergies. FAMILY HISTORY     has no family status information on file. SOCIAL HISTORY       Social History     Tobacco Use    Smoking status: Former Smoker     Packs/day: 0.50     Years: 9.00     Pack years: 4.50     Types: Pipe     Start date: 1974     Quit date: 1983     Years since quittin.6    Smokeless tobacco: Never Used   Substance Use Topics    Alcohol use: Yes    Drug use: No     PHYSICAL EXAM     INITIAL VITALS: BP (!) 104/51   Pulse 66   Temp 98.4 °F (36.9 °C) (Oral)   Resp 22   Ht 5' 10\" (1.778 m)   Wt 189 lb (85.7 kg)   SpO2 96%   BMI 27.12 kg/m²    Physical Exam  Vitals and nursing note reviewed. Constitutional:       Appearance: Normal appearance. HENT:      Head: Normocephalic and atraumatic. Nose: Nose normal.   Eyes:      Extraocular Movements: Extraocular movements intact. Pupils: Pupils are equal, round, and reactive to light. Cardiovascular:      Rate and Rhythm: Normal rate and regular rhythm. Pulmonary:      Effort: Pulmonary effort is normal. No respiratory distress. Breath sounds: Normal breath sounds. Abdominal:      General: Abdomen is flat. There is no distension. Palpations: Abdomen is soft. There is no mass. Musculoskeletal:         General: No swelling. Normal range of motion. Cervical back: Normal range of motion. No rigidity. Skin:     General: Skin is warm and dry. Neurological:      General: No focal deficit present. Mental Status: He is alert. Mental status is at baseline. Cranial Nerves: No cranial nerve deficit. MEDICAL DECISION MAKING:   [de-identified] male presents for evaluation with acute onset of chest pain earlier tonight. Initial concern is for acute coronary syndrome with patient's history and description of symptoms. Initial troponin is 17, repeat is 18.   Patient having persistent symptoms on reassessment, given a dose of morphine with improvement in symptoms, EKG changes discussed with cardiology will start on heparin for unstable angina, admit under medicine and plan for cardiac cath in the morning. Discussed with patient and he is agreeable with plan. Discussed with internal medicine Dr. Silvia Loza who will admit. CRITICAL CARE:   30 minutes for management of unstable angina requiring heparin drip, cardiology consult, admission     PROCEDURES:    Procedures    DIAGNOSTIC RESULTS   EKG:All EKG's are interpreted by the Emergency Department Physician who either signs or Co-signs this chart in the absence of a cardiologist.    Sinus rhythm rate of 68 occasional PVCs left axis normal intervals ST depressions in 1 and aVL, slight ST deviation in aVR, new from prior    RADIOLOGY:All plain film, CT, MRI, and formal ultrasound images (except ED bedside ultrasound) are read by the radiologist, see reports below, unless otherwisenoted in MDM or here. XR CHEST PORTABLE   Final Result   Marginal inspiration, without evidence of acute cardiopulmonary disease           LABS: All lab results were reviewed by myself, and all abnormals are listed below.   Labs Reviewed   CBC WITH AUTO DIFFERENTIAL - Abnormal; Notable for the following components:       Result Value    RBC 4.35 (*)     Hematocrit 40.1 (*)     Monocytes 8 (*)     All other components within normal limits   COMPREHENSIVE METABOLIC PANEL W/ REFLEX TO MG FOR LOW K - Abnormal; Notable for the following components:    Glucose 267 (*)     CREATININE 1.31 (*)     GFR Non- 53 (*)     All other components within normal limits   LIPASE   TROPONIN   TROPONIN   ANTI-XA, UNFRACTIONATED HEPARIN   ANTI-XA, UNFRACTIONATED HEPARIN       EMERGENCY DEPARTMENTCOURSE:         Vitals:    Vitals:    05/05/22 2332 05/05/22 2352 05/06/22 0003 05/06/22 0050   BP: (S) 83/60 (S) (!) 78/51 99/85 (!) 104/51   Pulse: 68 73 86 66   Resp: 22 17 22   Temp:       TempSrc:       SpO2: 94%  97% 96%   Weight:       Height: The patient was given the following medications while in the emergency department:  Orders Placed This Encounter   Medications    aspirin chewable tablet 324 mg    nitroGLYCERIN (NITROSTAT) SL tablet 0.4 mg    ondansetron (ZOFRAN) injection 4 mg    morphine (PF) injection 2 mg    heparin (porcine) injection 4,000 Units    heparin (porcine) injection 4,000 Units    heparin (porcine) injection 2,000 Units    heparin 25,000 units in dextrose 5% 250 mL (premix) infusion    0.9 % sodium chloride IV bolus 1,000 mL     CONSULTS:  IP CONSULT TO CARDIOLOGY  IP CONSULT TO INTERNAL MEDICINE    FINAL IMPRESSION      1. Unstable angina [facility])          DISPOSITION/PLAN   DISPOSITION Decision To Admit 05/05/2022 11:02:57 PM      PATIENT REFERRED TO:  No follow-up provider specified. DISCHARGE MEDICATIONS:  New Prescriptions    No medications on file     The care is provided during an unprecedented national emergency due to the novel coronavirus, COVID 19.   Mac Boeck, MD Mac Boeck, MD  05/06/22 5628

## 2022-05-06 NOTE — ED NOTES
RN informed Dr. Jose Antonio Tate of patients blood pressure of 83/60. Confirmed to initiate heparin gtt. RN informed MD of possible run of v-tach on monitor, verbal order for EKG.       Lesly Camara RN  05/05/22 8251

## 2022-05-06 NOTE — CONSULTS
[facility]      Date of Admission:  5/5/2022    Date of Consultation:  5/6/2022    PCP:  Livier Sanford MD      REASON FOR CONSULT: Chest pain. HISTORY OF PRESENT ILLNESS:  Sarah King is a [de-identified] y.o. maleWith the ASCAD, CABG x 2 with LIMA to LAD, SVG to RCA and with prior stent of mid left circumflex artery in 2019, essential hypertension, hyperlipidemia, DJD who presents with prolonged grade 7 to 8/10 mid anterior chest tightness lasting 1-1/2 hours, localized with no radiation, no associated shortness of breath or palpitation or lightheadedness or dizziness or cough or fever or chills or bleeding or leg swelling or syncope and presented to the emergency room. The symptoms were similar prior to his CABG and stent placement. Since admission, patient was started on IV heparin drip. At the time of my evaluation in the emergency room, patient has no ongoing chest pain. Was kept NPO for cardiac catheterization. Reviewed most recent office visit note by Dr. Luis Angel Stahl in the EHR as charted. Please refer to admitting history and physical and other providers notes for further details. PMH:   has a past medical history of CAD (coronary artery disease), Diabetes mellitus ([address] Hyperlipidemia, Hypertension, PVC (premature ventricular contraction), and SOB (shortness of breath). PSH:   has a past surgical history that includes Colonoscopy (10/5/2011); Colonoscopy (3/7/2006); Colonoscopy (11/14/2001); and Cardiac surgery. Allergies:  No Known Allergies     Home Meds:    Prior to Admission medications    Medication Sig Start Date End Date Taking?  Authorizing Provider   rosuvastatin (CRESTOR) 20 MG tablet Take 20 mg by mouth daily    Historical Provider, MD   acetaminophen (TYLENOL) 500 MG tablet Take 2 tablets by mouth every 6 hours as needed for Pain 11/17/21   Shahida Aquino MD   magnesium oxide (MAG-OX) 400 MG tablet Take 400 mg by mouth daily    Historical Provider, MD   Multiple Vitamins-Minerals (THERAPEUTIC MULTIVITAMIN-MINERALS) tablet Take 1 tablet by mouth daily 9/6/17   Jay Vallejo MD   levothyroxine (SYNTHROID) 88 MCG tablet Take 1 tablet by mouth daily 1 tablet daily 9/6/17   Jay Vallejo MD   vitamin D (CHOLECALCIFEROL) 1000 UNIT TABS tablet Take 1,000 Units by mouth daily    Historical Provider, MD   metFORMIN (GLUCOPHAGE) 1000 MG tablet Take 1,000 mg by mouth 2 times daily (with meals)     Historical Provider, MD   losartan (COZAAR) 100 MG tablet Take 100 mg by mouth daily    Historical Provider, MD   aspirin 81 MG chewable tablet Take 81 mg by mouth daily    Historical Provider, MD   Insulin NPH Isophane & Regular (HUMULIN;NOVOLIN) (70-30) 100 UNIT/ML injection pen Inject 30 Units into the skin three times daily [address] Provider, MD        [facility] Meds:    Current Facility-Administered Medications   Medication Dose Route Frequency Provider Last Rate Last Admin    sodium chloride flush 0.9 % injection 5-40 mL  5-40 mL IntraVENous 2 times per day Jay Vallejo MD        sodium chloride flush 0.9 % injection 5-40 mL  5-40 mL IntraVENous PRN Jay Vallejo MD        0.9 % sodium chloride infusion   IntraVENous PRN Jay Vallejo MD        acetaminophen (TYLENOL) tablet 650 mg  650 mg Oral Q4H PRN Jay Vallejo MD        ondansetron (ZOFRAN-ODT) disintegrating tablet 4 mg  4 mg Oral Q8H PRN Jay Vallejo MD        Or    ondansetron [facility]) injection 4 mg  4 mg IntraVENous Q6H PRN Jay Vallejo MD        0.9 % sodium chloride infusion  1,000 mL IntraVENous Continuous Mac Boeck, MD   Stopped at 05/06/22 0439    nitroGLYCERIN (NITROSTAT) SL tablet 0.4 mg  0.4 mg SubLINGual Q5 Min PRN Mac Boeck, MD        heparin (porcine) injection 4,000 Units  4,000 Units IntraVENous PRN Mac Boeck, MD        heparin (porcine) injection 2,000 Units  2,000 Units IntraVENous PRN Pa Vidales MD        heparin 25,000 units in dextrose 5% 250 mL (premix) infusion  5-30 Units/kg/hr IntraVENous Continuous Pa Vidales MD 10 mL/hr at 22 0556 11.67 Units/kg/hr at 22 0556     Current Outpatient Medications   Medication Sig Dispense Refill    rosuvastatin (CRESTOR) 20 MG tablet Take 20 mg by mouth daily      acetaminophen (TYLENOL) 500 MG tablet Take 2 tablets by mouth every 6 hours as needed for Pain 60 tablet 0    magnesium oxide (MAG-OX) 400 MG tablet Take 400 mg by mouth daily      Multiple Vitamins-Minerals (THERAPEUTIC MULTIVITAMIN-MINERALS) tablet Take 1 tablet by mouth daily 30 tablet 5    levothyroxine (SYNTHROID) 88 MCG tablet Take 1 tablet by mouth daily 1 tablet daily 30 tablet 5    vitamin D (CHOLECALCIFEROL) 1000 UNIT TABS tablet Take 1,000 Units by mouth daily      metFORMIN (GLUCOPHAGE) 1000 MG tablet Take 1,000 mg by mouth 2 times daily (with meals)       losartan (COZAAR) 100 MG tablet Take 100 mg by mouth daily      aspirin 81 MG chewable tablet Take 81 mg by mouth daily      Insulin NPH Isophane & Regular (HUMULIN;NOVOLIN) (70-30) 100 UNIT/ML injection pen Inject 30 Units into the skin three times daily SLIDING SCALE         Social History:    Social History     Socioeconomic History    Marital status:      Spouse name: Not on file    Number of children: Not on file    Years of education: Not on file    Highest education level: Not on file   Occupational History    Not on file   Tobacco Use    Smoking status: Former Smoker     Packs/day: 0.50     Years: 9.00     Pack years: 4.50     Types: Pipe     Start date: 1974     Quit date: 1983     Years since quittin.6    Smokeless tobacco: Never Used   Substance and Sexual Activity    Alcohol use:  Yes    Drug use: No    Sexual activity: Not on file   Other Topics Concern    Not on file   Social History Narrative    Not on file     Social Determinants of Health     Financial Resource Strain:     Difficulty of Paying Living Expenses: Not on file   Food Insecurity:     Worried About Running Out of Food in the Last Year: Not on file    Mu of Food in the Last Year: Not on file   Transportation Needs:     Lack of Transportation (Medical): Not on file    Lack of Transportation (Non-Medical): Not on file   Physical Activity:     Days of Exercise per Week: Not on file    Minutes of Exercise per Session: Not on file   Stress:     Feeling of Stress : Not on file   Social Connections:     Frequency of Communication with Friends and Family: Not on file    Frequency of Social Gatherings with Friends and Family: Not on file    Attends Faith Services: Not on file    Active Member of [address] [place] [address] [employer] or Organizations: Not on file    Attends Club or Organization Meetings: Not on file    Marital Status: Not on file   Intimate Partner Violence:     Fear of Current or Ex-Partner: Not on file    Emotionally Abused: Not on file    Physically Abused: Not on file    Sexually Abused: Not on file   Housing Stability:     Unable to Pay for Housing in the Last Year: Not on file    Number of [place] in the Last Year: Not on file    Unstable Housing in the Last Year: Not on file       Family History:  No family history on file. Review of Systems:      · Constitutional: no weight loss or gain, no fever or chills. · Eyes: No new visual changes. · ENT: No new hearing loss. · Cardiovascular: see HPI. · Respiratory: No cough. No hemoptysis. · Gastrointestinal: No abdominal pain, no blood in stools. · Genitourinary: No hematuria or increased frequency. · Musculoskeletal:  No new gait disturbance. · Integumentary: No rash or pruritis. · Neurological: No headache. No seizures or recent CVA/TIA. · Psychiatric: No anxiety or depression. · Hematologic/Lymphatic: No abnormal bruising or bleeding. · Allergic/Immunologic: No new allergies.        Physical Exam Vital Signs: BP (!) 111/58   Pulse 67   Temp 98.4 °F (36.9 °C) (Oral)   Resp 15   Ht 5' 10\" (1.778 m)   Wt 189 lb (85.7 kg)   SpO2 96%   BMI 27.12 kg/m²        Admission Weight: 189 lb (85.7 kg)     General appearance: Awake, Alert, well developed, well nourished, pleasant. Cooperative. Resting comfortably in bed. Head: Normocephalic, atraumatic. Eyes:   Conjunctiva clear. Neck: no JVD, no carotid bruits, no thyromegaly. Chest: Clear bilaterally. No chest wall tenderness. No wheezing. Cardiac: Regular rate and rhythm, no S3, +S4 gallop, no significant murmur or rubs or clicks. Abdomen: Soft, non-tender. Extremities: no cyanosis or clubbing or leg edema. No calf tenderness. Pulses:  Bilaterally symmetrical and intact radial and femoral pulses. Neurologic:  Able to move all 4 extremities. Skin:  No rashes. Warm and dry. EKG 5/5/2022:     Normal sinus rhythm  Normal ECG  When compared with ECG of 05-MAY-2022 21:08, (unconfirmed)  No significant change was found    CARDIAC CATHETERIZATION Oct 2019:    Procedure Summary      Multivessel CAd. Patent SVG to LAD and SVG-RCA. LIMA-SELIN apparently not used for conduit. Overall Normal LV systolic function. Successful PTCA/HERACLIO Cx      Recommendations      Medical therapy as needed. Post PCI care.       Labs:      CBC:   Recent Labs     05/05/22 2050   WBC 6.5   HGB 13.6   HCT 40.1*   MCV 92.1        BMP:   Recent Labs     05/05/22 2050      K 4.3      CO2 24   BUN 19   CREATININE 1.31*       Recent Results (from the past 24 hour(s))   CBC with Auto Differential    Collection Time: 05/05/22  8:50 PM   Result Value Ref Range    WBC 6.5 3.5 - 11.0 k/uL    RBC 4.35 (L) 4.5 - 5.9 m/uL    Hemoglobin 13.6 13.5 - 17.5 g/dL    Hematocrit 40.1 (L) 41 - 53 %    MCV 92.1 80 - 100 fL    MCH 31.3 26 - 34 pg    MCHC 34.0 31 - 37 g/dL    RDW 13.9 11.5 - 14.9 %    Platelets 797 352 - 477 k/uL    MPV 7.6 6.0 - 12.0 fL Seg Neutrophils 57 36 - 66 %    Lymphocytes 30 24 - 44 %    Monocytes 8 (H) 1 - 7 %    Eosinophils % 4 0 - 4 %    Basophils 1 0 - 2 %    Segs Absolute 3.80 1.3 - 9.1 k/uL    Absolute Lymph # 1.90 1.0 - 4.8 k/uL    Absolute Mono # 0.50 0.1 - 1.3 k/uL    Absolute Eos # 0.30 0.0 - 0.4 k/uL    Basophils Absolute 0.10 0.0 - 0.2 k/uL   Comprehensive Metabolic Panel w/ Reflex to MG    Collection Time: 05/05/22  8:50 PM   Result Value Ref Range    Glucose 267 (H) 70 - 99 mg/dL    BUN 19 8 - 23 mg/dL    CREATININE 1.31 (H) 0.70 - 1.20 mg/dL    Calcium 9.5 8.6 - 10.4 mg/dL    Sodium 138 135 - 144 mmol/L    Potassium 4.3 3.7 - 5.3 mmol/L    Chloride 103 98 - 107 mmol/L    CO2 24 20 - 31 mmol/L    Anion Gap 11 9 - 17 mmol/L    Alkaline Phosphatase 65 40 - 129 U/L    ALT 19 5 - 41 U/L    AST 22 <40 U/L    Total Bilirubin 0.58 0.3 - 1.2 mg/dL    Total Protein 7.2 6.4 - 8.3 g/dL    Albumin 3.9 3.5 - 5.2 g/dL    GFR Non- 53 (L) >60 mL/min    GFR African American >60 >60 mL/min    GFR Comment         Lipase    Collection Time: 05/05/22  8:50 PM   Result Value Ref Range    Lipase 35 13 - 60 U/L   Troponin    Collection Time: 05/05/22  8:50 PM   Result Value Ref Range    Troponin, High Sensitivity 17 0 - 22 ng/L   EKG 12 Lead    Collection Time: 05/05/22  9:08 PM   Result Value Ref Range    Ventricular Rate 71 BPM    Atrial Rate 71 BPM    P-R Interval 192 ms    QRS Duration 80 ms    Q-T Interval 376 ms    QTc Calculation (Bazett) 408 ms    P Axis 49 degrees    R Axis -4 degrees    T Axis 94 degrees   Troponin    Collection Time: 05/05/22 10:25 PM   Result Value Ref Range    Troponin, High Sensitivity 18 0 - 22 ng/L   EKG 12 Lead    Collection Time: 05/05/22 11:01 PM   Result Value Ref Range    Ventricular Rate 64 BPM    Atrial Rate 64 BPM    P-R Interval 180 ms    QRS Duration 82 ms    Q-T Interval 412 ms    QTc Calculation (Bazett) 425 ms    P Axis 47 degrees    R Axis -14 degrees    T Axis 41 degrees   EKG 12 Lead Collection Time: 05/05/22 11:46 PM   Result Value Ref Range    Ventricular Rate 0 BPM    Atrial Rate 0 BPM    QRS Duration 0 ms    Q-T Interval 0 ms    QTc Calculation (Bazett) 0 ms    R Axis 0 degrees    T Axis 0 degrees   Troponin    Collection Time: 05/06/22  3:20 AM   Result Value Ref Range    Troponin, High Sensitivity 31 (H) 0 - 22 ng/L   Anti-Xa, Unfractionated Heparin    Collection Time: 05/06/22  5:29 AM   Result Value Ref Range    Anti-XA Unfrac Heparin 0.47 0.30 - 0.70 IU/L         2 D ECHOCARDIOGRAM 2019:    Normal left ventricle size, wall thickness and function with an estimated EF  > 55%. No segmental wall motion abnormalities seen. Grade I (mild) left ventricular diastolic dysfunction. Normal right ventricular size and function. Aortic valve is trileaflet. No aortic stenosis. Trivial aortic insufficiency. Normal aortic root dimension. Mild mitral regurgitation. Mild tricuspid regurgitation. Normal right ventricular systolic pressure. No significant pericardial effusion is seen. IMPRESSION:    Unstable angina. No acute EKG changes. Symptoms similar to his prior angina prior to CABG and prior stent of left circumflex artery. Suspect severe underlying coronary artery disease. ASCAD, status post CABG x2 with LIMA to LAD, SVG to RCA and status post stent of circumflex artery in 2019. Essential hypertension. Hyperlipidemia. Other problems as charted. REC/PLAN:      As ordered. Aspirin, IV heparin, statin, low-dose beta-blocker 12.5 mg b.i.d. with holding parameters, Imdur 30 mg daily with holding parameters, IV fluids, oxygen as needed, supportive care, analgesics p.r.n. .      Nurse told me that patient is blood pressure is labile and on IV fluids. Discussed with patient at bedside in the presence of patient's nurse regarding options.       Given presentation and high suspicion for underlying CAD, advised to proceed on with diagnostic cardiac catheterization with grafts angiography with or without percutaneous or surgical revascularization to be performed at [facility].  Risks, benefits and alternatives of the procedures involved were discussed in detail with the patient who voices understanding. Our office will arrange for transfer to [facility] and for cardiac catheterization to be performed today and further management accordingly. There were no family members available at bedside to discuss. Discussed with the nurse at bedside. Thank you. Electronically signed by Laura Blake MD, [address] [facility]      PLEASE NOTE:  This progress note was completed using a voice transcription system. Every effort was made to ensure accuracy. However, inadvertent computerized transcription errors may be present.

## 2022-05-06 NOTE — ED NOTES
Attempted to call report to cath lab x2, no answer, called house supervisor at [facility] no answer.       Leela Rosario RN  05/06/22 [id number]

## 2022-05-07 VITALS
OXYGEN SATURATION: 97 % | DIASTOLIC BLOOD PRESSURE: 48 MMHG | BODY MASS INDEX: 26.05 KG/M2 | WEIGHT: 182 LBS | HEART RATE: 55 BPM | TEMPERATURE: 98 F | HEIGHT: 70 IN | RESPIRATION RATE: 26 BRPM | SYSTOLIC BLOOD PRESSURE: 147 MMHG

## 2022-05-07 LAB
ANION GAP SERPL CALCULATED.3IONS-SCNC: 9 MMOL/L (ref 9–17)
BUN BLDV-MCNC: 12 MG/DL (ref 8–23)
BUN/CREAT BLD: 12 (ref 9–20)
CALCIUM SERPL-MCNC: 8.3 MG/DL (ref 8.6–10.4)
CHLORIDE BLD-SCNC: 103 MMOL/L (ref 98–107)
CO2: 25 MMOL/L (ref 20–31)
CREAT SERPL-MCNC: 1.04 MG/DL (ref 0.7–1.2)
GFR AFRICAN AMERICAN: >60 ML/MIN
GFR NON-AFRICAN AMERICAN: >60 ML/MIN
GFR SERPL CREATININE-BSD FRML MDRD: ABNORMAL ML/MIN/{1.73_M2}
GLUCOSE BLD-MCNC: 197 MG/DL (ref 70–99)
GLUCOSE BLD-MCNC: 222 MG/DL (ref 75–110)
GLUCOSE BLD-MCNC: 240 MG/DL (ref 75–110)
HCT VFR BLD CALC: 35.4 % (ref 40.7–50.3)
HEMOGLOBIN: 11.6 G/DL (ref 13–17)
MCH RBC QN AUTO: 31.1 PG (ref 25.2–33.5)
MCHC RBC AUTO-ENTMCNC: 32.8 G/DL (ref 28.4–34.8)
MCV RBC AUTO: 94.9 FL (ref 82.6–102.9)
NRBC AUTOMATED: 0 PER 100 WBC
PDW BLD-RTO: 13.4 % (ref 11.8–14.4)
PLATELET # BLD: 163 K/UL (ref 138–453)
PMV BLD AUTO: 9.7 FL (ref 8.1–13.5)
POTASSIUM SERPL-SCNC: 4.5 MMOL/L (ref 3.7–5.3)
RBC # BLD: 3.73 M/UL (ref 4.21–5.77)
SODIUM BLD-SCNC: 137 MMOL/L (ref 135–144)
WBC # BLD: 6.8 K/UL (ref 3.5–11.3)

## 2022-05-07 PROCEDURE — 2580000003 HC RX 258: Performed by: INTERNAL MEDICINE

## 2022-05-07 PROCEDURE — 80048 BASIC METABOLIC PNL TOTAL CA: CPT

## 2022-05-07 PROCEDURE — 85027 COMPLETE CBC AUTOMATED: CPT

## 2022-05-07 PROCEDURE — 82947 ASSAY GLUCOSE BLOOD QUANT: CPT

## 2022-05-07 PROCEDURE — 6370000000 HC RX 637 (ALT 250 FOR IP): Performed by: INTERNAL MEDICINE

## 2022-05-07 PROCEDURE — G0378 HOSPITAL OBSERVATION PER HR: HCPCS

## 2022-05-07 PROCEDURE — 36415 COLL VENOUS BLD VENIPUNCTURE: CPT

## 2022-05-07 RX ORDER — METOPROLOL SUCCINATE 25 MG/1
25 TABLET, EXTENDED RELEASE ORAL DAILY
Qty: 30 TABLET | Refills: 11 | Status: SHIPPED | OUTPATIENT
Start: 2022-05-08 | End: 2022-05-19

## 2022-05-07 RX ADMIN — METOPROLOL SUCCINATE 25 MG: 25 TABLET, EXTENDED RELEASE ORAL at 10:05

## 2022-05-07 RX ADMIN — TICAGRELOR 90 MG: 90 TABLET ORAL at 10:06

## 2022-05-07 RX ADMIN — SODIUM CHLORIDE, PRESERVATIVE FREE 10 ML: 5 INJECTION INTRAVENOUS at 10:10

## 2022-05-07 RX ADMIN — LOSARTAN POTASSIUM 100 MG: 100 TABLET, FILM COATED ORAL at 10:06

## 2022-05-07 RX ADMIN — LEVOTHYROXINE SODIUM 88 MCG: 0.09 TABLET ORAL at 07:12

## 2022-05-07 RX ADMIN — MULTIPLE VITAMINS W/ MINERALS TAB 1 TABLET: TAB at 10:07

## 2022-05-07 RX ADMIN — Medication 1000 UNITS: at 10:07

## 2022-05-07 RX ADMIN — SODIUM CHLORIDE, PRESERVATIVE FREE 10 ML: 5 INJECTION INTRAVENOUS at 10:11

## 2022-05-07 RX ADMIN — MAGNESIUM OXIDE TAB 400 MG (241.3 MG ELEMENTAL MG) 400 MG: 400 (241.3 MG) TAB at 10:07

## 2022-05-07 RX ADMIN — ASPIRIN 81 MG 81 MG: 81 TABLET ORAL at 10:06

## 2022-05-07 NOTE — DISCHARGE SUMMARY
[address], [address] [facility]  [phone]          Progress Note    Patient Name:  Shy Vivas    :  1940 12:28 PM      SUBJECTIVE       Mr. David Larry  has no chest pain, shortness of breath, palpitations, nausea or vomiting      OBJECTIVE     Vital signs:    BP (!) 147/48   Pulse 55   Temp 98 °F (36.7 °C) (Temporal)   Resp 26   Ht 5' 10\" (1.778 m)   Wt 182 lb (82.6 kg)   SpO2 97%   BMI 26.11 kg/m²     . tro    Admit Weight:  182 lb (82.6 kg)    Last 3 weights: Wt Readings from Last 3 Encounters:   22 182 lb (82.6 kg)   22 189 lb (85.7 kg)   21 142 lb (64.4 kg)       BMI: Body mass index is 26.11 kg/m². Input/Output:       Intake/Output Summary (Last 24 hours) at 2022 1228  Last data filed at 2022 0834  Gross per 24 hour   Intake 220 ml   Output 2075 ml   Net -1855 ml       Date 22 0000 - 22 2359   Shift 5790-3784 7997-7745 3739-3243 24 Hour Total   INTAKE   P.O.(mL/kg/hr) 220(0.3)   220   Shift Total(mL/kg) 220(2.7)   220(2.7)   OUTPUT   Urine(mL/kg/hr) 1125(1.7) 200  1325   Shift Total(mL/kg) 1125(13.6) 200(2.4)  1325(16.1)   Weight (kg) 82.6 82.6 82.6 82.6     Exam:     General appearance: awake and alert moves all ext   Lungs: no rhonchi, no wheezes, no rales  Heart: S1 and S2 no murmur  Abdomen: positive bowel sounds, no bruits, no masses  Extremities: warm and dry, no cyanosis, no clubbing        Laboratory Studies:     CBC:   Recent Labs     22  0318   WBC 6.5 6.8   HGB 13.6 11.6*   HCT 40.1* 35.4*   MCV 92.1 94.9    163     BMP:   Recent Labs     22  0318    137   K 4.3 4.5    103   CO2 24 25   BUN 19 12   CREATININE 1.31* 1.04     PT/INR: No results for input(s): PROTIME, INR in the last 72 hours. APTT: No results for input(s): APTT in the last 72 hours. MAG: No results for input(s): MG in the last 72 hours.   D Dimer: No results for input(s): DDIMER in the last 72 hours. Troponin    Recent Labs     22  0320 22  1734 22  2008   TROPHS 31* 19 21                BNP No results for input(s): BNP in the last 72 hours. No results for input(s): PROBNP in the last 72 hours. Pulse Ox: SpO2  Av.9 %  Min: 93 %  Max: 98 %  Supplemental O2:       Current Meds:    sodium chloride flush  5-40 mL IntraVENous 2 times per day    ticagrelor  90 mg Oral BID    Insulin NPH Isophane & Regular  30 Units SubCUTAneous TID    levothyroxine  88 mcg Oral Daily    losartan  100 mg Oral Daily    magnesium oxide  400 mg Oral Daily    therapeutic multivitamin-minerals  1 tablet Oral Daily    rosuvastatin  20 mg Oral Daily    Vitamin D  1,000 Units Oral Daily    metoprolol succinate  25 mg Oral Daily    sodium chloride flush  5-40 mL IntraVENous 2 times per day    aspirin  81 mg Oral Daily     Continuous Infusions:    sodium chloride 75 mL/hr at 22    sodium chloride      dextrose      sodium chloride           XR CHEST PORTABLE    Result Date: 2022  Marginal inspiration, without evidence of acute cardiopulmonary disease       Echo:      ASSESSMENT     Principal Problem:    Unstable angina (HCC)  Resolved Problems:    * No resolved hospital problems.  *      PLAN       S/p pci of [facility] for d/c    Electronically signed by Cecilia Reed MD on 2022 at 12:28 PM

## 2022-05-07 NOTE — PROGRESS NOTES
Pt returned to [address] [facility] from cath lab. Pt set up on monitor. Vitals signs stable. Pt denies pain and doesn't appear in distress. Right groin clean, dry and intact with a gauze and tegaderm dressing. Family at bedside. Call light within reach. Side rails X2. No other issues or concerns. Will continue to monitor.

## 2022-05-07 NOTE — PLAN OF CARE
Problem: Chronic Conditions and Co-morbidities  Goal: Patient's chronic conditions and co-morbidity symptoms are monitored and maintained or improved  Outcome: Progressing  Care Plan - Patient's Chronic Conditions and Co-Morbidity Symptoms are Monitored and Maintained or Improved:   Monitor and assess patient's chronic conditions and comorbid symptoms for stability, deterioration, or improvement   Collaborate with multidisciplinary team to address chronic and comorbid conditions and prevent exacerbation or deterioration   Update acute care plan with appropriate goals if chronic or comorbid symptoms are exacerbated and prevent overall improvement and discharge    Problem: Discharge Planning  Goal: Discharge to home or other facility with appropriate resources  Outcome: Progressing  Discharge to home or other facility with appropriate resources:   Identify barriers to discharge with patient and caregiver   Identify discharge learning needs (meds, wound care, etc)       Problem: Safety - Adult  Goal: Free from fall injury  Outcome: Progressing  Free From Fall Injury: Instruct family/caregiver on patient safety       Problem: Skin/Tissue Integrity  Goal: Absence of new skin breakdown  Outcome: Progressing  Description: 1. Monitor for areas of redness and/or skin breakdown  2. Assess vascular access sites hourly  3. Every 4-6 hours minimum:  Change oxygen saturation probe site  4. Every 4-6 hours:  If on nasal continuous positive airway pressure, respiratory therapy assess nares and determine need for appliance change or resting period.

## 2022-05-07 NOTE — PLAN OF CARE
Problem: Chronic Conditions and Co-morbidities  Goal: Patient's chronic conditions and co-morbidity symptoms are monitored and maintained or improved  Outcome: Completed  Flowsheets (Taken 5/7/2022 0800)  Care Plan - Patient's Chronic Conditions and Co-Morbidity Symptoms are Monitored and Maintained or Improved: Monitor and assess patient's chronic conditions and comorbid symptoms for stability, deterioration, or improvement     Problem: Discharge Planning  Goal: Discharge to home or other facility with appropriate resources  Outcome: Completed  Flowsheets (Taken 5/7/2022 0800)  Discharge to home or other facility with appropriate resources: Identify barriers to discharge with patient and caregiver     Problem: Safety - Adult  Goal: Free from fall injury  Outcome: Completed     Problem: Skin/Tissue Integrity  Goal: Absence of new skin breakdown  Description: 1. Monitor for areas of redness and/or skin breakdown  2. Assess vascular access sites hourly  3. Every 4-6 hours minimum:  Change oxygen saturation probe site  4. Every 4-6 hours:  If on nasal continuous positive airway pressure, respiratory therapy assess nares and determine need for appliance change or resting period.   Outcome: Completed

## 2022-05-08 LAB
ESTIMATED AVERAGE GLUCOSE: 240 MG/DL
HBA1C MFR BLD: 10 % (ref 4–6)

## 2022-05-09 ENCOUNTER — CARE COORDINATION (OUTPATIENT)
Dept: CASE MANAGEMENT | Age: 82
End: 2022-05-09

## 2022-05-09 DIAGNOSIS — I20.0 UNSTABLE ANGINA (HCC): Primary | ICD-10-CM

## 2022-05-09 PROCEDURE — 1111F DSCHRG MED/CURRENT MED MERGE: CPT | Performed by: FAMILY MEDICINE

## 2022-05-09 NOTE — CARE COORDINATION
[facility] Transitions Initial Follow Up Call    Call within 2 business days of discharge: Yes    Patient: Ion Mcdonald Patient : 1940    MRN: [id number]  Reason for Admission: Unstable angina     Discharge Date: 22 RARS: Readmission Risk Score: 11.3 ( )      Last Discharge [facility]       Complaint Diagnosis Description Type Department Provider    22   Admission (Discharged) Sheela Eugene MD           Spoke with: Rachael Vasques he states he is doing fine. Denies chest pain, SOB, dizziness, numbness, nausea. Is eating and drinking well denies Leg swelling groin site clean dry intact looks \"fine\" medications reviewed and taking as directed. Normal bowel and bladder elimination. 1111 f completed. Has no concerns declines assistance scheduling f/u appointments. Is agreeable to future calls. Facility: [facility]    Non-face-to-face services provided:  Obtained and reviewed discharge summary and/or continuity of care documents  Transitions of Care Initial Call    Was this an external facility discharge? No     Challenges to be reviewed by the provider   Additional needs identified to be addressed with provider: No  none             Method of communication with provider : none    Advance Care Planning:   Does patient have an Advance Directive: reviewed and current, reviewed and needs to be updated, not on file; education provided, patient declined education, decision maker updated and referral to internal ACP facilitator. Care Transition Nurse (CTN) contacted the patient by telephone to perform post hospital discharge assessment. Verified name and  with patient as identifiers. Provided introduction to self, and explanation of the CTN role. CTN reviewed discharge instructions, medical action plan and red flags with patient who verbalized understanding. Patient given an opportunity to ask questions and does not have any further questions or concerns at this time.  Were discharge instructions available to patient? Yes. Reviewed appropriate site of care based on symptoms and resources available to patient including: PCP  Specialist. The patient agrees to contact the PCP office for questions related to their healthcare. Medication reconciliation was performed with patient, who verbalizes understanding of administration of home medications. Advised obtaining a 90-day supply of all daily and as-needed medications. Was patient discharged with a pulse oximeter? no    LPN CC provided contact information. Plan for follow-up call in 3-5 days based on severity of symptoms and risk factors.   Plan for next call: follow up appointment-Dr Nick Ny, Dr Remberto Chaudhary 24 Hour Call    Schedule Follow Up Appointment with PCP: Yuval Sands you have a copy of your discharge instructions?: Yes  Do you have all of your prescriptions and are they filled?: Yes  Have you been contacted by a [id number] [employer] Pharmacist?: No  Have you scheduled your follow up appointment?: No  Do you feel like you have everything you need to keep you well at home?: Yes  Care Transitions Interventions         Follow Up  Future Appointments   Date Time Provider Annalee Larson   6/23/2022  2:00 PM Orlene Hatchet, MD [address] Artie Singh LPN

## 2022-05-12 ENCOUNTER — CARE COORDINATION (OUTPATIENT)
Dept: CASE MANAGEMENT | Age: 82
End: 2022-05-12

## 2022-05-12 NOTE — CARE COORDINATION
[facility] Transitions Follow Up Call    2022    Patient: Sarah King  Patient : 1940    MRN: [id number]  Reason for Admission: Unstable angina     Discharge Date: 22 RARS: Readmission Risk Score: 11.3 ( )         Spoke with: Gisela Pickering he states he is doing okay. Denies chest pain, SOB< dizziness , nausea, he is eating and drinking well groin site looks good no longer swelled. He has had high BG levels today was 180. CTN advised he reach out to PCP office to let them know reading have been high to make sure he is taking the correct amount of insulin. He V/U. No other concerns noted. Care Transitions Follow Up Call    Needs to be reviewed by the provider   Additional needs identified to be addressed with provider: No  none             Method of communication with provider : none      Care Transition Nurse (CTN) contacted the patient by telephone to follow up after admission on . Verified name and  with patient as identifiers. Addressed changes since last contact: none  Discussed follow-up appointments. If no appointment was previously scheduled, appointment scheduling offered: Yes. Is follow up appointment scheduled within 7 days of discharge? Yes. Advance Care Planning:   Does patient have an Advance Directive: reviewed and current, reviewed and needs to be updated, not on file; education provided, patient declined education, decision maker updated and referral to internal ACP facilitator. CTN reviewed discharge instructions, medical action plan and red flags with patient and discussed any barriers to care and/or understanding of plan of care after discharge. Discussed appropriate site of care based on symptoms and resources available to patient including: PCP  Specialist. The patient agrees to contact the PCP office for questions related to their healthcare.      Patients top risk factors for readmission: lack of knowledge about disease  Interventions to address risk factors: Obtained and reviewed discharge summary and/or continuity of care documents          CTN provided contact information for future needs. Plan for follow-up call in 5-7 days based on severity of symptoms and risk factors. Plan for next call: symptom management-BG levels           Care Transitions Subsequent and Final Call    Subsequent and Final Calls  Do you have any ongoing symptoms?: Yes  Onset of Patient-reported symptoms: Other  Patient-reported symptoms: Other  Interventions for patient-reported symptoms: Other  Have your medications changed?: No  Do you have any questions related to your medications?: No  Do you currently have any active services?: No  Do you have any needs or concerns that I can assist you with?: No  Identified Barriers: Other  Care Transitions Interventions  Other Interventions:            Follow Up  Future Appointments   Date Time Provider Annalee Larson   5/19/2022  2:10 PM MD Anayeli Dueñas  [id number]   6/8/2022 12:00  [facility]  [address]   6/23/2022  2:00 PM MD Anayeli Dueñas  Travis Gonzalez LPN

## 2022-05-19 ENCOUNTER — CARE COORDINATION (OUTPATIENT)
Dept: CASE MANAGEMENT | Age: 82
End: 2022-05-19

## 2022-05-20 ENCOUNTER — CARE COORDINATION (OUTPATIENT)
Dept: CASE MANAGEMENT | Age: 82
End: 2022-05-20

## 2022-05-20 NOTE — CARE COORDINATION
[facility] Transitions Follow Up Call    2022    Patient: Evans Staley  Patient : 1940    MRN: [id number]  Reason for Admission: Unstable angina     Discharge Date: 22 RARS: Readmission Risk Score: 11.3 ( )         Spoke with: Called to speak with patient for update with transition of care. Left HIPPA compliant voice message with contact information [phone] for a call  Back with an update. Patient noted to have f/u visit yesterday with Brie Honeycutt PCP  Care Transitions Subsequent and Final Call    Subsequent and Final Calls  Care Transitions Interventions  Other Interventions:            Follow Up  Future Appointments   Date Time Provider Annalee Larson   2022 12:00 PM [facility] RM [facility] CARDIAC [address]   10/26/2022  1:40 PM Jessica Hernandez MD [facility] Prince Bullock LPN

## 2022-05-24 ENCOUNTER — HOSPITAL ENCOUNTER (INPATIENT)
Age: 82
LOS: 1 days | Discharge: HOME | DRG: 313 | End: 2022-05-25
Attending: EMERGENCY MEDICINE | Admitting: INTERNAL MEDICINE
Payer: MEDICARE

## 2022-05-24 ENCOUNTER — APPOINTMENT (OUTPATIENT)
Dept: GENERAL RADIOLOGY | Age: 82
DRG: 313 | End: 2022-05-24
Payer: MEDICARE

## 2022-05-24 DIAGNOSIS — R07.9 CHEST PAIN, UNSPECIFIED TYPE: Primary | ICD-10-CM

## 2022-05-24 LAB
ABSOLUTE EOS #: 0.32 K/UL (ref 0–0.44)
ABSOLUTE IMMATURE GRANULOCYTE: 0.02 K/UL (ref 0–0.3)
ABSOLUTE LYMPH #: 1.05 K/UL (ref 1.1–3.7)
ABSOLUTE MONO #: 0.51 K/UL (ref 0.1–1.2)
ALBUMIN SERPL-MCNC: 4.3 G/DL (ref 3.5–5.2)
ALP BLD-CCNC: 64 U/L (ref 40–129)
ALT SERPL-CCNC: 27 U/L (ref 5–41)
ANION GAP SERPL CALCULATED.3IONS-SCNC: 11 MMOL/L (ref 9–17)
AST SERPL-CCNC: 21 U/L
BASOPHILS # BLD: 1 % (ref 0–2)
BASOPHILS ABSOLUTE: 0.03 K/UL (ref 0–0.2)
BILIRUB SERPL-MCNC: 1.01 MG/DL (ref 0.3–1.2)
BUN BLDV-MCNC: 18 MG/DL (ref 8–23)
BUN/CREAT BLD: 17 (ref 9–20)
CALCIUM SERPL-MCNC: 9.2 MG/DL (ref 8.6–10.4)
CHLORIDE BLD-SCNC: 103 MMOL/L (ref 98–107)
CO2: 22 MMOL/L (ref 20–31)
CREAT SERPL-MCNC: 1.04 MG/DL (ref 0.7–1.2)
EOSINOPHILS RELATIVE PERCENT: 5 % (ref 1–4)
GFR AFRICAN AMERICAN: >60 ML/MIN
GFR NON-AFRICAN AMERICAN: >60 ML/MIN
GFR SERPL CREATININE-BSD FRML MDRD: ABNORMAL ML/MIN/{1.73_M2}
GLUCOSE BLD-MCNC: 240 MG/DL (ref 75–110)
GLUCOSE BLD-MCNC: 310 MG/DL (ref 70–99)
HCT VFR BLD CALC: 41.2 % (ref 40.7–50.3)
HEMOGLOBIN: 13.4 G/DL (ref 13–17)
IMMATURE GRANULOCYTES: 0 %
INR BLD: 1.1
LIPASE: 41 U/L (ref 13–60)
LYMPHOCYTES # BLD: 16 % (ref 24–43)
MCH RBC QN AUTO: 31 PG (ref 25.2–33.5)
MCHC RBC AUTO-ENTMCNC: 32.5 G/DL (ref 28.4–34.8)
MCV RBC AUTO: 95.4 FL (ref 82.6–102.9)
MONOCYTES # BLD: 8 % (ref 3–12)
NRBC AUTOMATED: 0 PER 100 WBC
PARTIAL THROMBOPLASTIN TIME: 32.2 SEC (ref 23.9–33.8)
PDW BLD-RTO: 12.8 % (ref 11.8–14.4)
PLATELET # BLD: 191 K/UL (ref 138–453)
PMV BLD AUTO: 9.7 FL (ref 8.1–13.5)
POTASSIUM SERPL-SCNC: 4.7 MMOL/L (ref 3.7–5.3)
PRO-BNP: 956 PG/ML
PROTHROMBIN TIME: 14.2 SEC (ref 11.5–14.2)
RBC # BLD: 4.32 M/UL (ref 4.21–5.77)
REASON FOR REJECTION: NORMAL
SEG NEUTROPHILS: 70 % (ref 36–65)
SEGMENTED NEUTROPHILS ABSOLUTE COUNT: 4.53 K/UL (ref 1.5–8.1)
SODIUM BLD-SCNC: 136 MMOL/L (ref 135–144)
TOTAL PROTEIN: 7.4 G/DL (ref 6.4–8.3)
TROPONIN, HIGH SENSITIVITY: 17 NG/L (ref 0–22)
TROPONIN, HIGH SENSITIVITY: 18 NG/L (ref 0–22)
WBC # BLD: 6.5 K/UL (ref 3.5–11.3)
ZZ NTE CLEAN UP: ORDERED TEST: NORMAL
ZZ NTE WITH NAME CLEAN UP: SPECIMEN SOURCE: NORMAL

## 2022-05-24 PROCEDURE — 2580000003 HC RX 258: Performed by: EMERGENCY MEDICINE

## 2022-05-24 PROCEDURE — G0378 HOSPITAL OBSERVATION PER HR: HCPCS

## 2022-05-24 PROCEDURE — 85025 COMPLETE CBC W/AUTO DIFF WBC: CPT

## 2022-05-24 PROCEDURE — 85610 PROTHROMBIN TIME: CPT

## 2022-05-24 PROCEDURE — 84484 ASSAY OF TROPONIN QUANT: CPT

## 2022-05-24 PROCEDURE — 85730 THROMBOPLASTIN TIME PARTIAL: CPT

## 2022-05-24 PROCEDURE — 1200000000 HC SEMI PRIVATE

## 2022-05-24 PROCEDURE — 71045 X-RAY EXAM CHEST 1 VIEW: CPT

## 2022-05-24 PROCEDURE — 6370000000 HC RX 637 (ALT 250 FOR IP): Performed by: EMERGENCY MEDICINE

## 2022-05-24 PROCEDURE — 83690 ASSAY OF LIPASE: CPT

## 2022-05-24 PROCEDURE — 80053 COMPREHEN METABOLIC PANEL: CPT

## 2022-05-24 PROCEDURE — 99285 EMERGENCY DEPT VISIT HI MDM: CPT

## 2022-05-24 PROCEDURE — 36415 COLL VENOUS BLD VENIPUNCTURE: CPT

## 2022-05-24 PROCEDURE — 83880 ASSAY OF NATRIURETIC PEPTIDE: CPT

## 2022-05-24 PROCEDURE — 82947 ASSAY GLUCOSE BLOOD QUANT: CPT

## 2022-05-24 PROCEDURE — 93005 ELECTROCARDIOGRAM TRACING: CPT | Performed by: EMERGENCY MEDICINE

## 2022-05-24 RX ORDER — HEPARIN SODIUM 1000 [USP'U]/ML
4000 INJECTION, SOLUTION INTRAVENOUS; SUBCUTANEOUS PRN
Status: DISCONTINUED | OUTPATIENT
Start: 2022-05-24 | End: 2022-05-25

## 2022-05-24 RX ORDER — HEPARIN SODIUM 10000 [USP'U]/100ML
5-30 INJECTION, SOLUTION INTRAVENOUS CONTINUOUS
Status: DISCONTINUED | OUTPATIENT
Start: 2022-05-24 | End: 2022-05-25

## 2022-05-24 RX ORDER — HEPARIN SODIUM 1000 [USP'U]/ML
2000 INJECTION, SOLUTION INTRAVENOUS; SUBCUTANEOUS PRN
Status: DISCONTINUED | OUTPATIENT
Start: 2022-05-24 | End: 2022-05-25

## 2022-05-24 RX ORDER — HEPARIN SODIUM 1000 [USP'U]/ML
4000 INJECTION, SOLUTION INTRAVENOUS; SUBCUTANEOUS ONCE
Status: COMPLETED | OUTPATIENT
Start: 2022-05-24 | End: 2022-05-25

## 2022-05-24 RX ORDER — 0.9 % SODIUM CHLORIDE 0.9 %
1000 INTRAVENOUS SOLUTION INTRAVENOUS ONCE
Status: COMPLETED | OUTPATIENT
Start: 2022-05-24 | End: 2022-05-24

## 2022-05-24 RX ORDER — ASPIRIN 81 MG/1
243 TABLET, CHEWABLE ORAL ONCE
Status: COMPLETED | OUTPATIENT
Start: 2022-05-24 | End: 2022-05-24

## 2022-05-24 RX ADMIN — ASPIRIN 81 MG CHEWABLE TABLET 243 MG: 81 TABLET CHEWABLE at 22:03

## 2022-05-24 RX ADMIN — SODIUM CHLORIDE 1000 ML: 9 INJECTION, SOLUTION INTRAVENOUS at 22:40

## 2022-05-24 RX ADMIN — METFORMIN HYDROCHLORIDE 1000 MG: 500 TABLET ORAL at 22:43

## 2022-05-24 ASSESSMENT — PAIN - FUNCTIONAL ASSESSMENT: PAIN_FUNCTIONAL_ASSESSMENT: 0-10

## 2022-05-24 ASSESSMENT — PAIN SCALES - GENERAL: PAINLEVEL_OUTOF10: 3

## 2022-05-24 ASSESSMENT — ENCOUNTER SYMPTOMS
NAUSEA: 0
SHORTNESS OF BREATH: 0
ABDOMINAL PAIN: 0
VOMITING: 0

## 2022-05-24 NOTE — ED NOTES
Pt presents to the er c/o mid sternal chest pressure that started today pt is warm and dry with respirations even and unlabored pt has a history of open heart surgery with two bypass grafts and had a stent placed two weeks ago     Jennifer Rodriguez RN  05/24/22 1956

## 2022-05-25 VITALS
TEMPERATURE: 98.7 F | OXYGEN SATURATION: 97 % | SYSTOLIC BLOOD PRESSURE: 95 MMHG | BODY MASS INDEX: 25.77 KG/M2 | WEIGHT: 180 LBS | HEART RATE: 57 BPM | RESPIRATION RATE: 16 BRPM | HEIGHT: 70 IN | DIASTOLIC BLOOD PRESSURE: 57 MMHG

## 2022-05-25 LAB
ALBUMIN SERPL-MCNC: 3.5 G/DL (ref 3.5–5.2)
ALP BLD-CCNC: 50 U/L (ref 40–129)
ALT SERPL-CCNC: 18 U/L (ref 5–41)
ANION GAP SERPL CALCULATED.3IONS-SCNC: 11 MMOL/L (ref 9–17)
ANTI-XA UNFRAC HEPARIN: 0.44 IU/L (ref 0.3–0.7)
AST SERPL-CCNC: 16 U/L
BILIRUB SERPL-MCNC: 0.77 MG/DL (ref 0.3–1.2)
BUN BLDV-MCNC: 13 MG/DL (ref 8–23)
BUN/CREAT BLD: 11 (ref 9–20)
CALCIUM SERPL-MCNC: 8.5 MG/DL (ref 8.6–10.4)
CHLORIDE BLD-SCNC: 106 MMOL/L (ref 98–107)
CHOLESTEROL/HDL RATIO: 1.8
CHOLESTEROL: 74 MG/DL
CO2: 21 MMOL/L (ref 20–31)
CREAT SERPL-MCNC: 1.15 MG/DL (ref 0.7–1.2)
EKG ATRIAL RATE: 60 BPM
EKG ATRIAL RATE: 63 BPM
EKG P AXIS: 48 DEGREES
EKG P AXIS: 53 DEGREES
EKG P-R INTERVAL: 200 MS
EKG P-R INTERVAL: 216 MS
EKG Q-T INTERVAL: 416 MS
EKG Q-T INTERVAL: 422 MS
EKG QRS DURATION: 86 MS
EKG QRS DURATION: 86 MS
EKG QTC CALCULATION (BAZETT): 416 MS
EKG QTC CALCULATION (BAZETT): 431 MS
EKG R AXIS: -6 DEGREES
EKG R AXIS: 1 DEGREES
EKG T AXIS: 101 DEGREES
EKG T AXIS: 84 DEGREES
EKG VENTRICULAR RATE: 60 BPM
EKG VENTRICULAR RATE: 63 BPM
GFR AFRICAN AMERICAN: >60 ML/MIN
GFR NON-AFRICAN AMERICAN: >60 ML/MIN
GFR SERPL CREATININE-BSD FRML MDRD: ABNORMAL ML/MIN/{1.73_M2}
GLUCOSE BLD-MCNC: 181 MG/DL (ref 75–110)
GLUCOSE BLD-MCNC: 240 MG/DL (ref 70–99)
GLUCOSE BLD-MCNC: 279 MG/DL (ref 75–110)
GLUCOSE BLD-MCNC: 285 MG/DL (ref 75–110)
GLUCOSE BLD-MCNC: 317 MG/DL (ref 75–110)
GLUCOSE BLD-MCNC: 322 MG/DL (ref 75–110)
HCT VFR BLD CALC: 35.9 % (ref 40.7–50.3)
HDLC SERPL-MCNC: 42 MG/DL
HEMOGLOBIN: 11.8 G/DL (ref 13–17)
LDL CHOLESTEROL: 19 MG/DL (ref 0–130)
MAGNESIUM: 1.5 MG/DL (ref 1.6–2.6)
MCH RBC QN AUTO: 31.2 PG (ref 25.2–33.5)
MCHC RBC AUTO-ENTMCNC: 32.9 G/DL (ref 28.4–34.8)
MCV RBC AUTO: 95 FL (ref 82.6–102.9)
NRBC AUTOMATED: 0 PER 100 WBC
PARTIAL THROMBOPLASTIN TIME: 123.9 SEC (ref 23.9–33.8)
PDW BLD-RTO: 12.9 % (ref 11.8–14.4)
PLATELET # BLD: 155 K/UL (ref 138–453)
PMV BLD AUTO: 9.5 FL (ref 8.1–13.5)
POTASSIUM SERPL-SCNC: 4 MMOL/L (ref 3.7–5.3)
RBC # BLD: 3.78 M/UL (ref 4.21–5.77)
SODIUM BLD-SCNC: 138 MMOL/L (ref 135–144)
TOTAL PROTEIN: 5.9 G/DL (ref 6.4–8.3)
TRIGL SERPL-MCNC: 64 MG/DL
TROPONIN, HIGH SENSITIVITY: 19 NG/L (ref 0–22)
TROPONIN, HIGH SENSITIVITY: 21 NG/L (ref 0–22)
WBC # BLD: 7.3 K/UL (ref 3.5–11.3)

## 2022-05-25 PROCEDURE — 6360000002 HC RX W HCPCS: Performed by: NURSE PRACTITIONER

## 2022-05-25 PROCEDURE — 80061 LIPID PANEL: CPT

## 2022-05-25 PROCEDURE — 6370000000 HC RX 637 (ALT 250 FOR IP): Performed by: NURSE PRACTITIONER

## 2022-05-25 PROCEDURE — 96376 TX/PRO/DX INJ SAME DRUG ADON: CPT

## 2022-05-25 PROCEDURE — G0378 HOSPITAL OBSERVATION PER HR: HCPCS

## 2022-05-25 PROCEDURE — 6360000002 HC RX W HCPCS: Performed by: EMERGENCY MEDICINE

## 2022-05-25 PROCEDURE — 84484 ASSAY OF TROPONIN QUANT: CPT

## 2022-05-25 PROCEDURE — 36415 COLL VENOUS BLD VENIPUNCTURE: CPT

## 2022-05-25 PROCEDURE — 6370000000 HC RX 637 (ALT 250 FOR IP): Performed by: INTERNAL MEDICINE

## 2022-05-25 PROCEDURE — 82947 ASSAY GLUCOSE BLOOD QUANT: CPT

## 2022-05-25 PROCEDURE — 96375 TX/PRO/DX INJ NEW DRUG ADDON: CPT

## 2022-05-25 PROCEDURE — 96366 THER/PROPH/DIAG IV INF ADDON: CPT

## 2022-05-25 PROCEDURE — 85027 COMPLETE CBC AUTOMATED: CPT

## 2022-05-25 PROCEDURE — 83735 ASSAY OF MAGNESIUM: CPT

## 2022-05-25 PROCEDURE — 85730 THROMBOPLASTIN TIME PARTIAL: CPT

## 2022-05-25 PROCEDURE — 96365 THER/PROPH/DIAG IV INF INIT: CPT

## 2022-05-25 PROCEDURE — 80053 COMPREHEN METABOLIC PANEL: CPT

## 2022-05-25 PROCEDURE — 93010 ELECTROCARDIOGRAM REPORT: CPT | Performed by: INTERNAL MEDICINE

## 2022-05-25 PROCEDURE — 93005 ELECTROCARDIOGRAM TRACING: CPT | Performed by: NURSE PRACTITIONER

## 2022-05-25 PROCEDURE — 85520 HEPARIN ASSAY: CPT

## 2022-05-25 PROCEDURE — 99223 1ST HOSP IP/OBS HIGH 75: CPT | Performed by: INTERNAL MEDICINE

## 2022-05-25 RX ORDER — INSULIN GLARGINE 100 [IU]/ML
30 INJECTION, SOLUTION SUBCUTANEOUS DAILY
Status: DISCONTINUED | OUTPATIENT
Start: 2022-05-25 | End: 2022-05-25 | Stop reason: CLARIF

## 2022-05-25 RX ORDER — ISOSORBIDE MONONITRATE 30 MG/1
30 TABLET, EXTENDED RELEASE ORAL DAILY
Qty: 30 TABLET | Refills: 1 | Status: SHIPPED | OUTPATIENT
Start: 2022-05-26 | End: 2022-06-01 | Stop reason: SDUPTHER

## 2022-05-25 RX ORDER — LEVOTHYROXINE SODIUM 88 UG/1
88 TABLET ORAL DAILY
Status: DISCONTINUED | OUTPATIENT
Start: 2022-05-25 | End: 2022-05-25 | Stop reason: HOSPADM

## 2022-05-25 RX ORDER — ACETAMINOPHEN 650 MG/1
650 SUPPOSITORY RECTAL EVERY 6 HOURS PRN
Status: DISCONTINUED | OUTPATIENT
Start: 2022-05-25 | End: 2022-05-25 | Stop reason: HOSPADM

## 2022-05-25 RX ORDER — NITROGLYCERIN 0.4 MG/1
0.4 TABLET SUBLINGUAL ONCE
Status: DISCONTINUED | OUTPATIENT
Start: 2022-05-25 | End: 2022-05-25 | Stop reason: HOSPADM

## 2022-05-25 RX ORDER — MAGNESIUM SULFATE 1 G/100ML
1000 INJECTION INTRAVENOUS PRN
Status: DISCONTINUED | OUTPATIENT
Start: 2022-05-25 | End: 2022-05-25 | Stop reason: HOSPADM

## 2022-05-25 RX ORDER — INSULIN GLARGINE 100 [IU]/ML
63 INJECTION, SOLUTION SUBCUTANEOUS DAILY
Status: DISCONTINUED | OUTPATIENT
Start: 2022-05-25 | End: 2022-05-25

## 2022-05-25 RX ORDER — ROSUVASTATIN CALCIUM 10 MG/1
20 TABLET, COATED ORAL DAILY
Status: DISCONTINUED | OUTPATIENT
Start: 2022-05-25 | End: 2022-05-25 | Stop reason: HOSPADM

## 2022-05-25 RX ORDER — SODIUM CHLORIDE 9 MG/ML
INJECTION, SOLUTION INTRAVENOUS PRN
Status: DISCONTINUED | OUTPATIENT
Start: 2022-05-25 | End: 2022-05-25 | Stop reason: HOSPADM

## 2022-05-25 RX ORDER — INSULIN LISPRO 100 [IU]/ML
27 INJECTION, SOLUTION INTRAVENOUS; SUBCUTANEOUS
Status: DISCONTINUED | OUTPATIENT
Start: 2022-05-25 | End: 2022-05-25

## 2022-05-25 RX ORDER — INSULIN LISPRO 100 [IU]/ML
0-12 INJECTION, SOLUTION INTRAVENOUS; SUBCUTANEOUS
Status: DISCONTINUED | OUTPATIENT
Start: 2022-05-25 | End: 2022-05-25 | Stop reason: HOSPADM

## 2022-05-25 RX ORDER — METOPROLOL SUCCINATE 25 MG/1
25 TABLET, EXTENDED RELEASE ORAL DAILY
COMMUNITY

## 2022-05-25 RX ORDER — SODIUM CHLORIDE 0.9 % (FLUSH) 0.9 %
10 SYRINGE (ML) INJECTION PRN
Status: DISCONTINUED | OUTPATIENT
Start: 2022-05-25 | End: 2022-05-25 | Stop reason: HOSPADM

## 2022-05-25 RX ORDER — ONDANSETRON 2 MG/ML
4 INJECTION INTRAMUSCULAR; INTRAVENOUS EVERY 6 HOURS PRN
Status: DISCONTINUED | OUTPATIENT
Start: 2022-05-25 | End: 2022-05-25 | Stop reason: HOSPADM

## 2022-05-25 RX ORDER — ISOSORBIDE MONONITRATE 30 MG/1
30 TABLET, EXTENDED RELEASE ORAL DAILY
Status: DISCONTINUED | OUTPATIENT
Start: 2022-05-25 | End: 2022-05-25 | Stop reason: HOSPADM

## 2022-05-25 RX ORDER — DEXTROSE MONOHYDRATE 50 MG/ML
100 INJECTION, SOLUTION INTRAVENOUS PRN
Status: DISCONTINUED | OUTPATIENT
Start: 2022-05-25 | End: 2022-05-25 | Stop reason: HOSPADM

## 2022-05-25 RX ORDER — ONDANSETRON 4 MG/1
4 TABLET, ORALLY DISINTEGRATING ORAL EVERY 8 HOURS PRN
Status: DISCONTINUED | OUTPATIENT
Start: 2022-05-25 | End: 2022-05-25 | Stop reason: HOSPADM

## 2022-05-25 RX ORDER — INSULIN LISPRO 100 [IU]/ML
0-6 INJECTION, SOLUTION INTRAVENOUS; SUBCUTANEOUS NIGHTLY
Status: DISCONTINUED | OUTPATIENT
Start: 2022-05-25 | End: 2022-05-25 | Stop reason: HOSPADM

## 2022-05-25 RX ORDER — LOSARTAN POTASSIUM 50 MG/1
50 TABLET ORAL DAILY
Qty: 30 TABLET | Refills: 1 | Status: SHIPPED | OUTPATIENT
Start: 2022-05-25 | End: 2022-06-01 | Stop reason: SDUPTHER

## 2022-05-25 RX ORDER — INSULIN LISPRO 100 [IU]/ML
9 INJECTION, SOLUTION INTRAVENOUS; SUBCUTANEOUS
Status: DISCONTINUED | OUTPATIENT
Start: 2022-05-25 | End: 2022-05-25

## 2022-05-25 RX ORDER — SODIUM CHLORIDE 0.9 % (FLUSH) 0.9 %
5-40 SYRINGE (ML) INJECTION EVERY 12 HOURS SCHEDULED
Status: DISCONTINUED | OUTPATIENT
Start: 2022-05-25 | End: 2022-05-25 | Stop reason: HOSPADM

## 2022-05-25 RX ORDER — ACETAMINOPHEN 325 MG/1
650 TABLET ORAL EVERY 6 HOURS PRN
Status: DISCONTINUED | OUTPATIENT
Start: 2022-05-25 | End: 2022-05-25 | Stop reason: HOSPADM

## 2022-05-25 RX ORDER — POTASSIUM CHLORIDE 7.45 MG/ML
10 INJECTION INTRAVENOUS PRN
Status: DISCONTINUED | OUTPATIENT
Start: 2022-05-25 | End: 2022-05-25 | Stop reason: HOSPADM

## 2022-05-25 RX ORDER — INSULIN LISPRO 100 [IU]/ML
6 INJECTION, SOLUTION INTRAVENOUS; SUBCUTANEOUS
Status: DISCONTINUED | OUTPATIENT
Start: 2022-05-25 | End: 2022-05-25

## 2022-05-25 RX ORDER — INSULIN GLARGINE 100 [IU]/ML
30 INJECTION, SOLUTION SUBCUTANEOUS DAILY
Status: DISCONTINUED | OUTPATIENT
Start: 2022-05-25 | End: 2022-05-25 | Stop reason: HOSPADM

## 2022-05-25 RX ORDER — LOSARTAN POTASSIUM 100 MG/1
100 TABLET ORAL DAILY
Status: DISCONTINUED | OUTPATIENT
Start: 2022-05-25 | End: 2022-05-25 | Stop reason: HOSPADM

## 2022-05-25 RX ORDER — POTASSIUM CHLORIDE 20 MEQ/1
40 TABLET, EXTENDED RELEASE ORAL PRN
Status: DISCONTINUED | OUTPATIENT
Start: 2022-05-25 | End: 2022-05-25 | Stop reason: HOSPADM

## 2022-05-25 RX ORDER — ASPIRIN 81 MG/1
81 TABLET, CHEWABLE ORAL DAILY
Status: DISCONTINUED | OUTPATIENT
Start: 2022-05-25 | End: 2022-05-25 | Stop reason: HOSPADM

## 2022-05-25 RX ADMIN — INSULIN GLARGINE 30 UNITS: 100 INJECTION, SOLUTION SUBCUTANEOUS at 17:10

## 2022-05-25 RX ADMIN — TICAGRELOR 90 MG: 90 TABLET ORAL at 07:44

## 2022-05-25 RX ADMIN — HEPARIN SODIUM 12 UNITS/KG/HR: 10000 INJECTION, SOLUTION INTRAVENOUS at 00:08

## 2022-05-25 RX ADMIN — LOSARTAN POTASSIUM 100 MG: 100 TABLET, FILM COATED ORAL at 07:44

## 2022-05-25 RX ADMIN — MAGNESIUM SULFATE HEPTAHYDRATE 1000 MG: 1 INJECTION, SOLUTION INTRAVENOUS at 16:40

## 2022-05-25 RX ADMIN — ASPIRIN 81 MG CHEWABLE TABLET 81 MG: 81 TABLET CHEWABLE at 07:45

## 2022-05-25 RX ADMIN — METFORMIN HYDROCHLORIDE 1000 MG: 500 TABLET ORAL at 07:44

## 2022-05-25 RX ADMIN — METFORMIN HYDROCHLORIDE 1000 MG: 500 TABLET ORAL at 17:10

## 2022-05-25 RX ADMIN — LEVOTHYROXINE SODIUM 88 MCG: 0.09 TABLET ORAL at 07:45

## 2022-05-25 RX ADMIN — INSULIN LISPRO 8 UNITS: 100 INJECTION, SOLUTION INTRAVENOUS; SUBCUTANEOUS at 17:10

## 2022-05-25 RX ADMIN — MAGNESIUM SULFATE HEPTAHYDRATE 1000 MG: 1 INJECTION, SOLUTION INTRAVENOUS at 15:19

## 2022-05-25 RX ADMIN — HEPARIN SODIUM 4000 UNITS: 1000 INJECTION INTRAVENOUS; SUBCUTANEOUS at 00:06

## 2022-05-25 RX ADMIN — ISOSORBIDE MONONITRATE 30 MG: 30 TABLET, EXTENDED RELEASE ORAL at 10:11

## 2022-05-25 RX ADMIN — ROSUVASTATIN CALCIUM 20 MG: 10 TABLET, FILM COATED ORAL at 07:45

## 2022-05-25 ASSESSMENT — PAIN SCALES - GENERAL: PAINLEVEL_OUTOF10: 0

## 2022-05-25 NOTE — PROGRESS NOTES
Patient arrival to room 2032 at 0230 via stretcher from ED. Oriented to room, call light and safety protocols. Vitals taken, telemetry applied and operating properly. Admission complaint of chest tightness not a factor at this time. Heparin drip infusing and all questions answered. Admission questions completed and assessment entered into chart. Will complete consults and monitor appropriately.

## 2022-05-25 NOTE — FLOWSHEET NOTE
Dr. Baldev Francis arrived to the patient's bedside for evaluation and was updated on the patient's current status via RN. New orders received to d/c heparin gtt at this time. Patient to begin on imdur 30 mg p.o. now. Will need to follow-up with Dr. Bhargavi Castañeda in 2 weeks.

## 2022-05-25 NOTE — ED PROVIDER NOTES
[address] [facility]  Emergency Medicine Department    Pt Name: J Luis Orozco  MRN: [id number]  [place] 1940  Date of evaluation: 5/24/2022  Provider: Ezra Suh MD    CHIEF COMPLAINT     Chief Complaint   Patient presents with    Chest Pain     onset 1hr ago. pt states had stents placed here a few weeks ago. HISTORY OF PRESENT ILLNESS  (Location/Symptom, Timing/Onset, Context/Setting,Quality, Duration, Modifying Factors, Severity.)   J Luis Orozco is a [de-identified] y.o. male with a history of coronary artery disease status post CABG x2 as well as stent placement who presents to the emergency department complaining of chest pain that started about 30 minutes ago. He was seen 2 weeks ago for chest pain that was very similar to the chest pain he is experiencing today. He had a catheterization at that time and stents placed. He reports the pain is improved and now is just a pressure. No shortness of breath. No nausea. He rates the current pain is a 3 out of 10. The pain started at rest.  He did not take anything for the pain prior to coming to the ER. He does take baby aspirin daily and did take his dose today. Nursing Notes were reviewed. ALLERGIES     Patient has no known allergies.     CURRENT MEDICATIONS       Previous Medications    ACETAMINOPHEN (TYLENOL) 500 MG TABLET    Take 2 tablets by mouth every 6 hours as needed for Pain    ASPIRIN 81 MG CHEWABLE TABLET    Take 81 mg by mouth daily    INSULIN NPH ISOPHANE & REGULAR (HUMULIN;NOVOLIN) (70-30) 100 UNIT/ML INJECTION PEN    Inject 30 Units into the skin three times daily SLIDING SCALE    LEVOTHYROXINE (SYNTHROID) 88 MCG TABLET    Take 1 tablet by mouth daily 1 tablet daily    LOSARTAN (COZAAR) 100 MG TABLET    Take 100 mg by mouth daily    METFORMIN (GLUCOPHAGE) 1000 MG TABLET    Take 1,000 mg by mouth 2 times daily (with meals)     MULTIPLE VITAMINS-MINERALS (THERAPEUTIC MULTIVITAMIN-MINERALS) TABLET    Take 1 tablet by mouth daily    ROSUVASTATIN (CRESTOR) 20 MG TABLET    Take 20 mg by mouth daily    TICAGRELOR (BRILINTA) 90 MG TABS TABLET    Take 1 tablet by mouth 2 times daily    VITAMIN D (CHOLECALCIFEROL) 1000 UNIT TABS TABLET    Take 1,000 Units by mouth daily       PAST MEDICAL HISTORY         Diagnosis Date    Arthritis     CAD (coronary artery disease)     CHF (congestive heart failure) (HCC)     Diabetes mellitus ([address]     History of blood transfusion     Hyperlipidemia     Hypertension     PVC (premature ventricular contraction)     SOB (shortness of breath)     Thyroid disease        SURGICAL HISTORY           Procedure Laterality Date    CARDIAC SURGERY      COLONOSCOPY  10/5/2011    normal    COLONOSCOPY  3/7/2006    small internal hemorrhoids, diverticulosis    COLONOSCOPY  11/14/2001    small internal hemorrhoids, hyperplastic polyp    CORONARY ANGIOPLASTY WITH STENT PLACEMENT  05/06/2022    EYE SURGERY      Cataract    JOINT REPLACEMENT      Knee        FAMILY HISTORY           Family history unknown: Yes     No family status information on file. SOCIAL HISTORY      reports that he quit smoking about 38 years ago. His smoking use included pipe. He started smoking about 47 years ago. He has a 4.50 pack-year smoking history. He has never used smokeless tobacco. He reports current alcohol use. He reports that he does not use drugs. REVIEW OF SYSTEMS    (2-9 systems for level 4, 10 or more for level 5)     Review of Systems   Constitutional: Negative for chills and fever. Respiratory: Negative for shortness of breath. Cardiovascular: Positive for chest pain. Gastrointestinal: Negative for abdominal pain, nausea and vomiting. Allergic/Immunologic: Negative for immunocompromised state. Neurological: Negative for dizziness, weakness and light-headedness. All other systems reviewed and are negative.       PHYSICAL EXAM    (up to 7 for level 4, 8 or more for level 5)     ED Triage Vitals   BP Temp Temp src Heart Rate Resp SpO2 Height Weight   05/24/22 1926 05/24/22 1926 -- 05/24/22 1924 05/24/22 1924 05/24/22 1924 05/24/22 1924 05/24/22 1924   (!) 148/70 97.8 °F (36.6 °C)  59 17 100 % 5' 10\" (1.778 m) 180 lb (81.6 kg)       Physical Exam  Vitals and nursing note reviewed. Constitutional:       General: He is not in acute distress. Appearance: He is well-developed. He is not diaphoretic. HENT:      Head: Normocephalic and atraumatic. Nose: Nose normal.      Mouth/Throat:      Mouth: Mucous membranes are moist.   Eyes:      Extraocular Movements: Extraocular movements intact. Cardiovascular:      Rate and Rhythm: Normal rate and regular rhythm. Pulses: Normal pulses. Heart sounds: Normal heart sounds. Pulmonary:      Effort: Pulmonary effort is normal. No respiratory distress. Breath sounds: Normal breath sounds. Abdominal:      Palpations: Abdomen is soft. Tenderness: There is no abdominal tenderness. Musculoskeletal:         General: No deformity. Normal range of motion. Cervical back: Normal range of motion and neck supple. Right lower leg: No edema. Left lower leg: No edema. Skin:     General: Skin is warm and dry. Neurological:      Mental Status: He is alert and oriented to person, place, and time. Psychiatric:         Behavior: Behavior normal.         Thought Content: Thought content normal.         Judgment: Judgment normal.         DIAGNOSTIC RESULTS     EKG:All EKG's are interpreted by the Emergency Department Physician who either signs or Co-signs this chart in the absence of a cardiologist.    Interpretation: Normal sinus rhythm with a rate of 60. Normal Axis. Normal intervals. No ST elevations.       RADIOLOGY:   Non-plain film images such as CT, Ultrasound and MRI are read by theradiologist. Plain radiographic images are visualized and preliminarily interpreted by the emergency physician with the below findings:    Interpretation per the Radiologist below, if available at the time of this note:    XR CHEST PORTABLE   Final Result   No acute process. ED BEDSIDE ULTRASOUND:   Performed by ED Physician - none    LABS:  Labs Reviewed   CBC WITH AUTO DIFFERENTIAL - Abnormal; Notable for the following components:       Result Value    Seg Neutrophils 70 (*)     Lymphocytes 16 (*)     Eosinophils % 5 (*)     Absolute Lymph # 1.05 (*)     All other components within normal limits   COMPREHENSIVE METABOLIC PANEL W/ REFLEX TO MG FOR LOW K - Abnormal; Notable for the following components:    Glucose 310 (*)     All other components within normal limits   BRAIN NATRIURETIC PEPTIDE - Abnormal; Notable for the following components:    Pro- (*)     All other components within normal limits   POC GLUCOSE FINGERSTICK - Abnormal; Notable for the following components:    POC Glucose 240 (*)     All other components within normal limits   TROPONIN   LIPASE   SPECIMEN REJECTION   PROTIME-INR   APTT   TROPONIN   ANTI-XA, UNFRACTIONATED HEPARIN   ANTI-XA, UNFRACTIONATED HEPARIN       All other labs were within normal range or not returned as of this dictation. EMERGENCYDEPARTMENT COURSE and DIFFERENTIAL DIAGNOSIS/MDM:   Vitals:    Vitals:    05/24/22 2300 05/24/22 2310 05/24/22 2320 05/24/22 2340   BP:    (!) 136/57   Pulse: 52 57 59 59   Resp: 25 21 22 22   Temp:       SpO2: 99% 98% 99% 98%   Weight:       Height:         82-year-old male with an extensive history of coronary artery disease presenting with chest pain. Given that the pain is similar in quality to chest pain he presented with 2 weeks ago requiring catheterization, and despite negative troponin and reassuring EKG, I feel admission for observation is most appropriate. Discussed with cardiology, Dr. Bryan Tellez who recommended heparinization. The patient will be seen in the morning by cardiology to determine if repeat catheterization is necessary.   The patient's chest pain is improving without intervention. He is otherwise well-appearing with reassuring vital signs and no distress. We will continue to monitor until he is transferred to the inpatient unit. CONSULTS:  IP CONSULT TO CARDIOLOGY  IP CONSULT TO INTERNAL MEDICINE    PROCEDURES:  None indicated    FINAL IMPRESSION     1. Chest pain, unspecified type          DISPOSITION/PLAN   DISPOSITION      PATIENT REFERRED TO:   No follow-up provider specified.   DISCHARGE MEDICATIONS:     New Prescriptions    No medications on file     (Please note that portions of this note were completed with a voice recognition program.  Efforts were made to edit the dictations butoccasionally words are mis-transcribed.)    Jamie Mendoza MD  Attending Emergency Physician         Jamie Mendoza MD  05/25/22 [id number]

## 2022-05-25 NOTE — CARE COORDINATION
Case Management Initial Discharge Plan  Brendan Ayala,             Met with:patient or spouse/SO to discuss discharge plans. Information verified: address, contacts, phone number, , insurance Yes  Insurance Provider: Sean [employer] [facility]    Emergency Contact/Next of Kin name & number: spouse/Corine   [phone]  Who are involved in patient's support system? spouse    PCP: Henry Bar MD  Date of last visit:       Discharge Planning    Living Arrangements:        Home has 1 stories  2 stairs to climb to get into front door, 0stairs to climb to reach second floor  Location of bedroom/bathroom in home main    Patient able to perform ADL's:Independent    Current Services (outpatient & in home) none  DME equipment: 0  DME provider: 0    Is patient receiving oral anticoagulation therapy? Yes    If indicated:   Physician managing anticoagulation treatment: cardiology  Where does patient obtain lab work for ATC treatment? Does patient have any issues/concerns obtaining medications? No  If yes, what are patient's concerns? Is there a preferred Pharmacy after hours or on weekends? Yes    If yes, which pharmacy? [facility]    Potential Assistance Needed:       Patient agreeable to home care: No  [place] of choice provided:  n/a    Prior SNF/Rehab Placement and Facility: n/a  Agreeable to SNF/Rehab: No  [place] of choice provided: n/a     Evaluation: no    Expected Discharge date:       Patient expects to be discharged to: If home: is the family and/or caregiver wiling & able to provide support at home? yes  Who will be providing this support?  Self and spouse*    Follow Up Appointment: Best Day/ Time:      Transportation provider: spouse  Transportation arrangements needed for discharge: No    Readmission Risk              Risk of Unplanned Readmission:  15             Does patient have a readmission risk score greater than 14?: Yes  If yes, follow-up appointment must be made

## 2022-05-25 NOTE — DISCHARGE SUMMARY
[facility]  Office: [address], DO, Tiasimran Wisam, DO, Marina Lamar, DO, Geno Pepe, DO, Eran Lopez MD, Selina Resendiz MD, Bambi Muhammad MD, Jorge Tena MD, Luis E Black MD, Monica Shin MD, Cait Jordan MD, Latrice Bella, DO, Sidney Thomas DO, Nori Doan MD,  Cyndi Narvaez, DO, Alexx Quan MD, Yasir Ogden MD, Manisha Bocanegra MD, Yasmany Pickering DO, Alison Cornejo MD, Radha Foster MD, Ijeoma Lazo MD, Paul Jacobs [facility], [facility] ([place], [place]), CNP, Merari Carlin, CNP, Leigh Ann Carroll, CNP, Vahid Aguilar, [facility], Airam Watt, CNP, Anum Gutierrez PA-C, Brianna Mcpherson, [facility], Nathan Boykin, CNP, Doris Gonzalez, CNP, Junie Oreilly, CNP, Wally Hopkins, CNS, Kiley Delatorre, [facility], Akil Win, CNP, Marci Monreal, CNP, Nba Corley, [facility]    Discharge Summary     Patient ID: Scar Cross  :  1940   MRN: [id number]     ACCOUNT:  [de-identified]   Patient's PCP: Aleena Mcgill MD  Admit Date: 2022   Discharge Date: 2022     Length of Stay: 1  Code Status:  Full Code  Admitting Physician: Mariela Pepe DO  Discharge Physician: Huong Tong MD     Active Discharge Diagnoses:     Hospital Problem Lists:  Principal Problem:    Chest pain  Active Problems:    Essential hypertension    Hyperlipidemia    Type 2 diabetes mellitus without complication, with long-term current use of insulin ([facility] [address]  Resolved Problems:    * No resolved hospital problems. *      Admission Condition:  fair     Discharged Condition: good    Hospital Stay:     Hospital Course:  Scar Cross is a [de-identified] y.o. male who was admitted for the management of  Chest pain , presented to ER with *Chest Pain (onset 1hr ago. pt states had stents placed here a few weeks ago. )    Richardine Cross is a [de-identified] y.o. Non- / non  male who presents with Chest Pain (onset 1hr ago.  pt states had stents placed here a few weeks ago. )   and is admitted to the hospital for the management of Chest pain.     Pt is an [de-identified] yr old middle [facility] man with PMH CAD, HTN, and DM2 who presented with chest pain. He is status post CABG x2, HERACLIO to LCx in 2019, and again this May 6 2022. Patient follows with Dr. Emilia Buitrago. He did well post cardiac cath 2 weeks ago, with HERACLIO placed. However he developed substernal chest pain last night at rest.  No EKG changes per notes. Patient was placed on IV heparin with troponins 17, 19, 21. Cardiology stopped the heparin drip and started the patient on Imdur.     Apparently he was noncompliant with his insulin and blood pressure medications for the last several months prior to the admission on May 6. His blood sugar is elevated 200-3 100s this admission since his insulin 70/30 was not restarted. He has been chest pain-free throughout the day and wants to go home.       No fevers, chills, sore throat, rhinorrhea, cough or shortness of breath. His wife is present. Assessment/ Plan     1. Chest pain- Trop flat, Cardiology stopped the Heparin, and started patient on Imdur. Con't ASA, statin, BB, ARB  2. HTN- BP low normal, will reduce Losartan 50 mg daily,cont Metoprolol, Imdur started by Cardiology, monitor BP at home, dw patient's wife  3. DM2- Lantus 30 units, SSI right now, -300s, f/u with Endocrine outpatient  4. Hypothyroid- con't Synthroid  5. Hypomagnesemia- replace Magnesium 1.5  6. Gi/ DVT proph     Okay to DC home later today, check BGT 1 hour after dinner. Okay to go if BS < 300. Follow up with Cardiology in 1-2 weeks and PCP      Significant therapeutic interventions: BP control, Blood glucose control    Significant Diagnostic Studies:       Radiology:  XR CHEST PORTABLE    Result Date: 5/24/2022  No acute process.        Consultations:    Consults:     Final Specialist Recommendations/Findings:   IP CONSULT TO CARDIOLOGY  IP CONSULT TO INTERNAL MEDICINE  IP CONSULT TO CARDIOLOGY  IP CONSULT TO CARDIOLOGY      The patient was seen and examined on day of discharge and this discharge summary is in conjunction with any daily progress note from day of discharge. Discharge plan:     Disposition: Home    Physician Follow Up:     Dr. Emilie Broussard - 1-2 weeks  Cardiology 2 weeks      Diet: cardiac diet and diabetic diet    Activity: As tolerated    Instructions to Patient: Follow BP and blood glucose at home    Discharge Medications:      Medication List      ASK your doctor about these medications    acetaminophen 500 MG tablet  Commonly known as: TYLENOL  Take 2 tablets by mouth every 6 hours as needed for Pain     aspirin 81 MG chewable tablet     Insulin NPH Isophane & Regular (70-30) 100 UNIT per ML injection pen  Commonly known as: HUMULIN;NOVOLIN     levothyroxine 88 MCG tablet  Commonly known as: SYNTHROID  Take 1 tablet by mouth daily 1 tablet daily     losartan 100 MG tablet  Commonly known as: COZAAR     metFORMIN 1000 MG tablet  Commonly known as: GLUCOPHAGE     metoprolol succinate 25 MG extended release tablet  Commonly known as: TOPROL XL     Rosuvastatin Calcium 40 MG Cpsp     therapeutic multivitamin-minerals tablet  Take 1 tablet by mouth daily     ticagrelor 90 MG Tabs tablet  Commonly known as: BRILINTA  Ask about: Which instructions should I use?     vitamin D 1000 UNIT Tabs tablet  Commonly known as: CHOLECALCIFEROL            No discharge procedures on file. Time Spent on discharge is  20 mins in patient examination, evaluation, counseling as well as medication reconciliation, prescriptions for required medications, discharge plan and follow up. Electronically signed by   Robert Horvath MD  5/25/2022  4:42 PM      Thank you Dr. Lala Puente MD for the opportunity to be involved in this patient's care.

## 2022-05-25 NOTE — FLOWSHEET NOTE
Dr. Sheree Patel returned call and was notified that patient remains free of chest pain and is tolerating the imdur without complication. Per Dr. Sheree Patel, patient is okay for discharge home. Follow-up with cardiologist in 2 weeks.

## 2022-05-25 NOTE — CONSULTS
[facility]  Inpatient Cardiology Consult             Date:   5/25/2022  Patient name: Sarah King  Date of admission:  5/24/2022  7:35 PM  MRN:   [id number]  YOB: 1940      Reason for Admission:  Chest pain    CHIEF COMPLAINT:  Chest pain    History Obtained From:  Patient and medical record    HISTORY OF PRESENT ILLNESS:    The patient is a [de-identified] y.o gentleman with h/o DM, HTN, HLP and CAD, s/p CABG X2 with HERACLIO to LCX in 2019 and again on 5/6/22, admitted from ER last evening for chest pain. He follows with Dr. Luis Angel Stahl, and had cardiac catheterization for similar chest pain on 5/6/22, showing patent bypass grafts with distal LAD disease, and proximal LCX showing in-stent restenosis, successfully treated with HERACLIO. He did well over the past two weeks, however yesterday developed substernal chest pain at rest, with initial intensity of 7-8/10. There was no associated SOB, nausea or diaphoresis. His family drove him to the ER, and by that time he had only mild chest pressure of 3/10 intensity, with EKG showing no changes. He was placed on IV heparin and has had no chest discomfort overnight, with serial troponins of 17, 19 and 21 ng/L. Past Medical History:   has a past medical history of Arthritis, CAD (coronary artery disease), CHF (congestive heart failure) ([facility] [address] Diabetes mellitus ([facility] [address] History of blood transfusion, Hyperlipidemia, Hypertension, PVC (premature ventricular contraction), SOB (shortness of breath), and Thyroid disease. Past Surgical History:   has a past surgical history that includes Colonoscopy (10/5/2011); Colonoscopy (3/7/2006); Colonoscopy (11/14/2001); Cardiac surgery; joint replacement; eye surgery; and Coronary angioplasty with stent (05/06/2022). Home Medications:    Prior to Admission medications    Medication Sig Start Date End Date Taking?  Authorizing Provider   ticagrelor (BRILINTA) 90 MG TABS tablet Take 1 tablet by mouth 2 times daily 5/7/22   Mart Muñoz MD   rosuvastatin (CRESTOR) 20 MG tablet Take 20 mg by mouth daily    Historical Provider, MD   acetaminophen (TYLENOL) 500 MG tablet Take 2 tablets by mouth every 6 hours as needed for Pain 11/17/21   Leslie Stevenson MD   Multiple Vitamins-Minerals (THERAPEUTIC MULTIVITAMIN-MINERALS) tablet Take 1 tablet by mouth daily 9/6/17   Inga Corado MD   levothyroxine (SYNTHROID) 88 MCG tablet Take 1 tablet by mouth daily 1 tablet daily 9/6/17   Inga Corado MD   vitamin D (CHOLECALCIFEROL) 1000 UNIT TABS tablet Take 1,000 Units by mouth daily    Historical Provider, MD   metFORMIN (GLUCOPHAGE) 1000 MG tablet Take 1,000 mg by mouth 2 times daily (with meals)     Historical Provider, MD   losartan (COZAAR) 100 MG tablet Take 100 mg by mouth daily    Historical Provider, MD   aspirin 81 MG chewable tablet Take 81 mg by mouth daily    Historical Provider, MD   Insulin NPH Isophane & Regular (HUMULIN;NOVOLIN) (70-30) 100 UNIT/ML injection pen Inject 30 Units into the skin three times daily [address] [place] [address] Provider, MD       Allergies:  Patient has no known allergies. Social History:   reports that he quit smoking about 38 years ago. His smoking use included pipe. He started smoking about 47 years ago. He has a 4.50 pack-year smoking history. He has never used smokeless tobacco. He reports current alcohol use. He reports that he does not use drugs. Family History:   Positive for early CAD    REVIEW OF SYSTEMS:    · Constitutional: there has been no unanticipated weight loss. There's been No change in energy level, No change in activity level. · Eyes: No visual changes or diplopia. No scleral icterus. · ENT: No Headaches, hearing loss or vertigo. No mouth sores or sore throat. · Cardiovascular: No problem  · Respiratory: No previous reported problems  · Gastrointestinal: No abdominal pain, appetite loss, blood in stools.  No change in bowel or bladder habits. · Genitourinary: No dysuria, trouble voiding, or hematuria. · Musculoskeletal:  No gait disturbance, No weakness or joint complaints. · Integumentary: No rash or pruritis. · Neurological: No headache, diplopia, change in muscle strength, numbness or tingling. No change in gait, balance, coordination, mood, affect, memory, mentation, behavior. · Psychiatric: No anxiety, or depression. · Endocrine: No temperature intolerance. No excessive thirst, fluid intake, or urination. No tremor. · Hematologic/Lymphatic: No abnormal bruising or bleeding, blood clots or swollen lymph nodes. · Allergic/Immunologic: No nasal congestion or hives. PHYSICAL EXAM:    Physical Examination:    BP (!) 143/59   Pulse 57   Temp 97.4 °F (36.3 °C) (Temporal)   Resp 16   Ht 5' 10\" (1.778 m)   Wt 180 lb (81.6 kg)   SpO2 97%   BMI 25.83 kg/m²    Constitutional and General Appearance: alert, cooperative, no distress and appears stated age  HEENT: PERRL, no cervical lymphadenopathy. No masses palpable. Normal oral mucosa  Respiratory:  · Normal excursion and expansion without use of accessory muscles  · Resp Auscultation: Good respiratory effort. No for increased work of breathing. On auscultation: clear to auscultation bilaterally  Cardiovascular:  · The apical impulse is not displaced  · Heart tones are crisp and normal. regular S1 and S2. Murmurs:  None  · Jugular venous pulsation Normal  · The carotid upstroke is normal in amplitude and contour without delay or bruit  · Peripheral pulses are symmetrical and full   Abdomen:  · No masses or tenderness  · Bowel sounds present  Extremities:  ·  No Cyanosis or Clubbing  ·  Lower extremity edema: None  ·  Skin: Warm and dry; right femoral access site healed with no ecchymosis or hematoma  Neurological:  · Alert and oriented.   · Moves all extremities well  · No abnormalities of mood, affect, memory, mentation, or behavior are noted    DATA:    Diagnostics:      EKG: Sinus rhythm, 63 bpm; 1st degree AV block; non-specific ST and T wave abnormality    CARDIAC CATHETERIZATION ( 5/6/22)   Multi-vessel Coronary Artery Disease. Patent SVG to LAD with distal LAD disease   Patent SVG-PDA with moderate insertional stenosis. High grade instent stenosis of the proximal Cx successfully stented with   drug eluting stent. 2D ECHO ( 9/26/19)  Normal left ventricle size, wall thickness and function with an estimated EF  > 55%. No segmental wall motion abnormalities seen. Grade I (mild) left ventricular diastolic dysfunction. Normal right ventricular size and function. Aortic valve is trileaflet. No aortic stenosis. Trivial aortic insufficiency. Normal aortic root dimension. Mild mitral regurgitation. Mild tricuspid regurgitation. Normal right ventricular systolic pressure. No significant pericardial effusion is seen. Labs:     CBC:   Recent Labs     05/24/22 1958 05/25/22  0601   WBC 6.5 7.3   HGB 13.4 11.8*   HCT 41.2 35.9*    155     BMP:   Recent Labs     05/24/22 1958 05/25/22  0601    138   K 4.7 4.0   CO2 22 21   BUN 18 13   CREATININE 1.04 1.15   LABGLOM >60 >60   GLUCOSE 310* 240*     BNP: No results for input(s): BNP in the last 72 hours. PT/INR:   Recent Labs     05/24/22 2033   PROTIME 14.2   INR 1.1     APTT:  Recent Labs     05/24/22 2033 05/25/22  0601   APTT 32.2 123.9*     CARDIAC ENZYMES:No results for input(s): CKTOTAL, CKMB, CKMBINDEX, TROPONINI in the last 72 hours. FASTING LIPID PANEL:  Lab Results   Component Value Date    HDL 42 05/25/2022    TRIG 64 05/25/2022     LIVER PROFILE:  Recent Labs     05/24/22 1958 05/25/22  0601   AST 21 16   ALT 27 18   LABALBU 4.3 3.5         IMPRESSION:    1. Single episode of chest pain, now resolved, two weeks s/p HERACLIO to proximal LCX. EKG shows no acute changes, with marginally elevated but stable high-sensitivity troponins.   2. CAD, h/o CABG X2 with HERACLIO to LCX in 2019 and again on 5/6/22 for proximal in-stent restenosis. SVG-LAD and SVG-RCA both patent, with distal disease noted in the LAD. 3. Essential HTN with preserved LVEF; no CHF on exam  4. Type II DM  5. Hyperlipidemia    Patient Active Problem List   Diagnosis    Diverticulosis of colon    Essential hypertension    Type 2 diabetes mellitus ([facility] [address]    Hyperlipidemia    Mixed hyperlipidemia    Type 2 diabetes mellitus without complication, with long-term current use of insulin ([address]    Coronary artery disease involving native coronary artery of native heart without angina pectoris    Primary osteoarthritis involving multiple joints    Overweight (BMI 25.0-29. 9)    Status post cardiac catheterization    Autoimmune thyroiditis    Disorder associated with type 2 diabetes mellitus ([address]    Hesitancy of micturition    PVC (premature ventricular contraction)    Presence of aortocoronary bypass graft    SOB (shortness of breath)    Ventricular premature depolarization    Vitamin deficiency    Chest pain    Unstable angina (HCC)       RECOMMENDATIONS:  1. Discontinue IV heparin  2. Continue ASA and Brilinta  3. Add Imdur 30 mg daily  4. Continue losartan and rosuvastatin  5. Increase activity today; acceptable for discharge later today if he has no further chest pain. 6. Pt was instructed to call Dr. Nba Mckeon office to arrange f/u within the next two weeks. He understands to return to the ER immediately for any further chest discomfort. Discussed with patient and nursing.     Electronically signed by Angélica Raygoza MD on 5/25/2022 at 10:15 AM.  [facility]

## 2022-05-25 NOTE — FLOWSHEET NOTE
RN notified Dr. Darryl Gilford via perfect serve that cardiology has signed off. Awaiting response at this time.

## 2022-05-25 NOTE — FLOWSHEET NOTE
[facility] NOTE    Room # 2032/2032-01   Name: Kaitlin Palacios              Reason for visit: Routine    I visited the patient. Admit Date & Time: 5/24/2022  7:35 PM    Assessment:  Kaitlin Palacios is a [de-identified] y.o. male. Upon entering the room patient was sleeping. Intervention:   provided a ministry presence and brief prayer. Outcome:  Patient did not respond. Plan:  Chaplains will remain available to offer spiritual and emotional support as needed. Electronically signed by Edyta Vera.  Chaplain David, on 5/25/2022 at [address]       05/25/22 8792   Encounter Summary   Service Provided For: Patient   Referral/Consult From: Luc   Last Encounter  05/25/22  (PT: sleeping)   Complexity of Encounter Low   Begin Time 1108   End Time  1110   Total Time Calculated 2 min   Encounter    Type Initial Screen/Assessment   Assessment/Intervention/Outcome   Assessment Unable to assess   Intervention Prayer (assurance of)/[place];Sustaining Presence/Ministry of presence

## 2022-05-25 NOTE — ED NOTES
ED to inpatient nurses report     Chief Complaint   Patient presents with    Chest Pain     onset 1hr ago. pt states had stents placed here a few weeks ago.        Present to ED from home pt c/o midsternal chest pressure pt had cardiac stents placed two weeks ago by Dr. Chelsea Morrow  LOC: alert and oriented x 4  Vital signs   Vitals:    05/25/22 0003 05/25/22 0013 05/25/22 0033 05/25/22 0040   BP: (!) 123/54 (!) 114/51 (!) 129/53 (!) 129/53   Pulse: 62 62 65 61   Resp: 15      Temp:       SpO2: 98% 98% 98% 97%   Weight:       Height:          Oxygen Baseline room air  Current needs required none  LDAs:   Peripheral IV 05/24/22 Right Antecubital (Active)   Site Assessment Clean, dry & intact 05/24/22 1954   Line Status Blood return noted 05/24/22 1954     Mobility: independent  Pending ED orders:see orders  Present condition:pt is on a heparin gtt pt has a pending troponin level pt has brilinta ordered awaiting from pharmacy pt bs on arrival was 310 blood sugar rechecked and was 240 pt was given his metformin and one liter of normal saline  Code Status: full  Consults: IP CONSULT TO CARDIOLOGY  IP CONSULT TO INTERNAL MEDICINE  IP CONSULT TO CARDIOLOGY  IP CONSULT TO CARDIOLOGY  [x]  Hospitalist  Completed  [x] yes [] no Who: Alba Phillips[]  Medicine  Completed  [] yes [] No Who:   [x]  Cardiology  Completed  [x] yes [] No Who: Dr. Reginaldo Sandy   Completed  [] yes [] No Who:   []  Neurology  Completed  [] yes [] No Who:   []  Nephrology Completed  [] yes [] No Who:    []  Vascular  Completed  [] yes [] No Who:   []  Ortho  Completed  [] yes [] No Who:     []  Surgery  Completed  [] yes [] No Who:    []  Urology  Completed  [] yes [] No Who:    []  CT Surgery Completed  [] yes [] No Who:   []  Podiatry  Completed  [] yes [] No Who:    []  Other    Completed  [] yes [] No Who:  Interventions: see above  Important Events: as charted        Electronically signed by Miguel Lafleur RN on 5/25/2022 at 1:16 AM     Kelsey Lal SAMM  05/25/22 0123

## 2022-05-25 NOTE — H&P
[facility]  Office: [address], DO, Ray Kras, DO, Bronwyn Cachorro, DO, Chas Venkat Pepe, DO, Tomer Zafar MD, Cayden Nelson MD, Jad Suarez MD, Reji Lovell MD, Jian Booth MD, Sera Kim MD, Milagros Bardales MD, Robert Ruiz, DO, Nayana Roque, DO, Do Ortiz MD,  Yvan Benitez, DO, Shiraz Bauer MD, Mitchell Benoit MD, Diana Renner MD, Aida Valencia DO, Suzy Grullon MD, Amina Guadalupe MD, Terra Palma MD, Mariely Alamo, [facility], [facility], CNP, Alyson Oliver, CNP, Ty Section, CNP, Justino Goins, CNP, Sandy Solders, CNP, Sheri Wright PA-C, Elfego Liao, [facility], Bautista Husbands, CNP, Luz Marina Bains, CNP, James Genever, CNP, Lizzy Almodovar, CNS, Orysia Soda, [facility], Starlett Market, CNP, Fiona Betters, CNP, Shruthi Fitting, [facility]    HISTORY AND PHYSICAL EXAMINATION            Date:   5/25/2022  Patient name:  Sathya Montilla  Date of admission:  5/24/2022  7:35 PM  MRN:   [id number]  Account:  [de-identified]  YOB: 1940  PCP:    Tayla Gallagher MD  Room:   2032/2032-01  Code Status:    Full Code    Chief Complaint:     Chief Complaint   Patient presents with    Chest Pain     onset 1hr ago. pt states had stents placed here a few weeks ago. History Obtained From:     patient, electronic medical record    History of Present Illness:     Sathya Montilla is a [de-identified] y.o. Non- / non  male who presents with Chest Pain (onset 1hr ago. pt states had stents placed here a few weeks ago. )   and is admitted to the hospital for the management of Chest pain. Pt is an [de-identified] yr old middle [place] man with PMH CAD, HTN, and DM2 who presented with chest pain. He is status post CABG x2, HERACLIO to LCx in 2019, and again this May 6 2022. Patient follows with Dr. Gabby Rock. He did well post cardiac cath 2 weeks ago, with HERACLIO placed.   However he developed substernal chest pain last night at rest.  No EKG changes per notes. Patient was placed on IV heparin with troponins 17, 19, 21. Cardiology stopped the heparin drip and started the patient on Imdur. Apparently he was noncompliant with his insulin and blood pressure medications for the last several months prior to the admission on May 6. His blood sugar is elevated 200-3 100s this admission since his insulin 70/30 was not restarted. He has been chest pain-free throughout the day and wants to go home. No fevers, chills, sore throat, rhinorrhea, cough or shortness of breath. His wife is present. Past Medical History:     Past Medical History:   Diagnosis Date    Arthritis     CAD (coronary artery disease)     CHF (congestive heart failure) ([facility])     Diabetes mellitus ([address]     History of blood transfusion     Hyperlipidemia     Hypertension     PVC (premature ventricular contraction)     SOB (shortness of breath)     Thyroid disease         Past Surgical History:     Past Surgical History:   Procedure Laterality Date    CARDIAC SURGERY      COLONOSCOPY  10/5/2011    normal    COLONOSCOPY  3/7/2006    small internal hemorrhoids, diverticulosis    COLONOSCOPY  11/14/2001    small internal hemorrhoids, hyperplastic polyp    CORONARY ANGIOPLASTY WITH STENT PLACEMENT  05/06/2022    EYE SURGERY      Cataract    JOINT REPLACEMENT      Knee         Medications Prior to Admission:     Prior to Admission medications    Medication Sig Start Date End Date Taking?  Authorizing Provider   metoprolol succinate (TOPROL XL) 25 MG extended release tablet Take 25 mg by mouth daily   Yes Historical Provider, MD   ticagrelor (BRILINTA) 90 MG TABS tablet Take 90 mg by mouth 2 times daily   Yes Historical Provider, MD   Rosuvastatin Calcium 40 MG CPSP Take 40 mg by mouth daily     Historical Provider, MD   acetaminophen (TYLENOL) 500 MG tablet Take 2 tablets by mouth every 6 hours as needed for Pain 11/17/21   Georga Dakins, MD   Multiple Vitamins-Minerals (THERAPEUTIC MULTIVITAMIN-MINERALS) tablet Take 1 tablet by mouth daily 9/6/17   Pina Kebede MD   levothyroxine (SYNTHROID) 88 MCG tablet Take 1 tablet by mouth daily 1 tablet daily 9/6/17   Pina Kebede MD   vitamin D (CHOLECALCIFEROL) 1000 UNIT TABS tablet Take 1,000 Units by mouth daily    Historical Provider, MD   metFORMIN (GLUCOPHAGE) 1000 MG tablet Take 1,000 mg by mouth 2 times daily (with meals)     Historical Provider, MD   losartan (COZAAR) 100 MG tablet Take 100 mg by mouth daily    Historical Provider, MD   aspirin 81 MG chewable tablet Take 81 mg by mouth daily    Historical Provider, MD   Insulin NPH Isophane & Regular (HUMULIN;NOVOLIN) (70-30) 100 UNIT/ML injection pen Inject 30 Units into the skin three times daily [address] [place] [address] Provider, MD        Allergies:     Patient has no known allergies. Social History:     Tobacco:    reports that he quit smoking about 38 years ago. His smoking use included pipe. He started smoking about 47 years ago. He has a 4.50 pack-year smoking history. He has never used smokeless tobacco.  Alcohol:      reports current alcohol use. Drug Use:  reports no history of drug use. Family History:     Family History   Family history unknown: Yes       Review of Systems:     Positive and Negative as described in HPI.     CONSTITUTIONAL:  negative for fevers, chills, sweats, fatigue, weight loss  HEENT:  negative for vision, hearing changes, runny nose, throat pain  RESPIRATORY:  negative for shortness of breath, cough, congestion, wheezing  CARDIOVASCULAR: Positive for chest pain, no palpitations  GASTROINTESTINAL:  negative for nausea, vomiting, diarrhea, constipation, change in bowel habits, abdominal pain   GENITOURINARY:  negative for difficulty of urination, burning with urination, frequency   INTEGUMENT:  negative for rash, skin lesions, easy bruising   HEMATOLOGIC/LYMPHATIC:  negative for swelling/edema ALLERGIC/IMMUNOLOGIC:  negative for urticaria , itching  ENDOCRINE:  negative increase in drinking, increase in urination, hot or cold intolerance  MUSCULOSKELETAL:  negative joint pains, muscle aches, swelling of joints  NEUROLOGICAL:  negative for headaches, dizziness, lightheadedness, numbness, pain, tingling extremities  BEHAVIOR/PSYCH:  negative for depression, anxiety    Physical Exam:   BP (!) 95/57   Pulse 57   Temp 98.7 °F (37.1 °C) (Temporal)   Resp 16   Ht 5' 10\" (1.778 m)   Wt 180 lb (81.6 kg)   SpO2 97%   BMI 25.83 kg/m²   Temp (24hrs), Av °F (36.7 °C), Min:97.4 °F (36.3 °C), Max:98.7 °F (37.1 °C)    Recent Labs     22  0217 22  0733 22  1134 22  1559   POCGLU 317* 181* 285* 322*       Intake/Output Summary (Last 24 hours) at 2022 1641  Last data filed at 2022 1429  Gross per 24 hour   Intake 680 ml   Output 1000 ml   Net -320 ml       General Appearance:  alert, well appearing, and in no acute distress  Mental status: oriented to person, place, and time  Head:  normocephalic, atraumatic  Eye: no icterus, redness, pupils equal and reactive, extraocular eye movements intact, conjunctiva clear  Ear: normal external ear, no discharge, hearing intact  Nose:  no drainage noted  Mouth: mucous membranes moist  Neck: supple, no carotid bruits, thyroid not palpable  Lungs: Bilateral equal air entry, clear to auscultation, no wheezing, rales or rhonchi, normal effort  Cardiovascular: normal rate, regular rhythm, no murmur, gallop, rub.   Abdomen: Soft, nontender, nondistended, normal bowel sounds, no hepatomegaly or splenomegaly  Neurologic: There are no new focal motor or sensory deficits, normal muscle tone and bulk, no abnormal sensation, normal speech, cranial nerves II through XII grossly intact  Skin: No gross lesions, rashes, bruising or bleeding on exposed skin area  Extremities:  peripheral pulses palpable, no pedal edema or calf pain with palpation  Psych: normal affect     Investigations:      Laboratory Testing:  Recent Results (from the past 24 hour(s))   EKG 12 Lead    Collection Time: 05/24/22  7:19 PM   Result Value Ref Range    Ventricular Rate 60 BPM    Atrial Rate 60 BPM    P-R Interval 200 ms    QRS Duration 86 ms    Q-T Interval 416 ms    QTc Calculation (Bazett) 416 ms    P Axis 53 degrees    R Axis 1 degrees    T Axis 84 degrees   CBC with Auto Differential    Collection Time: 05/24/22  7:58 PM   Result Value Ref Range    WBC 6.5 3.5 - 11.3 k/uL    RBC 4.32 4.21 - 5.77 m/uL    Hemoglobin 13.4 13.0 - 17.0 g/dL    Hematocrit 41.2 40.7 - 50.3 %    MCV 95.4 82.6 - 102.9 fL    MCH 31.0 25.2 - 33.5 pg    MCHC 32.5 28.4 - 34.8 g/dL    RDW 12.8 11.8 - 14.4 %    Platelets 433 403 - 787 k/uL    MPV 9.7 8.1 - 13.5 fL    NRBC Automated 0.0 0.0 per 100 WBC    Seg Neutrophils 70 (H) 36 - 65 %    Lymphocytes 16 (L) 24 - 43 %    Monocytes 8 3 - 12 %    Eosinophils % 5 (H) 1 - 4 %    Basophils 1 0 - 2 %    Immature Granulocytes 0 0 %    Segs Absolute 4.53 1.50 - 8.10 k/uL    Absolute Lymph # 1.05 (L) 1.10 - 3.70 k/uL    Absolute Mono # 0.51 0.10 - 1.20 k/uL    Absolute Eos # 0.32 0.00 - 0.44 k/uL    Basophils Absolute 0.03 0.00 - 0.20 k/uL    Absolute Immature Granulocyte 0.02 0.00 - 0.30 k/uL   Comprehensive Metabolic Panel w/ Reflex to MG    Collection Time: 05/24/22  7:58 PM   Result Value Ref Range    Glucose 310 (H) 70 - 99 mg/dL    BUN 18 8 - 23 mg/dL    CREATININE 1.04 0.70 - 1.20 mg/dL    Bun/Cre Ratio 17 9 - 20    Calcium 9.2 8.6 - 10.4 mg/dL    Sodium 136 135 - 144 mmol/L    Potassium 4.7 3.7 - 5.3 mmol/L    Chloride 103 98 - 107 mmol/L    CO2 22 20 - 31 mmol/L    Anion Gap 11 9 - 17 mmol/L    Alkaline Phosphatase 64 40 - 129 U/L    ALT 27 5 - 41 U/L    AST 21 <40 U/L    Total Bilirubin 1.01 0.3 - 1.2 mg/dL    Total Protein 7.4 6.4 - 8.3 g/dL    Albumin 4.3 3.5 - 5.2 g/dL    GFR Non-African American >60 >60 mL/min    GFR African American >60 >60 mL/min    GFR Comment         Troponin    Collection Time: 05/24/22  7:58 PM   Result Value Ref Range    Troponin, High Sensitivity 17 0 - 22 ng/L   Lipase    Collection Time: 05/24/22  7:58 PM   Result Value Ref Range    Lipase 41 13 - 60 U/L   Brain Natriuretic Peptide    Collection Time: 05/24/22  7:58 PM   Result Value Ref Range    Pro- (H) <300 pg/mL   SPECIMEN REJECTION    Collection Time: 05/24/22  7:58 PM   Result Value Ref Range    Specimen Source . BLOOD     Ordered Test PT PTT     Reason for Rejection       Unable to perform testing: Specimen quantity not sufficient.    Protime-INR    Collection Time: 05/24/22  8:33 PM   Result Value Ref Range    Protime 14.2 11.5 - 14.2 sec    INR 1.1    APTT    Collection Time: 05/24/22  8:33 PM   Result Value Ref Range    PTT 32.2 23.9 - 33.8 sec   Troponin    Collection Time: 05/24/22 10:11 PM   Result Value Ref Range    Troponin, High Sensitivity 18 0 - 22 ng/L   POC Glucose Fingerstick    Collection Time: 05/24/22 10:35 PM   Result Value Ref Range    POC Glucose 240 (H) 75 - 110 mg/dL   Troponin    Collection Time: 05/25/22 12:48 AM   Result Value Ref Range    Troponin, High Sensitivity 19 0 - 22 ng/L   POC Glucose Fingerstick    Collection Time: 05/25/22  2:17 AM   Result Value Ref Range    POC Glucose 317 (H) 75 - 110 mg/dL   Troponin    Collection Time: 05/25/22  6:01 AM   Result Value Ref Range    Troponin, High Sensitivity 21 0 - 22 ng/L   CBC    Collection Time: 05/25/22  6:01 AM   Result Value Ref Range    WBC 7.3 3.5 - 11.3 k/uL    RBC 3.78 (L) 4.21 - 5.77 m/uL    Hemoglobin 11.8 (L) 13.0 - 17.0 g/dL    Hematocrit 35.9 (L) 40.7 - 50.3 %    MCV 95.0 82.6 - 102.9 fL    MCH 31.2 25.2 - 33.5 pg    MCHC 32.9 28.4 - 34.8 g/dL    RDW 12.9 11.8 - 14.4 %    Platelets 288 242 - 709 k/uL    MPV 9.5 8.1 - 13.5 fL    NRBC Automated 0.0 0.0 per 100 WBC   Comprehensive Metabolic Panel w/ Reflex to MG    Collection Time: 05/25/22  6:01 AM   Result Value Ref Range    Glucose 240 (H) 70 - 99 mg/dL    BUN 13 8 - 23 mg/dL    CREATININE 1.15 0.70 - 1.20 mg/dL    Bun/Cre Ratio 11 9 - 20    Calcium 8.5 (L) 8.6 - 10.4 mg/dL    Sodium 138 135 - 144 mmol/L    Potassium 4.0 3.7 - 5.3 mmol/L    Chloride 106 98 - 107 mmol/L    CO2 21 20 - 31 mmol/L    Anion Gap 11 9 - 17 mmol/L    Alkaline Phosphatase 50 40 - 129 U/L    ALT 18 5 - 41 U/L    AST 16 <40 U/L    Total Bilirubin 0.77 0.3 - 1.2 mg/dL    Total Protein 5.9 (L) 6.4 - 8.3 g/dL    Albumin 3.5 3.5 - 5.2 g/dL    GFR Non-African American >60 >60 mL/min    GFR African American >60 >60 mL/min    GFR Comment         Lipid Panel    Collection Time: 05/25/22  6:01 AM   Result Value Ref Range    Cholesterol 74 <200 mg/dL    HDL 42 >40 mg/dL    LDL Cholesterol 19 0 - 130 mg/dL    Chol/HDL Ratio 1.8 <5    Triglycerides 64 <150 mg/dL   Magnesium    Collection Time: 05/25/22  6:01 AM   Result Value Ref Range    Magnesium 1.5 (L) 1.6 - 2.6 mg/dL   Anti-Xa, Unfractionated Heparin    Collection Time: 05/25/22  6:01 AM   Result Value Ref Range    Anti-XA Unfrac Heparin 0.44 0.30 - 0.70 IU/L   APTT    Collection Time: 05/25/22  6:01 AM   Result Value Ref Range    .9 (HH) 23.9 - 33.8 sec   EKG 12 lead    Collection Time: 05/25/22  6:01 AM   Result Value Ref Range    Ventricular Rate 63 BPM    Atrial Rate 63 BPM    P-R Interval 216 ms    QRS Duration 86 ms    Q-T Interval 422 ms    QTc Calculation (Bazett) 431 ms    P Axis 48 degrees    R Axis -6 degrees    T Axis 101 degrees   POC Glucose Fingerstick    Collection Time: 05/25/22  7:33 AM   Result Value Ref Range    POC Glucose 181 (H) 75 - 110 mg/dL   POC Glucose Fingerstick    Collection Time: 05/25/22 11:34 AM   Result Value Ref Range    POC Glucose 285 (H) 75 - 110 mg/dL   POC Glucose Fingerstick    Collection Time: 05/25/22  3:59 PM   Result Value Ref Range    POC Glucose 322 (H) 75 - 110 mg/dL       Imaging/Diagnostics:  XR CHEST PORTABLE    Result Date: 5/24/2022  No acute process.        Assessment : Hospital Problems           Last Modified POA    * (Principal) Chest pain 5/24/2022 Yes    Essential hypertension 5/25/2022 Yes    Hyperlipidemia 5/25/2022 Yes    Type 2 diabetes mellitus without complication, with long-term current use of insulin ([facility] [address] 5/25/2022 Yes          Plan:     Patient status inpatient in the Cardiovascular ICU    1. Chest pain- Trop flat, Cardiology stopped the Heparin, and started patient on Imdur. Con't ASA, statin, BB, ARB  2. HTN- BP low normal, will reduce Losartan 50 mg daily,cont Metoprolol, Imdur started by Cardiology, monitor BP at home, dw patient's wife  3. DM2- Lantus 30 units, SSI right now, -300s, f/u with Endocrine outpatient  4. Hypothyroid- con't Synthroid  5. Hypomagnesemia- replace Magnesium 1.5  6. Gi/ DVT proph    Okay to DC home later today, check BGT 1 hour after dinner. Okay to go if BS < 300. Follow up with Cardiology in 1-2 weeks and PCP    Consultations:   [address] TO INTERNAL MEDICINE  IP CONSULT TO CARDIOLOGY  IP CONSULT TO CARDIOLOGY     Patient is admitted as inpatient status because of co-morbidities listed above, severity of signs and symptoms as outlined, requirement for current medical therapies and most importantly because of direct risk to patient if care not provided in a hospital setting. Expected length of stay > 48 hours.     Paola Bonds MD  5/25/2022  4:41 PM    Copy sent to Dr. Caryn Joel MD

## 2022-05-25 NOTE — PROGRESS NOTES
Transitions of Care Pharmacy Service   Medication Review    The patient's list of current home medications has been reviewed. Source(s) of information: spoke to patient and sure scripts     Based on information provided by the above source(s), I have updated the patient's home med list as described below. I changed or updated the following medications on the patient's home medication list:  Discontinued None      Added metoprolol succinate (TOPROL XL) 25 MG extended release tablet     Adjusted   Rosuvastatin Calcium 40 MG CPSP     Other Notes Patient stated that he has not been taking ticagrelor (BRILINTA) 90 MG TABS tablet. This mediation was last filled on 5/7/22 for 30 day supply. Patient last office visit on 05/10/22 show that he should still be taking this medication. Please feel free to call me with any questions about this encounter. Thank you. This note will be reviewed and co-signed by the Transitions of [facility] Pharmacist. The pharmacist will review inpatient orders and contact the physician about any discrepancies. Napoleon Rendon, pharmacy technician  Transitions of [facility] Pharmacy Service  Phone:  [phone]  Fax: [phone]      Electronically signed by Napoleon Rendon on 5/25/2022 at 1:45 PM         Prior to Admission medications    Medication Sig Start Date End Date Taking?  Authorizing Provider   metoprolol succinate (TOPROL XL) 25 MG extended release tablet Take 25 mg by mouth daily       ticagrelor (BRILINTA) 90 MG TABS tablet Take 90 mg by mouth 2 times daily       Rosuvastatin Calcium 40 MG CPSP Take 40 mg by mouth daily        acetaminophen (TYLENOL) 500 MG tablet Take 2 tablets by mouth every 6 hours as needed for Pain       Multiple Vitamins-Minerals (THERAPEUTIC MULTIVITAMIN-MINERALS) tablet Take 1 tablet by mouth daily       levothyroxine (SYNTHROID) 88 MCG tablet Take 1 tablet by mouth daily 1 tablet daily       vitamin D (CHOLECALCIFEROL) 1000 UNIT TABS tablet Take 1,000 Units by mouth daily       metFORMIN (GLUCOPHAGE) 1000 MG tablet Take 1,000 mg by mouth 2 times daily (with meals)        losartan (COZAAR) 100 MG tablet Take 100 mg by mouth daily       aspirin 81 MG chewable tablet Take 81 mg by mouth daily       Insulin NPH Isophane & Regular (HUMULIN;NOVOLIN) (70-30) 100 UNIT/ML injection pen Inject 30 Units into the skin three times daily per [facility] SCALE

## 2022-05-26 ENCOUNTER — CARE COORDINATION (OUTPATIENT)
Dept: CASE MANAGEMENT | Age: 82
End: 2022-05-26

## 2022-05-26 NOTE — CARE COORDINATION
[facility] Transitions Initial Follow Up Call    Call within 2 business days of discharge: Yes    Patient: Yumiko Ag Patient : 1940    MRN: [id number]  Reason for Admission: Chest pain    Discharge Date: 22 RARS: Readmission Risk Score: 12.9 ( )      Last Discharge [facility]       Complaint Diagnosis Description Type Department Provider    22 Chest Pain Chest pain, unspecified type ED to Hosp-Admission (Discharged) (ADMITTED) NNAMDI Lu MD; Isela Juarez. .. First attempt at initial 24 hour CTN call     Spoke with:  Called to speak with patient for initial  transition of care. Home number is not in service, wifes mobile number is not available and no way to leave message for call back CTN will attempt to call again at later time.    Facility: [facility]    Non-face-to-face services provided:  Obtained and reviewed discharge summary and/or continuity of care documents    Care Transitions 24 Hour Call    Do you have all of your prescriptions and are they filled?: Yes  Care Transitions Interventions         Follow Up  Future Appointments   Date Time Provider Annalee Larson   2022 12:00 PM [facility]  [address]   10/26/2022  1:40 PM Papo Smith MD [facility] Verdia Snellen, LPN

## 2022-05-26 NOTE — DISCHARGE SUMMARY
All discharge instructions given at this time as well as all patient belongings returned to patient. Pt denies any further questions regarding discharge at this time. Pt given also given discharge packet with unit letter, discharge instructions/restrictions and medication handouts regarding all discharge medications and side effects. Pt denies any further issues at this time. Pt wheeled out to front discharge doors at this time. Pt left premises without any issues in private vehicle at this time.

## 2022-05-27 ENCOUNTER — CARE COORDINATION (OUTPATIENT)
Dept: CASE MANAGEMENT | Age: 82
End: 2022-05-27

## 2022-05-27 NOTE — CARE COORDINATION
[facility] Transitions Initial Follow Up Call    Call within 2 business days of discharge: Yes    Patient: Rosy Ramirez Patient : 1940   MRN: [id number]  Reason for Admission:chest pain  Discharge Date: 22 RARS: Readmission Risk Score: 12.9 ( )      Last Discharge [address] [address]       Complaint Diagnosis Description Type Department Provider    22 Chest Pain Chest pain, unspecified type ED to Hosp-Admission (Discharged) (ADMITTED) NNAMDI Cox MD; Giovanna Mott. .. Second attempt at initial 24 hour CTN call     Spoke with:  Called to speak with patient for initial  transition of care.  Unable to leave message on either number both phones are unavailable noted patient does have all appropriate F/U visits scheduled  Facility: [address] [place] [address] services provided:  Obtained and reviewed discharge summary and/or continuity of care documents    Care Transitions 24 Hour Call    Do you have all of your prescriptions and are they filled?: Yes  Care Transitions Interventions         Follow Up  Future Appointments   Date Time Provider Annalee Larson   2022  9:10 AM MD Anayeli Nazario  [id number]   2022 12:00 PM [facility]  [address]   10/26/2022  1:40 PM MD Anayeli Nazario  Russel Cruz LPN

## 2022-06-08 ENCOUNTER — HOSPITAL ENCOUNTER (OUTPATIENT)
Dept: CARDIAC REHAB | Age: 82
Setting detail: THERAPIES SERIES
Discharge: HOME | End: 2022-06-08
Payer: MEDICARE

## 2022-06-08 VITALS
HEART RATE: 58 BPM | HEIGHT: 70 IN | BODY MASS INDEX: 25.07 KG/M2 | RESPIRATION RATE: 16 BRPM | WEIGHT: 175.1 LBS | OXYGEN SATURATION: 99 %

## 2022-06-08 PROCEDURE — 93798 PHYS/QHP OP CAR RHAB W/ECG: CPT

## 2022-06-08 ASSESSMENT — EJECTION FRACTION
EF_VALUE: 50
EF_VALUE: 50

## 2022-06-08 ASSESSMENT — PATIENT HEALTH QUESTIONNAIRE - PHQ9
1. LITTLE INTEREST OR PLEASURE IN DOING THINGS: 0
SUM OF ALL RESPONSES TO PHQ QUESTIONS 1-9: 0
SUM OF ALL RESPONSES TO PHQ QUESTIONS 1-9: 0
2. FEELING DOWN, DEPRESSED OR HOPELESS: 0
SUM OF ALL RESPONSES TO PHQ9 QUESTIONS 1 & 2: 0
SUM OF ALL RESPONSES TO PHQ QUESTIONS 1-9: 0
SUM OF ALL RESPONSES TO PHQ QUESTIONS 1-9: 0

## 2022-06-08 ASSESSMENT — EXERCISE STRESS TEST
PEAK_RPE: 9
PEAK_BP: 124/70
PEAK_HR: 70
PEAK_METS: 2.3

## 2022-06-10 ENCOUNTER — HOSPITAL ENCOUNTER (OUTPATIENT)
Dept: CARDIAC REHAB | Age: 82
Setting detail: THERAPIES SERIES
Discharge: HOME | End: 2022-06-10
Payer: MEDICARE

## 2022-06-13 ENCOUNTER — HOSPITAL ENCOUNTER (OUTPATIENT)
Dept: CARDIAC REHAB | Age: 82
Setting detail: THERAPIES SERIES
Discharge: HOME | End: 2022-06-13
Payer: MEDICARE

## 2022-06-13 VITALS — BODY MASS INDEX: 25.25 KG/M2 | WEIGHT: 176 LBS

## 2022-06-13 PROCEDURE — 93798 PHYS/QHP OP CAR RHAB W/ECG: CPT

## 2022-06-13 ASSESSMENT — EXERCISE STRESS TEST
PEAK_METS: 2.5
PEAK_HR: 92
PEAK_RPE: 11
PEAK_BP: 128/70

## 2022-06-15 ENCOUNTER — HOSPITAL ENCOUNTER (OUTPATIENT)
Dept: CARDIAC REHAB | Age: 82
Setting detail: THERAPIES SERIES
Discharge: HOME | End: 2022-06-15
Payer: MEDICARE

## 2022-06-15 ENCOUNTER — HOSPITAL ENCOUNTER (EMERGENCY)
Age: 82
Discharge: HOME | End: 2022-06-15
Attending: EMERGENCY MEDICINE
Payer: MEDICARE

## 2022-06-15 ENCOUNTER — APPOINTMENT (OUTPATIENT)
Dept: GENERAL RADIOLOGY | Age: 82
End: 2022-06-15
Payer: MEDICARE

## 2022-06-15 VITALS
TEMPERATURE: 97.9 F | DIASTOLIC BLOOD PRESSURE: 50 MMHG | HEART RATE: 59 BPM | BODY MASS INDEX: 25.2 KG/M2 | RESPIRATION RATE: 21 BRPM | OXYGEN SATURATION: 96 % | HEIGHT: 70 IN | SYSTOLIC BLOOD PRESSURE: 121 MMHG | WEIGHT: 176 LBS

## 2022-06-15 DIAGNOSIS — R07.89 CHEST WALL PAIN: Primary | ICD-10-CM

## 2022-06-15 LAB
ABSOLUTE EOS #: 0.31 K/UL (ref 0–0.44)
ABSOLUTE IMMATURE GRANULOCYTE: 0.03 K/UL (ref 0–0.3)
ABSOLUTE LYMPH #: 1.77 K/UL (ref 1.1–3.7)
ABSOLUTE MONO #: 0.68 K/UL (ref 0.1–1.2)
ANION GAP SERPL CALCULATED.3IONS-SCNC: 12 MMOL/L (ref 9–17)
BASOPHILS # BLD: 0 % (ref 0–2)
BASOPHILS ABSOLUTE: 0.03 K/UL (ref 0–0.2)
BUN BLDV-MCNC: 20 MG/DL (ref 8–23)
BUN/CREAT BLD: 14 (ref 9–20)
CALCIUM SERPL-MCNC: 9.4 MG/DL (ref 8.6–10.4)
CHLORIDE BLD-SCNC: 103 MMOL/L (ref 98–107)
CO2: 24 MMOL/L (ref 20–31)
CREAT SERPL-MCNC: 1.41 MG/DL (ref 0.7–1.2)
EKG ATRIAL RATE: 59 BPM
EKG P AXIS: 38 DEGREES
EKG P-R INTERVAL: 184 MS
EKG Q-T INTERVAL: 402 MS
EKG QRS DURATION: 90 MS
EKG QTC CALCULATION (BAZETT): 397 MS
EKG R AXIS: -5 DEGREES
EKG T AXIS: 82 DEGREES
EKG VENTRICULAR RATE: 59 BPM
EOSINOPHILS RELATIVE PERCENT: 3 % (ref 1–4)
GFR AFRICAN AMERICAN: 59 ML/MIN
GFR NON-AFRICAN AMERICAN: 48 ML/MIN
GFR SERPL CREATININE-BSD FRML MDRD: ABNORMAL ML/MIN/{1.73_M2}
GLUCOSE BLD-MCNC: 75 MG/DL (ref 70–99)
HCT VFR BLD CALC: 39.9 % (ref 40.7–50.3)
HEMOGLOBIN: 13 G/DL (ref 13–17)
IMMATURE GRANULOCYTES: 0 %
LYMPHOCYTES # BLD: 19 % (ref 24–43)
MCH RBC QN AUTO: 30.8 PG (ref 25.2–33.5)
MCHC RBC AUTO-ENTMCNC: 32.6 G/DL (ref 28.4–34.8)
MCV RBC AUTO: 94.5 FL (ref 82.6–102.9)
MONOCYTES # BLD: 7 % (ref 3–12)
MYOGLOBIN: 37 NG/ML (ref 28–72)
MYOGLOBIN: 43 NG/ML (ref 28–72)
NRBC AUTOMATED: 0 PER 100 WBC
PDW BLD-RTO: 13.3 % (ref 11.8–14.4)
PLATELET # BLD: 195 K/UL (ref 138–453)
PMV BLD AUTO: 9.2 FL (ref 8.1–13.5)
POTASSIUM SERPL-SCNC: 4.3 MMOL/L (ref 3.7–5.3)
RBC # BLD: 4.22 M/UL (ref 4.21–5.77)
SEG NEUTROPHILS: 71 % (ref 36–65)
SEGMENTED NEUTROPHILS ABSOLUTE COUNT: 6.44 K/UL (ref 1.5–8.1)
SODIUM BLD-SCNC: 139 MMOL/L (ref 135–144)
TROPONIN, HIGH SENSITIVITY: 21 NG/L (ref 0–22)
TROPONIN, HIGH SENSITIVITY: 24 NG/L (ref 0–22)
WBC # BLD: 9.3 K/UL (ref 3.5–11.3)

## 2022-06-15 PROCEDURE — 83874 ASSAY OF MYOGLOBIN: CPT

## 2022-06-15 PROCEDURE — 85025 COMPLETE CBC W/AUTO DIFF WBC: CPT

## 2022-06-15 PROCEDURE — 99285 EMERGENCY DEPT VISIT HI MDM: CPT

## 2022-06-15 PROCEDURE — 6370000000 HC RX 637 (ALT 250 FOR IP): Performed by: EMERGENCY MEDICINE

## 2022-06-15 PROCEDURE — 84484 ASSAY OF TROPONIN QUANT: CPT

## 2022-06-15 PROCEDURE — 80048 BASIC METABOLIC PNL TOTAL CA: CPT

## 2022-06-15 PROCEDURE — 71045 X-RAY EXAM CHEST 1 VIEW: CPT

## 2022-06-15 PROCEDURE — 93005 ELECTROCARDIOGRAM TRACING: CPT | Performed by: EMERGENCY MEDICINE

## 2022-06-15 RX ORDER — ASPIRIN 81 MG/1
324 TABLET, CHEWABLE ORAL ONCE
Status: COMPLETED | OUTPATIENT
Start: 2022-06-15 | End: 2022-06-15

## 2022-06-15 RX ORDER — NITROGLYCERIN 0.4 MG/1
0.4 TABLET SUBLINGUAL EVERY 5 MIN PRN
Status: COMPLETED | OUTPATIENT
Start: 2022-06-15 | End: 2022-06-15

## 2022-06-15 RX ADMIN — ASPIRIN 81 MG CHEWABLE TABLET 324 MG: 81 TABLET CHEWABLE at 02:10

## 2022-06-15 RX ADMIN — Medication 0.4 MG: at 02:21

## 2022-06-15 RX ADMIN — Medication 0.4 MG: at 02:16

## 2022-06-15 RX ADMIN — Medication 0.4 MG: at 02:11

## 2022-06-15 ASSESSMENT — PAIN SCALES - GENERAL
PAINLEVEL_OUTOF10: 7
PAINLEVEL_OUTOF10: 4
PAINLEVEL_OUTOF10: 7
PAINLEVEL_OUTOF10: 6

## 2022-06-15 ASSESSMENT — ENCOUNTER SYMPTOMS
COUGH: 0
SHORTNESS OF BREATH: 0
ABDOMINAL PAIN: 0
DIARRHEA: 0
CONSTIPATION: 0
COLOR CHANGE: 0
EYE REDNESS: 0
EYE DISCHARGE: 0
FACIAL SWELLING: 0
VOMITING: 0

## 2022-06-15 ASSESSMENT — PAIN - FUNCTIONAL ASSESSMENT: PAIN_FUNCTIONAL_ASSESSMENT: 0-10

## 2022-06-15 NOTE — ED PROVIDER NOTES
[address] [place] [address] ED  EMERGENCY DEPARTMENT ENCOUNTER      Pt Name: Jyotsna Spann  MRN: [id number]  [place] 1940  Date of evaluation: 6/15/2022  Provider: Dalila Randall MD    CHIEF COMPLAINT       Chief Complaint   Patient presents with    Chest Pain     center of chest, onset 1 hr ago, hx double bypass         HISTORY OF PRESENT ILLNESS  (Location/Symptom, Timing/Onset, Context/Setting, Quality, Duration, Modifying Factors, Severity.)   Jyotsna Spann is a [de-identified] y.o. male who presents to the emergency department for chest pain. It started about 1 hour ago. In the remote past he had double bypass and then he states a month or 2 ago he had at least 1 stent put in. The pain has been continuous for the last hour or so. He points in the middle part of his chest and describes it as a heaviness and he rated it as a 7. No fever cough or shortness of breath or back pain. 4:56 AM  After his wife arrived she told me that he was not have any pain until he vomited and she thinks he may have strained his chest.      Nursing Notes were reviewed. ALLERGIES     Patient has no known allergies.     CURRENT MEDICATIONS       Previous Medications    ACETAMINOPHEN (TYLENOL) 500 MG TABLET    Take 2 tablets by mouth every 6 hours as needed for Pain    ASPIRIN 81 MG CHEWABLE TABLET    Take 81 mg by mouth daily    INSULIN NPH ISOPHANE & REGULAR (HUMULIN;NOVOLIN) (70-30) 100 UNIT PER ML INJECTION PEN    Inject 30 Units into the skin 2 times daily (before meals) SLIDING SCALE    ISOSORBIDE MONONITRATE (IMDUR) 30 MG EXTENDED RELEASE TABLET    Take 1 tablet by mouth daily    LEVOTHYROXINE (SYNTHROID) 88 MCG TABLET    Take 1 tablet by mouth daily 1 tablet daily    LOSARTAN (COZAAR) 50 MG TABLET    Take 1 tablet by mouth daily    METFORMIN (GLUCOPHAGE) 1000 MG TABLET    Take 1,000 mg by mouth 2 times daily (with meals)     METOPROLOL SUCCINATE (TOPROL XL) 25 MG EXTENDED RELEASE TABLET    Take 25 mg by mouth daily    MULTIPLE VITAMINS-MINERALS (THERAPEUTIC MULTIVITAMIN-MINERALS) TABLET    Take 1 tablet by mouth daily    ROSUVASTATIN CALCIUM 40 MG CPSP    Take 40 mg by mouth daily     TICAGRELOR (BRILINTA) 90 MG TABS TABLET    Take 90 mg by mouth 2 times daily    VITAMIN D (CHOLECALCIFEROL) 1000 UNIT TABS TABLET    Take 1,000 Units by mouth daily       PAST MEDICAL HISTORY         Diagnosis Date    Arthritis     CAD (coronary artery disease)     CHF (congestive heart failure) (HCC)     Diabetes mellitus ([address]     History of blood transfusion     Hyperlipidemia     Hypertension     PVC (premature ventricular contraction)     SOB (shortness of breath)     Thyroid disease        SURGICAL HISTORY           Procedure Laterality Date    CARDIAC SURGERY      COLONOSCOPY  10/5/2011    normal    COLONOSCOPY  3/7/2006    small internal hemorrhoids, diverticulosis    COLONOSCOPY  11/14/2001    small internal hemorrhoids, hyperplastic polyp    CORONARY ANGIOPLASTY WITH STENT PLACEMENT  05/06/2022    EYE SURGERY      Cataract    JOINT REPLACEMENT      Knee          FAMILY HISTORY           Family history unknown: Yes     No family status information on file. SOCIAL HISTORY      reports that he quit smoking about 38 years ago. His smoking use included pipe. He started smoking about 47 years ago. He has a 4.50 pack-year smoking history. He has never used smokeless tobacco. He reports current alcohol use. He reports that he does not use drugs. REVIEW OF SYSTEMS    (2-9 systems for level 4, 10 or more for level 5)     Review of Systems   Constitutional: Negative for chills, fatigue and fever. HENT: Negative for congestion, ear discharge and facial swelling. Eyes: Negative for discharge and redness. Respiratory: Negative for cough and shortness of breath. Cardiovascular: Positive for chest pain. Gastrointestinal: Negative for abdominal pain, constipation, diarrhea and vomiting.    Genitourinary: Negative for dysuria and hematuria. Musculoskeletal: Negative for arthralgias. Skin: Negative for color change and rash. Neurological: Negative for syncope, numbness and headaches. Hematological: Negative for adenopathy. Psychiatric/Behavioral: Negative for confusion. The patient is not nervous/anxious. Except as noted above the remainder of the review of systems was reviewed and negative. PHYSICAL EXAM    (up to 7 for level 4, 8 or more for level 5)     Vitals:    06/15/22 0222 06/15/22 0230 06/15/22 0245 06/15/22 0315   BP: 139/60 123/61 (!) 133/52 (!) 121/50   Pulse: 69 65 58 59   Resp: 16 21 17 21   Temp:       TempSrc:       SpO2: 95% 95% 99% 96%   Weight:       Height:           Physical Exam  Vitals reviewed. Constitutional:       General: He is not in acute distress. Appearance: He is well-developed. He is not diaphoretic. HENT:      Head: Normocephalic and atraumatic. Eyes:      General: No scleral icterus. Right eye: No discharge. Left eye: No discharge. Cardiovascular:      Rate and Rhythm: Normal rate and regular rhythm. Pulmonary:      Effort: Pulmonary effort is normal. No respiratory distress. Breath sounds: Normal breath sounds. No stridor. No wheezing or rales. Abdominal:      General: There is no distension. Palpations: Abdomen is soft. Tenderness: There is no abdominal tenderness. Musculoskeletal:         General: Normal range of motion. Cervical back: Neck supple. Lymphadenopathy:      Cervical: No cervical adenopathy. Skin:     General: Skin is warm and dry. Findings: No erythema or rash. Neurological:      Mental Status: He is alert and oriented to person, place, and time.    Psychiatric:         Behavior: Behavior normal.             DIAGNOSTIC RESULTS     EKG: All EKG's are interpreted by the Emergency Department Physician who either signs or Co-signs this chart in the absence of a cardiologist.    EKG on my interpretation shows sinus rhythm with a rate of 59 and no acute changes    RADIOLOGY:   Non-plain film images such as CT, Ultrasound and MRI are read by the radiologist. Plain radiographic images are visualized and preliminarily interpreted by the emergency physician with the below findings:    Interpretation per the Radiologist below, if available at the time of this note:    XR CHEST PORTABLE    Result Date: 6/15/2022  EXAMINATION: [address] 6/15/2022 2:14 am COMPARISON: 05/24/2022 HISTORY: ORDERING SYSTEM PROVIDED HISTORY: Chest Pain TECHNOLOGIST PROVIDED HISTORY: Chest Pain FINDINGS: Normal heart size and pulmonary vasculature. Status post CABG. Lungs are clear. No pneumothorax or pleural effusion. Lung volumes are low. Surrounding structures are unremarkable. No acute cardiopulmonary abnormality evident. Status post CABG. Lung volumes are quite low. LABS:  Labs Reviewed   CBC WITH AUTO DIFFERENTIAL - Abnormal; Notable for the following components:       Result Value    Hematocrit 39.9 (*)     Seg Neutrophils 71 (*)     Lymphocytes 19 (*)     All other components within normal limits   BASIC METABOLIC PANEL - Abnormal; Notable for the following components:    CREATININE 1.41 (*)     GFR Non- 48 (*)     GFR  59 (*)     All other components within normal limits   TROP/MYOGLOBIN - Abnormal; Notable for the following components:    Troponin, High Sensitivity 24 (*)     All other components within normal limits   TROP/MYOGLOBIN       All other labs were within normal range or not returned as of this dictation.     EMERGENCY DEPARTMENT COURSE and DIFFERENTIAL DIAGNOSIS/MDM:   Vitals:    Vitals:    06/15/22 0222 06/15/22 0230 06/15/22 0245 06/15/22 0315   BP: 139/60 123/61 (!) 133/52 (!) 121/50   Pulse: 69 65 58 59   Resp: 16 21 17 21   Temp:       TempSrc:       SpO2: 95% 95% 99% 96%   Weight:       Height:           Orders Placed This Encounter   Medications    nitroGLYCERIN (NITROSTAT) SL tablet 0.4 mg    aspirin chewable tablet 324 mg       Medical Decision Makin sets of cardiac markers are essentially unchanged and are negative. He is symptom-free now and is able to be discharged home. The rest of his work-up is negative as well. Treatment diagnosis and follow-up were discussed with the patient and his wife. CONSULTS:  None    PROCEDURES:  None    FINAL IMPRESSION      1. Chest wall pain          DISPOSITION/PLAN   DISPOSITION Decision To Discharge 06/15/2022 04:54:56 AM      PATIENT REFERRED TO:   Wm Tan MD  [facility] [address] ED  [address] [facility]  [phone]    If symptoms worsen      DISCHARGE MEDICATIONS:     New Prescriptions    No medications on file       The care is provided during an unprecedented national emergency due to the novel coronavirus, COVID-19.     (Please note that portions of this note were completed with a voice recognition program.  Efforts were made to edit the dictations but occasionally words are mis-transcribed.)    Rigoberto Benoit MD  Attending Emergency Physician            Rigoberto Benoit MD  06/15/22 2858

## 2022-06-15 NOTE — PROGRESS NOTES
PHONED PT, [address] FORGOT TO CALL US. WIFE STATES HE WAS IN THE [facility] ER LAST NIGHT FOR CHEST HEAVINESS AND WAS DISCHARGED AT 4AM. WILL RESCHEDULE.

## 2022-06-15 NOTE — ED NOTES
Pt presents to the ER for centralized chest heaviness. Pt states it started several hours prior to arrival and did not get any better. Pt states the pain is continuous. Pt denies any SOB, trouble breathing. Pt states he recently had several stents placed.  Pt rates pain 8/10 on arrival. Denies any nausea, any back pain      Bryce Cagle RN  06/15/22 9067

## 2022-06-17 ENCOUNTER — HOSPITAL ENCOUNTER (OUTPATIENT)
Dept: CARDIAC REHAB | Age: 82
Setting detail: THERAPIES SERIES
Discharge: HOME | End: 2022-06-17
Payer: MEDICARE

## 2022-06-17 VITALS — WEIGHT: 178 LBS | BODY MASS INDEX: 25.54 KG/M2

## 2022-06-17 PROCEDURE — 93798 PHYS/QHP OP CAR RHAB W/ECG: CPT

## 2022-06-17 ASSESSMENT — EXERCISE STRESS TEST
PEAK_BP: 110/54
PEAK_METS: 2.8
PEAK_HR: 75
PEAK_RPE: 10

## 2022-06-20 ENCOUNTER — HOSPITAL ENCOUNTER (OUTPATIENT)
Dept: CARDIAC REHAB | Age: 82
Setting detail: THERAPIES SERIES
Discharge: HOME | End: 2022-06-20
Payer: MEDICARE

## 2022-06-20 VITALS — WEIGHT: 176.31 LBS | BODY MASS INDEX: 25.3 KG/M2

## 2022-06-20 PROCEDURE — 93798 PHYS/QHP OP CAR RHAB W/ECG: CPT

## 2022-06-20 ASSESSMENT — EXERCISE STRESS TEST
PEAK_BP: 116/48
PEAK_HR: 74
PEAK_METS: 2.8
PEAK_RPE: 12

## 2022-06-22 ENCOUNTER — HOSPITAL ENCOUNTER (OUTPATIENT)
Dept: CARDIAC REHAB | Age: 82
Setting detail: THERAPIES SERIES
Discharge: HOME | End: 2022-06-22
Payer: MEDICARE

## 2022-06-22 VITALS — BODY MASS INDEX: 25.25 KG/M2 | WEIGHT: 176 LBS

## 2022-06-22 PROCEDURE — 93798 PHYS/QHP OP CAR RHAB W/ECG: CPT

## 2022-06-22 ASSESSMENT — EXERCISE STRESS TEST
PEAK_RPE: 12
PEAK_BP: 152/66
PEAK_METS: 2.9
PEAK_HR: 68

## 2022-06-24 ENCOUNTER — HOSPITAL ENCOUNTER (OUTPATIENT)
Dept: CARDIAC REHAB | Age: 82
Setting detail: THERAPIES SERIES
Discharge: HOME | End: 2022-06-24
Payer: MEDICARE

## 2022-06-24 VITALS — BODY MASS INDEX: 24.97 KG/M2 | WEIGHT: 174 LBS

## 2022-06-24 PROCEDURE — 93798 PHYS/QHP OP CAR RHAB W/ECG: CPT

## 2022-06-24 ASSESSMENT — EXERCISE STRESS TEST
PEAK_HR: 68
PEAK_BP: 110/62
PEAK_METS: 2.9
PEAK_RPE: 12

## 2022-06-24 NOTE — PROGRESS NOTES
PT NO CALL NO SHOW, CALLED CELL # AND WIFE STATES HE IS ALREADY HERE AT [facility] [address] BUT PROBABLY FORGOT TO COME TO CARDIAC REHAB. WILL RESCHEDULE.

## 2022-06-27 ENCOUNTER — HOSPITAL ENCOUNTER (OUTPATIENT)
Dept: CARDIAC REHAB | Age: 82
Setting detail: THERAPIES SERIES
Discharge: HOME | End: 2022-06-27
Payer: MEDICARE

## 2022-06-27 VITALS — BODY MASS INDEX: 24.74 KG/M2 | WEIGHT: 172.4 LBS

## 2022-06-27 PROCEDURE — 93798 PHYS/QHP OP CAR RHAB W/ECG: CPT

## 2022-06-27 ASSESSMENT — EXERCISE STRESS TEST
PEAK_HR: 70
PEAK_METS: 2.9
PEAK_RPE: 12
PEAK_BP: 110/64

## 2022-06-28 ENCOUNTER — HOSPITAL ENCOUNTER (OUTPATIENT)
Age: 82
Discharge: HOME | End: 2022-06-28
Payer: MEDICARE

## 2022-06-28 LAB
ALBUMIN SERPL-MCNC: 3.9 G/DL (ref 3.5–5.2)
ALP BLD-CCNC: 67 U/L (ref 40–129)
ALT SERPL-CCNC: 61 U/L (ref 5–41)
AST SERPL-CCNC: 47 U/L
BILIRUB SERPL-MCNC: 0.93 MG/DL (ref 0.3–1.2)
BILIRUBIN DIRECT: 0.24 MG/DL
BILIRUBIN, INDIRECT: 0.69 MG/DL (ref 0–1)
CHOLESTEROL/HDL RATIO: 2
CHOLESTEROL: 89 MG/DL
HDLC SERPL-MCNC: 45 MG/DL
LDL CHOLESTEROL: 29 MG/DL (ref 0–130)
TOTAL PROTEIN: 7.1 G/DL (ref 6.4–8.3)
TRIGL SERPL-MCNC: 73 MG/DL

## 2022-06-28 PROCEDURE — 36415 COLL VENOUS BLD VENIPUNCTURE: CPT

## 2022-06-28 PROCEDURE — 80061 LIPID PANEL: CPT

## 2022-06-28 PROCEDURE — 80076 HEPATIC FUNCTION PANEL: CPT

## 2022-06-29 ENCOUNTER — HOSPITAL ENCOUNTER (OUTPATIENT)
Dept: CARDIAC REHAB | Age: 82
Setting detail: THERAPIES SERIES
Discharge: HOME | End: 2022-06-29
Payer: MEDICARE

## 2022-07-01 ENCOUNTER — HOSPITAL ENCOUNTER (OUTPATIENT)
Dept: CARDIAC REHAB | Age: 82
Setting detail: THERAPIES SERIES
Discharge: HOME | End: 2022-07-01
Payer: MEDICARE

## 2022-07-01 VITALS — WEIGHT: 173 LBS | BODY MASS INDEX: 24.82 KG/M2

## 2022-07-01 PROCEDURE — 93798 PHYS/QHP OP CAR RHAB W/ECG: CPT

## 2022-07-01 ASSESSMENT — EXERCISE STRESS TEST
PEAK_METS: 2.3
PEAK_RPE: 13
PEAK_BP: 120/60
PEAK_HR: 61

## 2022-07-06 ENCOUNTER — HOSPITAL ENCOUNTER (OUTPATIENT)
Dept: CARDIAC REHAB | Age: 82
Setting detail: THERAPIES SERIES
Discharge: HOME | End: 2022-07-06
Payer: MEDICARE

## 2022-07-06 VITALS — WEIGHT: 173 LBS | BODY MASS INDEX: 24.82 KG/M2

## 2022-07-06 PROCEDURE — 93798 PHYS/QHP OP CAR RHAB W/ECG: CPT

## 2022-07-06 ASSESSMENT — EXERCISE STRESS TEST
PEAK_METS: 2.9
PEAK_BP: 106/56
PEAK_BP: 106/56
PEAK_HR: 72
PEAK_RPE: 11

## 2022-07-06 ASSESSMENT — EJECTION FRACTION: EF_VALUE: 50

## 2022-07-08 ENCOUNTER — HOSPITAL ENCOUNTER (OUTPATIENT)
Dept: CARDIAC REHAB | Age: 82
Setting detail: THERAPIES SERIES
Discharge: HOME | End: 2022-07-08
Payer: MEDICARE

## 2022-07-08 VITALS — BODY MASS INDEX: 24.69 KG/M2 | WEIGHT: 172.1 LBS

## 2022-07-08 PROCEDURE — 93798 PHYS/QHP OP CAR RHAB W/ECG: CPT

## 2022-07-08 ASSESSMENT — EXERCISE STRESS TEST
PEAK_BP: 100/50
PEAK_METS: 2.9
PEAK_RPE: 12
PEAK_HR: 83

## 2022-07-11 ENCOUNTER — HOSPITAL ENCOUNTER (OUTPATIENT)
Dept: CARDIAC REHAB | Age: 82
Setting detail: THERAPIES SERIES
Discharge: HOME | End: 2022-07-11
Payer: MEDICARE

## 2022-07-11 VITALS — BODY MASS INDEX: 24.97 KG/M2 | WEIGHT: 174 LBS

## 2022-07-11 PROCEDURE — 93798 PHYS/QHP OP CAR RHAB W/ECG: CPT

## 2022-07-11 ASSESSMENT — EXERCISE STRESS TEST
PEAK_HR: 85
PEAK_BP: 126/60
PEAK_METS: 3
PEAK_RPE: 12

## 2022-07-13 ENCOUNTER — HOSPITAL ENCOUNTER (OUTPATIENT)
Dept: CARDIAC REHAB | Age: 82
Setting detail: THERAPIES SERIES
Discharge: HOME | End: 2022-07-13
Payer: MEDICARE

## 2022-07-13 VITALS — BODY MASS INDEX: 24.97 KG/M2 | WEIGHT: 174 LBS

## 2022-07-13 PROCEDURE — 93798 PHYS/QHP OP CAR RHAB W/ECG: CPT

## 2022-07-13 ASSESSMENT — EXERCISE STRESS TEST
PEAK_METS: 3
PEAK_RPE: 10
PEAK_HR: 88
PEAK_BP: 120/70

## 2022-07-15 ENCOUNTER — HOSPITAL ENCOUNTER (OUTPATIENT)
Dept: CARDIAC REHAB | Age: 82
Setting detail: THERAPIES SERIES
Discharge: HOME | End: 2022-07-15
Payer: MEDICARE

## 2022-07-15 VITALS — BODY MASS INDEX: 24.82 KG/M2 | WEIGHT: 173 LBS

## 2022-07-15 PROCEDURE — 93798 PHYS/QHP OP CAR RHAB W/ECG: CPT

## 2022-07-15 ASSESSMENT — EXERCISE STRESS TEST
PEAK_HR: 78
PEAK_RPE: 10
PEAK_METS: 3
PEAK_BP: 120/60

## 2022-07-18 ENCOUNTER — HOSPITAL ENCOUNTER (OUTPATIENT)
Dept: CARDIAC REHAB | Age: 82
Setting detail: THERAPIES SERIES
Discharge: HOME | End: 2022-07-18
Payer: MEDICARE

## 2022-07-18 VITALS — WEIGHT: 174 LBS | BODY MASS INDEX: 24.97 KG/M2

## 2022-07-18 PROCEDURE — 93798 PHYS/QHP OP CAR RHAB W/ECG: CPT

## 2022-07-18 ASSESSMENT — EXERCISE STRESS TEST
PEAK_BP: 110/60
PEAK_RPE: 11
PEAK_METS: 3
PEAK_HR: 87

## 2022-07-20 ENCOUNTER — HOSPITAL ENCOUNTER (OUTPATIENT)
Dept: CARDIAC REHAB | Age: 82
Setting detail: THERAPIES SERIES
Discharge: HOME | End: 2022-07-20
Payer: MEDICARE

## 2022-07-20 VITALS — WEIGHT: 174 LBS | BODY MASS INDEX: 24.97 KG/M2

## 2022-07-20 PROCEDURE — 93798 PHYS/QHP OP CAR RHAB W/ECG: CPT

## 2022-07-20 ASSESSMENT — EXERCISE STRESS TEST
PEAK_BP: 118/52
PEAK_METS: 3
PEAK_HR: 88
PEAK_RPE: 12

## 2022-07-22 ENCOUNTER — HOSPITAL ENCOUNTER (OUTPATIENT)
Dept: CARDIAC REHAB | Age: 82
Setting detail: THERAPIES SERIES
Discharge: HOME | End: 2022-07-22
Payer: MEDICARE

## 2022-07-22 VITALS — BODY MASS INDEX: 24.68 KG/M2 | WEIGHT: 172 LBS

## 2022-07-22 PROCEDURE — 93798 PHYS/QHP OP CAR RHAB W/ECG: CPT

## 2022-07-22 ASSESSMENT — EXERCISE STRESS TEST
PEAK_METS: 2.3
PEAK_BP: 118/70
PEAK_RPE: 12
PEAK_HR: 70

## 2022-07-25 ENCOUNTER — HOSPITAL ENCOUNTER (OUTPATIENT)
Dept: CARDIAC REHAB | Age: 82
Setting detail: THERAPIES SERIES
Discharge: HOME | End: 2022-07-25
Payer: MEDICARE

## 2022-07-25 ENCOUNTER — HOSPITAL ENCOUNTER (OUTPATIENT)
Age: 82
Discharge: HOME | End: 2022-07-25
Payer: MEDICARE

## 2022-07-25 LAB
ALBUMIN SERPL-MCNC: 3.9 G/DL (ref 3.5–5.2)
ALP BLD-CCNC: 61 U/L (ref 40–129)
ALT SERPL-CCNC: 23 U/L (ref 5–41)
AST SERPL-CCNC: 19 U/L
BILIRUB SERPL-MCNC: 1.04 MG/DL (ref 0.3–1.2)
BILIRUBIN DIRECT: 0.26 MG/DL
BILIRUBIN, INDIRECT: 0.78 MG/DL (ref 0–1)
CHOLESTEROL/HDL RATIO: 2.2
CHOLESTEROL: 97 MG/DL
HDLC SERPL-MCNC: 44 MG/DL
LDL CHOLESTEROL: 30 MG/DL (ref 0–130)
TOTAL PROTEIN: 6.9 G/DL (ref 6.4–8.3)
TRIGL SERPL-MCNC: 115 MG/DL

## 2022-07-25 PROCEDURE — 80061 LIPID PANEL: CPT

## 2022-07-25 PROCEDURE — 36415 COLL VENOUS BLD VENIPUNCTURE: CPT

## 2022-07-25 PROCEDURE — 80076 HEPATIC FUNCTION PANEL: CPT

## 2022-07-25 NOTE — PROGRESS NOTES
Cardiac Rehab     Patient no call/ no show for Cardiac Rehab this am.  Writer spoke with spouse via phone and offered that patient could come in at 10am and he declined. Writer rescheduled patient.     Electronically signed by Guerita Yuen RN on 7/25/2022 at 8:03 AM

## 2022-07-27 ENCOUNTER — HOSPITAL ENCOUNTER (OUTPATIENT)
Dept: CARDIAC REHAB | Age: 82
Setting detail: THERAPIES SERIES
Discharge: HOME | End: 2022-07-27
Payer: MEDICARE

## 2022-07-29 ENCOUNTER — HOSPITAL ENCOUNTER (OUTPATIENT)
Dept: CARDIAC REHAB | Age: 82
Setting detail: THERAPIES SERIES
Discharge: HOME | End: 2022-07-29
Payer: MEDICARE

## 2022-07-29 VITALS — WEIGHT: 174.3 LBS | BODY MASS INDEX: 25.01 KG/M2

## 2022-07-29 PROCEDURE — 93798 PHYS/QHP OP CAR RHAB W/ECG: CPT

## 2022-07-29 ASSESSMENT — EXERCISE STRESS TEST
PEAK_RPE: 13
PEAK_BP: 118/70
PEAK_METS: 2.3
PEAK_HR: 82

## 2022-08-01 ENCOUNTER — HOSPITAL ENCOUNTER (OUTPATIENT)
Dept: CARDIAC REHAB | Age: 82
Setting detail: THERAPIES SERIES
Discharge: HOME | End: 2022-08-01
Payer: MEDICARE

## 2022-08-01 VITALS — BODY MASS INDEX: 24.82 KG/M2 | WEIGHT: 173 LBS

## 2022-08-01 PROCEDURE — 93798 PHYS/QHP OP CAR RHAB W/ECG: CPT

## 2022-08-01 ASSESSMENT — EXERCISE STRESS TEST
PEAK_METS: 3.2
PEAK_HR: 83
PEAK_RPE: 13
PEAK_BP: 138/50

## 2022-08-03 ENCOUNTER — HOSPITAL ENCOUNTER (OUTPATIENT)
Dept: CARDIAC REHAB | Age: 82
Setting detail: THERAPIES SERIES
Discharge: HOME | End: 2022-08-03
Payer: MEDICARE

## 2022-08-03 VITALS — WEIGHT: 174.3 LBS | BODY MASS INDEX: 25.01 KG/M2

## 2022-08-03 PROCEDURE — 93798 PHYS/QHP OP CAR RHAB W/ECG: CPT

## 2022-08-03 ASSESSMENT — EXERCISE STRESS TEST
PEAK_BP: 130/82
PEAK_BP: 130/82
PEAK_HR: 77
PEAK_RPE: 13
PEAK_METS: 3.2

## 2022-08-03 ASSESSMENT — EJECTION FRACTION: EF_VALUE: 50

## 2022-08-05 ENCOUNTER — HOSPITAL ENCOUNTER (OUTPATIENT)
Dept: CARDIAC REHAB | Age: 82
Setting detail: THERAPIES SERIES
Discharge: HOME | End: 2022-08-05
Payer: MEDICARE

## 2022-08-05 VITALS — BODY MASS INDEX: 25.25 KG/M2 | WEIGHT: 176 LBS

## 2022-08-05 PROCEDURE — 93798 PHYS/QHP OP CAR RHAB W/ECG: CPT

## 2022-08-05 ASSESSMENT — EXERCISE STRESS TEST
PEAK_BP: 112/58
PEAK_METS: 3.3
PEAK_HR: 73
PEAK_RPE: 13

## 2022-08-08 ENCOUNTER — HOSPITAL ENCOUNTER (OUTPATIENT)
Dept: CARDIAC REHAB | Age: 82
Setting detail: THERAPIES SERIES
Discharge: HOME | End: 2022-08-08
Payer: MEDICARE

## 2022-08-08 VITALS — BODY MASS INDEX: 25.27 KG/M2 | WEIGHT: 176.1 LBS

## 2022-08-08 PROCEDURE — 93798 PHYS/QHP OP CAR RHAB W/ECG: CPT

## 2022-08-08 ASSESSMENT — EXERCISE STRESS TEST
PEAK_METS: 3.3
PEAK_RPE: 13
PEAK_HR: 86
PEAK_BP: 110/54

## 2022-08-10 ENCOUNTER — HOSPITAL ENCOUNTER (OUTPATIENT)
Dept: CARDIAC REHAB | Age: 82
Setting detail: THERAPIES SERIES
Discharge: HOME | End: 2022-08-10
Payer: MEDICARE

## 2022-08-12 ENCOUNTER — HOSPITAL ENCOUNTER (OUTPATIENT)
Dept: CARDIAC REHAB | Age: 82
Setting detail: THERAPIES SERIES
Discharge: HOME | End: 2022-08-12
Payer: MEDICARE

## 2022-08-12 VITALS — WEIGHT: 174.9 LBS | BODY MASS INDEX: 25.1 KG/M2

## 2022-08-12 PROCEDURE — 93798 PHYS/QHP OP CAR RHAB W/ECG: CPT

## 2022-08-12 ASSESSMENT — EXERCISE STRESS TEST
PEAK_RPE: 14
PEAK_METS: 3.2
PEAK_BP: 106/62
PEAK_HR: 68

## 2022-08-15 ENCOUNTER — HOSPITAL ENCOUNTER (OUTPATIENT)
Dept: CARDIAC REHAB | Age: 82
Setting detail: THERAPIES SERIES
Discharge: HOME | End: 2022-08-15
Payer: MEDICARE

## 2022-08-15 VITALS — WEIGHT: 174 LBS | BODY MASS INDEX: 24.97 KG/M2

## 2022-08-15 PROCEDURE — 93798 PHYS/QHP OP CAR RHAB W/ECG: CPT

## 2022-08-15 ASSESSMENT — EXERCISE STRESS TEST
PEAK_METS: 3.3
PEAK_RPE: 12
PEAK_HR: 80
PEAK_BP: 132/60

## 2022-08-17 ENCOUNTER — HOSPITAL ENCOUNTER (OUTPATIENT)
Dept: CARDIAC REHAB | Age: 82
Setting detail: THERAPIES SERIES
Discharge: HOME | End: 2022-08-17
Payer: MEDICARE

## 2022-08-17 VITALS — WEIGHT: 173.8 LBS | BODY MASS INDEX: 24.94 KG/M2

## 2022-08-17 PROCEDURE — 93798 PHYS/QHP OP CAR RHAB W/ECG: CPT

## 2022-08-17 ASSESSMENT — EXERCISE STRESS TEST
PEAK_RPE: 13
PEAK_HR: 77
PEAK_METS: 3.3
PEAK_BP: 118/60

## 2022-08-19 ENCOUNTER — HOSPITAL ENCOUNTER (OUTPATIENT)
Dept: CARDIAC REHAB | Age: 82
Setting detail: THERAPIES SERIES
Discharge: HOME | End: 2022-08-19
Payer: MEDICARE

## 2022-08-19 VITALS — WEIGHT: 172 LBS | BODY MASS INDEX: 24.68 KG/M2

## 2022-08-19 PROCEDURE — 93798 PHYS/QHP OP CAR RHAB W/ECG: CPT

## 2022-08-19 ASSESSMENT — EXERCISE STRESS TEST
PEAK_METS: 3.3
PEAK_HR: 80
PEAK_BP: 120/56
PEAK_RPE: 12

## 2022-08-22 ENCOUNTER — HOSPITAL ENCOUNTER (OUTPATIENT)
Dept: CARDIAC REHAB | Age: 82
Setting detail: THERAPIES SERIES
Discharge: HOME | End: 2022-08-22
Payer: MEDICARE

## 2022-08-22 VITALS — WEIGHT: 173 LBS | BODY MASS INDEX: 24.82 KG/M2

## 2022-08-22 PROCEDURE — 93798 PHYS/QHP OP CAR RHAB W/ECG: CPT

## 2022-08-22 ASSESSMENT — EXERCISE STRESS TEST
PEAK_RPE: 12
PEAK_BP: 116/64
PEAK_METS: 3.3
PEAK_HR: 74

## 2022-08-24 ENCOUNTER — HOSPITAL ENCOUNTER (OUTPATIENT)
Dept: CARDIAC REHAB | Age: 82
Setting detail: THERAPIES SERIES
Discharge: HOME | End: 2022-08-24
Payer: MEDICARE

## 2022-08-24 VITALS — BODY MASS INDEX: 24.97 KG/M2 | WEIGHT: 174 LBS

## 2022-08-24 PROCEDURE — 93798 PHYS/QHP OP CAR RHAB W/ECG: CPT

## 2022-08-24 ASSESSMENT — EXERCISE STRESS TEST
PEAK_METS: 3.7
PEAK_BP: 118/64
PEAK_HR: 85
PEAK_RPE: 13

## 2022-08-26 ENCOUNTER — HOSPITAL ENCOUNTER (OUTPATIENT)
Dept: CARDIAC REHAB | Age: 82
Setting detail: THERAPIES SERIES
Discharge: HOME | End: 2022-08-26
Payer: MEDICARE

## 2022-08-26 VITALS — WEIGHT: 172 LBS | BODY MASS INDEX: 24.68 KG/M2

## 2022-08-26 PROCEDURE — 93798 PHYS/QHP OP CAR RHAB W/ECG: CPT

## 2022-08-26 ASSESSMENT — EXERCISE STRESS TEST
PEAK_HR: 76
PEAK_BP: 120/70
PEAK_RPE: 13
PEAK_METS: 3.7

## 2022-08-29 ENCOUNTER — HOSPITAL ENCOUNTER (OUTPATIENT)
Dept: CARDIAC REHAB | Age: 82
Setting detail: THERAPIES SERIES
Discharge: HOME | End: 2022-08-29
Payer: MEDICARE

## 2022-08-29 VITALS — BODY MASS INDEX: 24.68 KG/M2 | WEIGHT: 172 LBS

## 2022-08-29 PROCEDURE — 93798 PHYS/QHP OP CAR RHAB W/ECG: CPT

## 2022-08-29 ASSESSMENT — EXERCISE STRESS TEST
PEAK_RPE: 13
PEAK_METS: 3.7
PEAK_HR: 100

## 2022-08-31 ENCOUNTER — HOSPITAL ENCOUNTER (OUTPATIENT)
Dept: CARDIAC REHAB | Age: 82
Setting detail: THERAPIES SERIES
Discharge: HOME | End: 2022-08-31
Payer: MEDICARE

## 2022-08-31 VITALS — BODY MASS INDEX: 24.81 KG/M2 | WEIGHT: 172.9 LBS

## 2022-08-31 PROCEDURE — 93798 PHYS/QHP OP CAR RHAB W/ECG: CPT

## 2022-08-31 ASSESSMENT — EXERCISE STRESS TEST
PEAK_BP: 100/58
PEAK_METS: 3.7
PEAK_RPE: 13
PEAK_BP: 100/58
PEAK_HR: 88

## 2022-08-31 ASSESSMENT — EJECTION FRACTION: EF_VALUE: 50

## 2022-09-02 ENCOUNTER — HOSPITAL ENCOUNTER (OUTPATIENT)
Dept: CARDIAC REHAB | Age: 82
Setting detail: THERAPIES SERIES
Discharge: HOME | End: 2022-09-02
Payer: MEDICARE

## 2022-09-02 VITALS — BODY MASS INDEX: 24.68 KG/M2 | WEIGHT: 172 LBS

## 2022-09-02 PROCEDURE — 93798 PHYS/QHP OP CAR RHAB W/ECG: CPT

## 2022-09-02 ASSESSMENT — EXERCISE STRESS TEST
PEAK_RPE: 14
PEAK_BP: 104/64
PEAK_HR: 109
PEAK_METS: 3.7

## 2022-09-05 ENCOUNTER — HOSPITAL ENCOUNTER (OUTPATIENT)
Dept: CARDIAC REHAB | Age: 82
Setting detail: THERAPIES SERIES
Discharge: HOME | End: 2022-09-05
Payer: MEDICARE

## 2022-09-07 ENCOUNTER — HOSPITAL ENCOUNTER (OUTPATIENT)
Dept: CARDIAC REHAB | Age: 82
Setting detail: THERAPIES SERIES
Discharge: HOME | End: 2022-09-07
Payer: MEDICARE

## 2022-09-07 VITALS — BODY MASS INDEX: 24.68 KG/M2 | WEIGHT: 172 LBS

## 2022-09-07 PROCEDURE — 93798 PHYS/QHP OP CAR RHAB W/ECG: CPT

## 2022-09-07 ASSESSMENT — EXERCISE STRESS TEST
PEAK_METS: 3.8
PEAK_RPE: 13
PEAK_HR: 103
PEAK_BP: 138/70

## 2022-09-09 ENCOUNTER — APPOINTMENT (OUTPATIENT)
Dept: CARDIAC REHAB | Age: 82
End: 2022-09-09
Payer: MEDICARE

## 2022-09-09 ENCOUNTER — HOSPITAL ENCOUNTER (OUTPATIENT)
Dept: CARDIAC REHAB | Age: 82
Setting detail: THERAPIES SERIES
Discharge: HOME | End: 2022-09-09
Payer: MEDICARE

## 2022-09-12 ENCOUNTER — HOSPITAL ENCOUNTER (OUTPATIENT)
Dept: CARDIAC REHAB | Age: 82
Setting detail: THERAPIES SERIES
Discharge: HOME | End: 2022-09-12
Payer: MEDICARE

## 2022-09-12 VITALS — WEIGHT: 173 LBS | BODY MASS INDEX: 24.82 KG/M2

## 2022-09-12 PROCEDURE — 93798 PHYS/QHP OP CAR RHAB W/ECG: CPT

## 2022-09-12 ASSESSMENT — EXERCISE STRESS TEST
PEAK_HR: 102
PEAK_BP: 136/78
PEAK_RPE: 13
PEAK_METS: 3.8

## 2022-09-14 ENCOUNTER — HOSPITAL ENCOUNTER (OUTPATIENT)
Dept: CARDIAC REHAB | Age: 82
Setting detail: THERAPIES SERIES
Discharge: HOME | End: 2022-09-14
Payer: MEDICARE

## 2022-09-14 NOTE — PROGRESS NOTES
Pt's wife called states patient will not be at Cardiac Rehab today, states he was throwing up all night, states he is ok now but weak, encouraged wife to seek advice from PCP.

## 2022-09-16 ENCOUNTER — HOSPITAL ENCOUNTER (OUTPATIENT)
Dept: CARDIAC REHAB | Age: 82
Setting detail: THERAPIES SERIES
Discharge: HOME | End: 2022-09-16
Payer: MEDICARE

## 2022-09-16 VITALS — BODY MASS INDEX: 24.97 KG/M2 | WEIGHT: 174 LBS

## 2022-09-16 PROCEDURE — 93798 PHYS/QHP OP CAR RHAB W/ECG: CPT

## 2022-09-16 ASSESSMENT — EXERCISE STRESS TEST
PEAK_RPE: 14
PEAK_HR: 104
PEAK_BP: 106/76
PEAK_METS: 3.8

## 2022-09-19 ENCOUNTER — HOSPITAL ENCOUNTER (OUTPATIENT)
Dept: CARDIAC REHAB | Age: 82
Setting detail: THERAPIES SERIES
Discharge: HOME | End: 2022-09-19
Payer: MEDICARE

## 2022-09-19 VITALS — BODY MASS INDEX: 24.58 KG/M2 | WEIGHT: 171.3 LBS

## 2022-09-19 PROCEDURE — 93798 PHYS/QHP OP CAR RHAB W/ECG: CPT

## 2022-09-19 ASSESSMENT — EXERCISE STRESS TEST
PEAK_HR: 106
PEAK_METS: 3.8
PEAK_RPE: 13
PEAK_BP: 128/68

## 2022-09-21 ENCOUNTER — HOSPITAL ENCOUNTER (OUTPATIENT)
Dept: CARDIAC REHAB | Age: 82
Setting detail: THERAPIES SERIES
Discharge: HOME | End: 2022-09-21
Payer: MEDICARE

## 2022-09-21 VITALS — WEIGHT: 170.4 LBS | BODY MASS INDEX: 24.45 KG/M2

## 2022-09-21 PROCEDURE — 93798 PHYS/QHP OP CAR RHAB W/ECG: CPT

## 2022-09-21 ASSESSMENT — PATIENT HEALTH QUESTIONNAIRE - PHQ9
SUM OF ALL RESPONSES TO PHQ9 QUESTIONS 1 & 2: 0
6. FEELING BAD ABOUT YOURSELF - OR THAT YOU ARE A FAILURE OR HAVE LET YOURSELF OR YOUR FAMILY DOWN: 0
SUM OF ALL RESPONSES TO PHQ QUESTIONS 1-9: 0
8. MOVING OR SPEAKING SO SLOWLY THAT OTHER PEOPLE COULD HAVE NOTICED. OR THE OPPOSITE, BEING SO FIGETY OR RESTLESS THAT YOU HAVE BEEN MOVING AROUND A LOT MORE THAN USUAL: 0
1. LITTLE INTEREST OR PLEASURE IN DOING THINGS: 0
10. IF YOU CHECKED OFF ANY PROBLEMS, HOW DIFFICULT HAVE THESE PROBLEMS MADE IT FOR YOU TO DO YOUR WORK, TAKE CARE OF THINGS AT HOME, OR GET ALONG WITH OTHER PEOPLE: 0
SUM OF ALL RESPONSES TO PHQ QUESTIONS 1-9: 0
7. TROUBLE CONCENTRATING ON THINGS, SUCH AS READING THE NEWSPAPER OR WATCHING TELEVISION: 0
SUM OF ALL RESPONSES TO PHQ QUESTIONS 1-9: 0
SUM OF ALL RESPONSES TO PHQ QUESTIONS 1-9: 0
9. THOUGHTS THAT YOU WOULD BE BETTER OFF DEAD, OR OF HURTING YOURSELF: 0
3. TROUBLE FALLING OR STAYING ASLEEP: 0
4. FEELING TIRED OR HAVING LITTLE ENERGY: 0
5. POOR APPETITE OR OVEREATING: 0
2. FEELING DOWN, DEPRESSED OR HOPELESS: 0

## 2022-09-21 ASSESSMENT — EXERCISE STRESS TEST
PEAK_METS: 3.8
PEAK_BP: 120/76
PEAK_RPE: 13
PEAK_HR: 84
PEAK_BP: 120/76

## 2022-09-21 ASSESSMENT — EJECTION FRACTION: EF_VALUE: 50

## 2022-09-22 PROBLEM — N18.30 CHRONIC RENAL DISEASE, STAGE III (HCC): Status: ACTIVE | Noted: 2022-09-22

## 2022-11-02 ENCOUNTER — APPOINTMENT (OUTPATIENT)
Dept: GENERAL RADIOLOGY | Age: 82
End: 2022-11-02
Payer: MEDICARE

## 2022-11-02 ENCOUNTER — HOSPITAL ENCOUNTER (EMERGENCY)
Age: 82
Discharge: HOME | End: 2022-11-02
Attending: EMERGENCY MEDICINE
Payer: MEDICARE

## 2022-11-02 VITALS
DIASTOLIC BLOOD PRESSURE: 56 MMHG | WEIGHT: 180 LBS | BODY MASS INDEX: 28.93 KG/M2 | TEMPERATURE: 97.5 F | HEIGHT: 66 IN | RESPIRATION RATE: 23 BRPM | SYSTOLIC BLOOD PRESSURE: 166 MMHG | HEART RATE: 66 BPM | OXYGEN SATURATION: 94 %

## 2022-11-02 DIAGNOSIS — R07.89 CHEST WALL PAIN: Primary | ICD-10-CM

## 2022-11-02 LAB
ABSOLUTE EOS #: 0.39 K/UL (ref 0–0.44)
ABSOLUTE IMMATURE GRANULOCYTE: 0.03 K/UL (ref 0–0.3)
ABSOLUTE LYMPH #: 1.59 K/UL (ref 1.1–3.7)
ABSOLUTE MONO #: 0.59 K/UL (ref 0.1–1.2)
ALBUMIN SERPL-MCNC: 3.9 G/DL (ref 3.5–5.2)
ALP BLD-CCNC: 70 U/L (ref 40–129)
ALT SERPL-CCNC: 28 U/L (ref 5–41)
ANION GAP SERPL CALCULATED.3IONS-SCNC: 13 MMOL/L (ref 9–17)
AST SERPL-CCNC: 22 U/L
BASOPHILS # BLD: 0 % (ref 0–2)
BASOPHILS ABSOLUTE: 0.03 K/UL (ref 0–0.2)
BILIRUB SERPL-MCNC: 0.9 MG/DL (ref 0.3–1.2)
BUN BLDV-MCNC: 17 MG/DL (ref 8–23)
BUN/CREAT BLD: 15 (ref 9–20)
CALCIUM SERPL-MCNC: 9.8 MG/DL (ref 8.6–10.4)
CHLORIDE BLD-SCNC: 101 MMOL/L (ref 98–107)
CO2: 21 MMOL/L (ref 20–31)
CREAT SERPL-MCNC: 1.15 MG/DL (ref 0.7–1.2)
EOSINOPHILS RELATIVE PERCENT: 5 % (ref 1–4)
GFR SERPL CREATININE-BSD FRML MDRD: >60 ML/MIN/1.73M2
GLUCOSE BLD-MCNC: 168 MG/DL (ref 70–99)
HCT VFR BLD CALC: 41.4 % (ref 40.7–50.3)
HEMOGLOBIN: 13.3 G/DL (ref 13–17)
IMMATURE GRANULOCYTES: 0 %
LYMPHOCYTES # BLD: 19 % (ref 24–43)
MCH RBC QN AUTO: 30.6 PG (ref 25.2–33.5)
MCHC RBC AUTO-ENTMCNC: 32.1 G/DL (ref 28.4–34.8)
MCV RBC AUTO: 95.2 FL (ref 82.6–102.9)
MONOCYTES # BLD: 7 % (ref 3–12)
MYOGLOBIN: 35 NG/ML (ref 28–72)
NRBC AUTOMATED: 0 PER 100 WBC
PDW BLD-RTO: 13.6 % (ref 11.8–14.4)
PLATELET # BLD: 187 K/UL (ref 138–453)
PMV BLD AUTO: 9.6 FL (ref 8.1–13.5)
POTASSIUM SERPL-SCNC: 4.4 MMOL/L (ref 3.7–5.3)
PRO-BNP: 1154 PG/ML
RBC # BLD: 4.35 M/UL (ref 4.21–5.77)
SEG NEUTROPHILS: 69 % (ref 36–65)
SEGMENTED NEUTROPHILS ABSOLUTE COUNT: 5.56 K/UL (ref 1.5–8.1)
SODIUM BLD-SCNC: 135 MMOL/L (ref 135–144)
TOTAL PROTEIN: 7.7 G/DL (ref 6.4–8.3)
TROPONIN, HIGH SENSITIVITY: 20 NG/L (ref 0–22)
TROPONIN, HIGH SENSITIVITY: 24 NG/L (ref 0–22)
WBC # BLD: 8.2 K/UL (ref 3.5–11.3)

## 2022-11-02 PROCEDURE — 80053 COMPREHEN METABOLIC PANEL: CPT

## 2022-11-02 PROCEDURE — 99285 EMERGENCY DEPT VISIT HI MDM: CPT

## 2022-11-02 PROCEDURE — 84484 ASSAY OF TROPONIN QUANT: CPT

## 2022-11-02 PROCEDURE — 93005 ELECTROCARDIOGRAM TRACING: CPT | Performed by: EMERGENCY MEDICINE

## 2022-11-02 PROCEDURE — 85025 COMPLETE CBC W/AUTO DIFF WBC: CPT

## 2022-11-02 PROCEDURE — 83880 ASSAY OF NATRIURETIC PEPTIDE: CPT

## 2022-11-02 PROCEDURE — 83874 ASSAY OF MYOGLOBIN: CPT

## 2022-11-02 PROCEDURE — 71045 X-RAY EXAM CHEST 1 VIEW: CPT

## 2022-11-02 ASSESSMENT — PAIN - FUNCTIONAL ASSESSMENT: PAIN_FUNCTIONAL_ASSESSMENT: 0-10

## 2022-11-02 ASSESSMENT — PAIN SCALES - GENERAL: PAINLEVEL_OUTOF10: 8

## 2022-11-02 ASSESSMENT — HEART SCORE: ECG: 0

## 2022-11-02 NOTE — DISCHARGE INSTRUCTIONS
Return to this emergency room immediately if your symptoms persist, worsen or if new ones form. Make sure you follow-up with your cardiologist and your primary care doctor within the next 1-2 business days.

## 2022-11-02 NOTE — ED PROVIDER NOTES
EMERGENCY DEPARTMENT ENCOUNTER    Pt Name: Sesar Shin  MRN: [id number]  [place] 1940  Date of evaluation: 11/2/22  CHIEF COMPLAINT       Chief Complaint   Patient presents with    Chest Pain     HISTORY OF PRESENT ILLNESS   Patient is an 33-year-old male with PMH of CAD, CHF and hypertension who presents to the ED with chest wall pain that started last night. Pain is central located, nonradiating, nonexertional, nonpositional.  Pain has been improving and is nearly resolved. Pain not associated vomiting or diaphoresis. He has had similar symptoms in the past with negative work-ups. No reports of fever, cough, abdominal pain.       REVIEW OF SYSTEMS     Review of Systems  PASTMEDICAL HISTORY     Past Medical History:   Diagnosis Date    Arthritis     CAD (coronary artery disease)     CHF (congestive heart failure) (HCC)     Diabetes mellitus ([address]     History of blood transfusion     Hyperlipidemia     Hypertension     PVC (premature ventricular contraction)     SOB (shortness of breath)     Thyroid disease      SURGICAL HISTORY       Past Surgical History:   Procedure Laterality Date    CARDIAC SURGERY      COLONOSCOPY  10/5/2011    normal    COLONOSCOPY  3/7/2006    small internal hemorrhoids, diverticulosis    COLONOSCOPY  11/14/2001    small internal hemorrhoids, hyperplastic polyp    CORONARY ANGIOPLASTY WITH STENT PLACEMENT  05/06/2022    EYE SURGERY      Cataract    JOINT REPLACEMENT      Knee      CURRENT MEDICATIONS       Discharge Medication List as of 11/2/2022  5:52 AM        CONTINUE these medications which have NOT CHANGED    Details   isosorbide mononitrate (IMDUR) 30 MG extended release tablet Take 1 tablet by mouth once daily, Disp-90 tablet, R-0Normal      magnesium oxide (MAG-OX) 400 MG tablet Take 400 mg by mouth 2 times dailyHistorical Med      losartan (COZAAR) 50 MG tablet Take 1 tablet by mouth daily, Disp-90 tablet, R-0Normal      metoprolol succinate (TOPROL XL) 25 MG extended release tablet Take 25 mg by mouth dailyHistorical Med      ticagrelor (BRILINTA) 90 MG TABS tablet Take 90 mg by mouth 2 times dailyHistorical Med      Insulin NPH Isophane & Regular (HUMULIN;NOVOLIN) (70-30) 100 UNIT per ML injection pen Inject 30 Units into the skin 2 times daily (before meals) SLIDING SCALE, Disp-5 pen, R-3NO PRINT      Rosuvastatin Calcium 40 MG CPSP Take 40 mg by mouth daily Historical Med      acetaminophen (TYLENOL) 500 MG tablet Take 2 tablets by mouth every 6 hours as needed for Pain, Disp-60 tablet, R-0Normal      Multiple Vitamins-Minerals (THERAPEUTIC MULTIVITAMIN-MINERALS) tablet Take 1 tablet by mouth daily, Disp-30 tablet, R-5Normal      levothyroxine (SYNTHROID) 88 MCG tablet Take 1 tablet by mouth daily 1 tablet daily, Disp-30 tablet, R-5NO PRINT      vitamin D (CHOLECALCIFEROL) 1000 UNIT TABS tablet Take 1,000 Units by mouth daily      metFORMIN (GLUCOPHAGE) 1000 MG tablet Take 1,000 mg by mouth 2 times daily (with meals) Historical Med      aspirin 81 MG chewable tablet Take 81 mg by mouth daily           ALLERGIES     has No Known Allergies. FAMILY HISTORY     has no family status information on file. SOCIAL HISTORY       Social History     Tobacco Use    Smoking status: Former     Packs/day: 0.50     Years: 9.00     Pack years: 4.50     Types: Pipe, Cigarettes     Start date: 1974     Quit date: 1983     Years since quittin.1    Smokeless tobacco: Never   Vaping Use    Vaping Use: Never used   Substance Use Topics    Alcohol use: Yes     Comment: Social     Drug use: No     PHYSICAL EXAM     INITIAL VITALS: BP (!) 166/56   Pulse 66   Temp 97.5 °F (36.4 °C) (Oral)   Resp 23   Ht 5' 6\" (1.676 m)   Wt 180 lb (81.6 kg)   SpO2 94%   BMI 29.05 kg/m²    Physical Exam  Constitutional:       Appearance: Normal appearance. HENT:      Head: Normocephalic.       Right Ear: External ear normal.      Left Ear: External ear normal.      Nose: Nose normal.   Eyes: Conjunctiva/sclera: Conjunctivae normal.   Cardiovascular:      Rate and Rhythm: Normal rate and regular rhythm. Heart sounds: Normal heart sounds. Pulmonary:      Effort: Pulmonary effort is normal.      Breath sounds: Normal breath sounds. Abdominal:      General: Abdomen is flat. Musculoskeletal:      Cervical back: No muscular tenderness. Skin:     General: Skin is dry. Neurological:      Mental Status: He is alert. Mental status is at baseline. Psychiatric:         Mood and Affect: Mood normal.         Behavior: Behavior normal.       MEDICAL DECISION MAKING:   The patient is hemodynamically stable, afebrile, nontoxic-appearing. Physical exam remarkable. Based on history and exam most likely acid reflux, esophageal spasm. Less likely ACS. ED plan for basic labs, EKG, she will troponins, chest x-ray, reassess  Heart Score: 5    ED Course as of 11/05/22 0823   Wed Nov 02, 2022   0217 #EKG reviewed: Normal sinus rhythm, no ST elevations. Heart rate 68, , QRS 92, QTc 427. [DUSTIN]   0116 Updated patient and wife of lab results. Second troponin still pending. [DUSTIN]      ED Course User Index  [DUSTIN] Ayla Treadwell MD     CRITICAL CARE:     The 30 ml/kg fluid bolus is not ordered due to concern for fluid overload and/or heart failure. PROCEDURES:    Procedures    DIAGNOSTIC RESULTS   EKG:All EKG's are interpreted by the Emergency Department Physician who either signs or Co-signs this chart in the absence of a cardiologist.        RADIOLOGY:All plain film, CT, MRI, and formal ultrasound images (except ED bedside ultrasound) are read by the radiologist, see reports below, unless otherwisenoted in MDM or here. XR CHEST PORTABLE   Final Result   No significant finding in the chest.           LABS: All lab results were reviewed by myself, and all abnormals are listed below.   Labs Reviewed   COMPREHENSIVE METABOLIC PANEL W/ REFLEX TO MG FOR LOW K - Abnormal; Notable for the following components:       Result Value    Glucose 168 (*)     All other components within normal limits   CBC WITH AUTO DIFFERENTIAL - Abnormal; Notable for the following components:    Seg Neutrophils 69 (*)     Lymphocytes 19 (*)     Eosinophils % 5 (*)     All other components within normal limits   BRAIN NATRIURETIC PEPTIDE - Abnormal; Notable for the following components:    Pro-BNP 1,154 (*)     All other components within normal limits   TROPONIN - Abnormal; Notable for the following components:    Troponin, High Sensitivity 24 (*)     All other components within normal limits   TROP/MYOGLOBIN       EMERGENCY DEPARTMENTCOURSE:   Patient did well in the ED. Serial troponins essentially negative. Labs reviewed:  Potassium 4.4  Creatinine 1.15  Glucose 168  Troponin 24, 20  BNP 1154    EKG nonischemic    Chest x-ray negative for infiltrate. Patient feels well on reexamination, chest pain-free. No further work-up indicated at this time. Nursing notes reviewed. At this time this is what I find, the patient appears well and does not appear sick or toxic. I gave my usual and customary discussion of the risks and benefits of discharge versus admission. I answered the patient's questions. I gave the patient strict return precautions. Patient expressed understanding of the discharge instructions. Vitals:    Vitals:    11/02/22 0127 11/02/22 0131 11/02/22 0146 11/02/22 0200   BP:  (!) 171/75  (!) 166/56   Pulse: 66   66   Resp: 13   23   Temp:   97.5 °F (36.4 °C)    TempSrc:   Oral    SpO2: 98%   94%   Weight: 180 lb (81.6 kg)      Height: 5' 6\" (1.676 m)          The patient was given the following medications while in the emergency department:  No orders of the defined types were placed in this encounter. CONSULTS:  None    FINAL IMPRESSION      1.  Chest wall pain          DISPOSITION/PLAN   DISPOSITION Decision To Discharge 11/02/2022 05:47:22 AM      PATIENT REFERRED TO:  Arianne Acosta, MD  [address] [facility]  [address] [facility],[address]  [address] [facility]  [phone]    In 2 days    DISCHARGE MEDICATIONS:  Discharge Medication List as of 11/2/2022  5:52 AM        Miriam Burden MD  Attending Emergency Physician                    Pietro Christian MD  11/05/22 4590

## 2022-11-04 LAB
EKG ATRIAL RATE: 68 BPM
EKG P AXIS: 8 DEGREES
EKG P-R INTERVAL: 176 MS
EKG Q-T INTERVAL: 402 MS
EKG QRS DURATION: 92 MS
EKG QTC CALCULATION (BAZETT): 427 MS
EKG R AXIS: -21 DEGREES
EKG T AXIS: 83 DEGREES
EKG VENTRICULAR RATE: 68 BPM

## 2022-11-04 PROCEDURE — 93010 ELECTROCARDIOGRAM REPORT: CPT | Performed by: INTERNAL MEDICINE

## 2024-01-01 ENCOUNTER — APPOINTMENT (OUTPATIENT)
Dept: CT IMAGING | Age: 84
End: 2024-01-01
Payer: MEDICARE

## 2024-01-01 ENCOUNTER — APPOINTMENT (OUTPATIENT)
Dept: GENERAL RADIOLOGY | Age: 84
End: 2024-01-01
Payer: MEDICARE

## 2024-01-01 ENCOUNTER — HOSPITAL ENCOUNTER (EMERGENCY)
Age: 84
Discharge: HOME | End: 2024-01-01
Attending: EMERGENCY MEDICINE
Payer: MEDICARE

## 2024-01-01 VITALS
BODY MASS INDEX: 22.66 KG/M2 | HEIGHT: 66 IN | OXYGEN SATURATION: 97 % | SYSTOLIC BLOOD PRESSURE: 145 MMHG | RESPIRATION RATE: 17 BRPM | DIASTOLIC BLOOD PRESSURE: 80 MMHG | TEMPERATURE: 97.9 F | HEART RATE: 64 BPM | WEIGHT: 141 LBS

## 2024-01-01 DIAGNOSIS — K80.50 CALCULUS OF BILE DUCT WITHOUT CHOLECYSTITIS AND WITHOUT OBSTRUCTION: Primary | ICD-10-CM

## 2024-01-01 LAB
ALBUMIN SERPL-MCNC: 3.9 G/DL (ref 3.5–5.2)
ALP SERPL-CCNC: 62 U/L (ref 40–129)
ALT SERPL-CCNC: 25 U/L (ref 5–41)
AMYLASE SERPL-CCNC: 77 U/L (ref 28–100)
ANION GAP SERPL CALCULATED.3IONS-SCNC: 12 MMOL/L (ref 9–17)
AST SERPL-CCNC: 24 U/L
BASOPHILS # BLD: <0.03 K/UL (ref 0–0.2)
BASOPHILS NFR BLD: 0 % (ref 0–2)
BILIRUB DIRECT SERPL-MCNC: 0.2 MG/DL
BILIRUB INDIRECT SERPL-MCNC: 0.5 MG/DL (ref 0–1)
BILIRUB SERPL-MCNC: 0.7 MG/DL (ref 0.3–1.2)
BNP SERPL-MCNC: 1546 PG/ML
BUN SERPL-MCNC: 20 MG/DL (ref 8–23)
BUN/CREAT SERPL: 18 (ref 9–20)
CALCIUM SERPL-MCNC: 9.1 MG/DL (ref 8.6–10.4)
CHLORIDE SERPL-SCNC: 105 MMOL/L (ref 98–107)
CO2 SERPL-SCNC: 23 MMOL/L (ref 20–31)
CREAT SERPL-MCNC: 1.1 MG/DL (ref 0.7–1.2)
EOSINOPHIL # BLD: 0.16 K/UL (ref 0–0.44)
EOSINOPHILS RELATIVE PERCENT: 2 % (ref 1–4)
ERYTHROCYTE [DISTWIDTH] IN BLOOD BY AUTOMATED COUNT: 14 % (ref 11.8–14.4)
GFR SERPL CREATININE-BSD FRML MDRD: >60 ML/MIN/1.73M2
GLUCOSE SERPL-MCNC: 209 MG/DL (ref 70–99)
HCT VFR BLD AUTO: 39 % (ref 40.7–50.3)
HGB BLD-MCNC: 12.7 G/DL (ref 13–17)
IMM GRANULOCYTES # BLD AUTO: 0.03 K/UL (ref 0–0.3)
IMM GRANULOCYTES NFR BLD: 0 %
LACTATE BLDV-SCNC: 2.1 MMOL/L (ref 0.5–2.2)
LIPASE SERPL-CCNC: 23 U/L (ref 13–60)
LYMPHOCYTES NFR BLD: 1.08 K/UL (ref 1.1–3.7)
LYMPHOCYTES RELATIVE PERCENT: 12 % (ref 24–43)
MAGNESIUM SERPL-MCNC: 1.6 MG/DL (ref 1.6–2.6)
MCH RBC QN AUTO: 31.1 PG (ref 25.2–33.5)
MCHC RBC AUTO-ENTMCNC: 32.6 G/DL (ref 28.4–34.8)
MCV RBC AUTO: 95.4 FL (ref 82.6–102.9)
MONOCYTES NFR BLD: 0.42 K/UL (ref 0.1–1.2)
MONOCYTES NFR BLD: 5 % (ref 3–12)
NEUTROPHILS NFR BLD: 81 % (ref 36–65)
NEUTS SEG NFR BLD: 7.47 K/UL (ref 1.5–8.1)
NRBC BLD-RTO: 0 PER 100 WBC
PLATELET # BLD AUTO: 176 K/UL (ref 138–453)
PMV BLD AUTO: 9.3 FL (ref 8.1–13.5)
POTASSIUM SERPL-SCNC: 4.3 MMOL/L (ref 3.7–5.3)
PROT SERPL-MCNC: 7.2 G/DL (ref 6.4–8.3)
RBC # BLD AUTO: 4.09 M/UL (ref 4.21–5.77)
SODIUM SERPL-SCNC: 140 MMOL/L (ref 135–144)
TROPONIN I SERPL HS-MCNC: 22 NG/L (ref 0–22)
TROPONIN I SERPL HS-MCNC: 26 NG/L (ref 0–22)
WBC OTHER # BLD: 9.2 K/UL (ref 3.5–11.3)

## 2024-01-01 PROCEDURE — 93005 ELECTROCARDIOGRAM TRACING: CPT | Performed by: EMERGENCY MEDICINE

## 2024-01-01 PROCEDURE — 80048 BASIC METABOLIC PNL TOTAL CA: CPT

## 2024-01-01 PROCEDURE — 83880 ASSAY OF NATRIURETIC PEPTIDE: CPT

## 2024-01-01 PROCEDURE — 6360000004 HC RX CONTRAST MEDICATION: Performed by: NURSE PRACTITIONER

## 2024-01-01 PROCEDURE — 80076 HEPATIC FUNCTION PANEL: CPT

## 2024-01-01 PROCEDURE — 2580000003 HC RX 258: Performed by: NURSE PRACTITIONER

## 2024-01-01 PROCEDURE — 82150 ASSAY OF AMYLASE: CPT

## 2024-01-01 PROCEDURE — 6360000002 HC RX W HCPCS: Performed by: NURSE PRACTITIONER

## 2024-01-01 PROCEDURE — C9113 INJ PANTOPRAZOLE SODIUM, VIA: HCPCS | Performed by: NURSE PRACTITIONER

## 2024-01-01 PROCEDURE — 84484 ASSAY OF TROPONIN QUANT: CPT

## 2024-01-01 PROCEDURE — A4216 STERILE WATER/SALINE, 10 ML: HCPCS | Performed by: NURSE PRACTITIONER

## 2024-01-01 PROCEDURE — 83690 ASSAY OF LIPASE: CPT

## 2024-01-01 PROCEDURE — 96375 TX/PRO/DX INJ NEW DRUG ADDON: CPT

## 2024-01-01 PROCEDURE — 71045 X-RAY EXAM CHEST 1 VIEW: CPT

## 2024-01-01 PROCEDURE — 83735 ASSAY OF MAGNESIUM: CPT

## 2024-01-01 PROCEDURE — 99285 EMERGENCY DEPT VISIT HI MDM: CPT

## 2024-01-01 PROCEDURE — 85025 COMPLETE CBC W/AUTO DIFF WBC: CPT

## 2024-01-01 PROCEDURE — 74177 CT ABD & PELVIS W/CONTRAST: CPT

## 2024-01-01 PROCEDURE — 83605 ASSAY OF LACTIC ACID: CPT

## 2024-01-01 PROCEDURE — 96374 THER/PROPH/DIAG INJ IV PUSH: CPT

## 2024-01-01 RX ORDER — SODIUM CHLORIDE 0.9 % (FLUSH) 0.9 %
10 SYRINGE (ML) INJECTION PRN
Status: DISCONTINUED | OUTPATIENT
Start: 2024-01-01 | End: 2024-01-01 | Stop reason: HOSPADM

## 2024-01-01 RX ORDER — 0.9 % SODIUM CHLORIDE 0.9 %
80 INTRAVENOUS SOLUTION INTRAVENOUS ONCE
Status: COMPLETED | OUTPATIENT
Start: 2024-01-01 | End: 2024-01-01

## 2024-01-01 RX ORDER — PROMETHAZINE HYDROCHLORIDE 25 MG/1
25 TABLET ORAL ONCE
Status: CANCELLED | OUTPATIENT
Start: 2024-01-01 | End: 2024-01-01

## 2024-01-01 RX ORDER — ONDANSETRON 2 MG/ML
4 INJECTION INTRAMUSCULAR; INTRAVENOUS ONCE
Status: COMPLETED | OUTPATIENT
Start: 2024-01-01 | End: 2024-01-01

## 2024-01-01 RX ORDER — 0.9 % SODIUM CHLORIDE 0.9 %
250 INTRAVENOUS SOLUTION INTRAVENOUS ONCE
Status: COMPLETED | OUTPATIENT
Start: 2024-01-01 | End: 2024-01-01

## 2024-01-01 RX ORDER — PROMETHAZINE HYDROCHLORIDE 25 MG/1
25 TABLET ORAL EVERY 6 HOURS PRN
Qty: 15 TABLET | Refills: 0 | Status: SHIPPED | OUTPATIENT
Start: 2024-01-01 | End: 2024-01-08

## 2024-01-01 RX ADMIN — SODIUM CHLORIDE, PRESERVATIVE FREE 10 ML: 5 INJECTION INTRAVENOUS at 19:25

## 2024-01-01 RX ADMIN — ONDANSETRON 4 MG: 2 INJECTION INTRAMUSCULAR; INTRAVENOUS at 18:33

## 2024-01-01 RX ADMIN — SODIUM CHLORIDE 250 ML: 0.9 INJECTION, SOLUTION INTRAVENOUS at 18:41

## 2024-01-01 RX ADMIN — SODIUM CHLORIDE 80 ML: 9 INJECTION, SOLUTION INTRAVENOUS at 19:25

## 2024-01-01 RX ADMIN — IOPAMIDOL 75 ML: 755 INJECTION, SOLUTION INTRAVENOUS at 19:25

## 2024-01-01 RX ADMIN — PANTOPRAZOLE SODIUM 40 MG: 40 INJECTION, POWDER, FOR SOLUTION INTRAVENOUS at 18:34

## 2024-01-01 ASSESSMENT — ENCOUNTER SYMPTOMS
BACK PAIN: 0
NAUSEA: 1
SHORTNESS OF BREATH: 0
VOMITING: 1
COUGH: 0
ABDOMINAL PAIN: 1
DIARRHEA: 1

## 2024-01-01 ASSESSMENT — PAIN SCALES - GENERAL: PAINLEVEL_OUTOF10: 7

## 2024-01-01 ASSESSMENT — PAIN - FUNCTIONAL ASSESSMENT: PAIN_FUNCTIONAL_ASSESSMENT: 0-10

## 2024-01-01 NOTE — ED PROVIDER NOTES
eMERGENCY dEPARTMENT eNCOUnter   Independent Attestation     Pt Name: Doe Kapadia  MRN: [id number]  Birthdate 1940  Date of evaluation: 1/1/24     Doe Kapadia is a 83 y.o. male with CC: Abdominal Pain (Pt states he has been having N/V and chest pain since 2pm. ), Nausea, Emesis, and Chest Pain        This visit was performed by both a physician and an APC. I performed all aspects of the MDM as documented.      Erica B Goldberger, MD  Attending Emergency Physician         Sinus rhythm occasional PVCs  Ventricular rate 66  Right bundle branch block  Left axis deviation  QTc 463     Goldberger, Erica B, MD  01/01/24 7476    
further questions prior to being discharged and was instructed to return to the ED for new or worsening symptoms.          CONSULTS:  None    PROCEDURES:  Procedures    FINAL IMPRESSION      1. Calculus of bile duct without cholecystitis and without obstruction            Problem List  Patient Active Problem List   Diagnosis Code    Diverticulosis of colon K57.30    Essential hypertension I10    Type 2 diabetes mellitus (HCC) E11.9    Hyperlipidemia E78.5    Mixed hyperlipidemia E78.2    Type 2 diabetes mellitus without complication, with long-term current use of insulin (HCC) E11.9, Z79.4    Coronary artery disease involving native coronary artery of native heart without angina pectoris I25.10    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    Overweight (BMI 25.0-29.9) E66.3    Status post cardiac catheterization Z98.890    Autoimmune thyroiditis E06.3    Disorder associated with type 2 diabetes mellitus (HCC) E11.8    Hesitancy of micturition R39.11    PVC (premature ventricular contraction) I49.3    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    SOB (shortness of breath) R06.02    Ventricular premature depolarization I49.3    Vitamin deficiency E56.9    Other forms of angina pectoris I20.89    Unstable angina ([facility]) I20.0    Chronic renal disease, stage III ([facility]) [079113] N18.30         DISPOSITION/PLAN   DISPOSITION Decision To Discharge 01/01/2024 09:23:09 PM      PATIENT REFERRED TO:   Branden Hernandez MD  [address] [facility] [address]  [facility] [id number] [phone]    Schedule an appointment as soon as possible for a visit       Zulma Hill MD  [address]  [address]  [facility] [address] [phone]    Schedule an appointment as soon as possible for a visit       [facility] ED  [address] [address]  [phone]    If symptoms worsen      DISCHARGE MEDICATIONS:     Discharge Medication List as of 1/1/2024  9:34 PM        START taking these medications

## 2024-01-02 LAB
EKG Q-T INTERVAL: 442 MS
EKG QRS DURATION: 122 MS
EKG QTC CALCULATION (BAZETT): 463 MS
EKG R AXIS: -43 DEGREES
EKG T AXIS: 47 DEGREES
EKG VENTRICULAR RATE: 66 BPM

## 2024-01-02 NOTE — DISCHARGE INSTRUCTIONS
Take medications as prescribed.  Follow-up with gastroenterologist provided as well as your primary care provider soon as possible.  Please take order provided to have an outpatient ultrasound of your gallbladder.  This can be done at [facility] or [facility]  Return to emergency department for worsening or new symptoms.

## 2024-01-06 ENCOUNTER — HOSPITAL ENCOUNTER (OUTPATIENT)
Dept: ULTRASOUND IMAGING | Age: 84
End: 2024-01-06
Payer: MEDICARE

## 2024-01-06 DIAGNOSIS — K80.50 CALCULUS OF BILE DUCT WITHOUT CHOLECYSTITIS AND WITHOUT OBSTRUCTION: ICD-10-CM

## 2024-01-06 PROCEDURE — 76705 ECHO EXAM OF ABDOMEN: CPT

## 2024-01-24 ENCOUNTER — OFFICE VISIT (OUTPATIENT)
Dept: GASTROENTEROLOGY | Age: 84
End: 2024-01-24
Payer: MEDICARE

## 2024-01-24 VITALS
OXYGEN SATURATION: 99 % | TEMPERATURE: 97.2 F | HEART RATE: 65 BPM | WEIGHT: 172.8 LBS | BODY MASS INDEX: 27.77 KG/M2 | HEIGHT: 66 IN | SYSTOLIC BLOOD PRESSURE: 78 MMHG | DIASTOLIC BLOOD PRESSURE: 59 MMHG

## 2024-01-24 DIAGNOSIS — R93.5 ABNORMAL CT OF THE ABDOMEN: ICD-10-CM

## 2024-01-24 DIAGNOSIS — R11.2 NAUSEA AND VOMITING, UNSPECIFIED VOMITING TYPE: Primary | ICD-10-CM

## 2024-01-24 PROCEDURE — 3078F DIAST BP <80 MM HG: CPT | Performed by: INTERNAL MEDICINE

## 2024-01-24 PROCEDURE — 3074F SYST BP LT 130 MM HG: CPT | Performed by: INTERNAL MEDICINE

## 2024-01-24 PROCEDURE — 99204 OFFICE O/P NEW MOD 45 MIN: CPT | Performed by: INTERNAL MEDICINE

## 2024-01-24 PROCEDURE — 1123F ACP DISCUSS/DSCN MKR DOCD: CPT | Performed by: INTERNAL MEDICINE

## 2024-01-24 ASSESSMENT — ENCOUNTER SYMPTOMS
ABDOMINAL DISTENTION: 0
NAUSEA: 0
COUGH: 1
BLOOD IN STOOL: 0
VOMITING: 0
TROUBLE SWALLOWING: 0
CONSTIPATION: 0
DIARRHEA: 0

## 2024-01-24 NOTE — PROGRESS NOTES
Reason for Referral:   No referring provider defined for this encounter.    Chief Complaint   Patient presents with    New Patient     Gallstone        1. Nausea and vomiting, unspecified vomiting type    2. Abnormal CT of the abdomen            HISTORY OF PRESENT ILLNESS:   Patient seen along with his wife.  All January 1, 2024 patient had nausea vomiting.  Emesis contained gastric juice.  No hematemesis or previously ingested food.  No fever or chills.  Denies abdominal pain, bloating.  He also had nonspecific chest discomfort.   as he has a history of coronary artery disease, presented to the emergency room and he had a workup done that was negative.  Had a CT scan of the abdomen done that revealed signs of stroke in the neck of the gallbladder/cystic duct.  At that time labs including liver battery, CBC, lipase within normal limits.  Symptoms of nausea vomiting resolved in 1 hour.  Patient was discharged from the emergency department and thinks that he does not have recurrent symptoms.  No prior history of nausea vomiting.  He denies symptoms suggestive of gallbladder colic.  He has good appetite, no dysphagia or dyspepsia.  Bowel movements satisfactory.  Denies abdominal distention bloating etc.  On January 6, 2024 he had an ultrasound of the gallbladder done reported to be normal.    This 84-year-old gentleman had a colonoscopy about 10 years ago.    He has coronary artery disease being followed by cardiology service.  Medications reviewed.  Last time patient was seen by me was in 2015.    Past Medical,Family, and Social History reviewed and does contribute to the patient presentingcondition.    Patient's PMH/PSH,SH,PSYCH Hx, MEDs, ALLERGIES, and ROS were all reviewed and updated in the appropriate sections.    PAST MEDICAL HISTORY:  Past Medical History:   Diagnosis Date    Arthritis     CAD (coronary artery disease)     CHF (congestive heart failure) (HCC)     Diabetes mellitus (HCC)     History of

## 2024-01-31 ENCOUNTER — CARE COORDINATION (OUTPATIENT)
Dept: CARE COORDINATION | Age: 84
End: 2024-01-31

## 2024-02-01 NOTE — CARE COORDINATION
Payer referral  Called and spoke with patients wife, Corine. Explained care coordination and discussed any needs.   Wife stated she did not think they need care coordination at this time but she did take my number for any future needs.   Corine reports that patient was just seen by endocrine and he was started on a different insulin, his A1C was 8.  She stated that his medications are not too expensive.  She drives him to all appointments.  She plans to get ACP paperwork to [facility].  He has been followed by GI since last admission.  Wife was encouraged to reach out with any needs in the future.   Joy Santiago, BSN, RN ambulatory care manager [phone].

## 2024-03-14 ENCOUNTER — HOSPITAL ENCOUNTER (INPATIENT)
Age: 84
LOS: 6 days | Discharge: NURSING, REHAB OR LONG-TERM CARE | DRG: 690 | End: 2024-03-21
Attending: EMERGENCY MEDICINE | Admitting: STUDENT IN AN ORGANIZED HEALTH CARE EDUCATION/TRAINING PROGRAM
Payer: MEDICARE

## 2024-03-14 ENCOUNTER — APPOINTMENT (OUTPATIENT)
Dept: CT IMAGING | Age: 84
DRG: 690 | End: 2024-03-14
Payer: MEDICARE

## 2024-03-14 ENCOUNTER — APPOINTMENT (OUTPATIENT)
Dept: GENERAL RADIOLOGY | Age: 84
DRG: 690 | End: 2024-03-14
Payer: MEDICARE

## 2024-03-14 DIAGNOSIS — N30.00 ACUTE CYSTITIS WITHOUT HEMATURIA: ICD-10-CM

## 2024-03-14 DIAGNOSIS — R53.83 OTHER FATIGUE: Primary | ICD-10-CM

## 2024-03-14 LAB
AMORPH SED URNS QL MICRO: ABNORMAL
ANION GAP SERPL CALCULATED.3IONS-SCNC: 12 MMOL/L (ref 9–17)
BACTERIA URNS QL MICRO: ABNORMAL
BASOPHILS # BLD: 0 K/UL (ref 0–0.2)
BASOPHILS NFR BLD: 0 % (ref 0–2)
BILIRUB UR QL STRIP: NEGATIVE
BUN SERPL-MCNC: 37 MG/DL (ref 8–23)
CALCIUM SERPL-MCNC: 8.3 MG/DL (ref 8.6–10.4)
CASTS #/AREA URNS LPF: ABNORMAL /LPF
CHLORIDE SERPL-SCNC: 101 MMOL/L (ref 98–107)
CLARITY UR: ABNORMAL
CO2 SERPL-SCNC: 20 MMOL/L (ref 20–31)
COLOR UR: ABNORMAL
CREAT SERPL-MCNC: 1.6 MG/DL (ref 0.7–1.2)
EOSINOPHIL # BLD: 0 K/UL (ref 0–0.4)
EOSINOPHILS RELATIVE PERCENT: 0 % (ref 0–4)
EPI CELLS #/AREA URNS HPF: ABNORMAL /HPF
ERYTHROCYTE [DISTWIDTH] IN BLOOD BY AUTOMATED COUNT: 15.5 % (ref 11.5–14.9)
FLUAV RNA RESP QL NAA+PROBE: NOT DETECTED
FLUBV RNA RESP QL NAA+PROBE: NOT DETECTED
GFR SERPL CREATININE-BSD FRML MDRD: 43 ML/MIN/1.73M2
GLUCOSE SERPL-MCNC: 368 MG/DL (ref 70–99)
GLUCOSE UR STRIP-MCNC: ABNORMAL MG/DL
HCT VFR BLD AUTO: 32.2 % (ref 41–53)
HGB BLD-MCNC: 10.4 G/DL (ref 13.5–17.5)
HGB UR QL STRIP.AUTO: NEGATIVE
INR PPP: 1.3
KETONES UR STRIP-MCNC: ABNORMAL MG/DL
LEUKOCYTE ESTERASE UR QL STRIP: ABNORMAL
LYMPHOCYTES NFR BLD: 1.33 K/UL (ref 1–4.8)
LYMPHOCYTES RELATIVE PERCENT: 10 % (ref 24–44)
MAGNESIUM SERPL-MCNC: 2 MG/DL (ref 1.6–2.6)
MCH RBC QN AUTO: 30.1 PG (ref 26–34)
MCHC RBC AUTO-ENTMCNC: 32.3 G/DL (ref 31–37)
MCV RBC AUTO: 93 FL (ref 80–100)
MONOCYTES NFR BLD: 0.13 K/UL (ref 0.1–1.3)
MONOCYTES NFR BLD: 1 % (ref 1–7)
MORPHOLOGY: ABNORMAL
MUCOUS THREADS URNS QL MICRO: ABNORMAL
NEUTROPHILS NFR BLD: 89 % (ref 36–66)
NEUTS SEG NFR BLD: 11.84 K/UL (ref 1.3–9.1)
NITRITE UR QL STRIP: NEGATIVE
PARTIAL THROMBOPLASTIN TIME: 22.2 SEC (ref 24–36)
PH UR STRIP: 5.5 [PH] (ref 5–8)
PHOSPHATE SERPL-MCNC: 2.9 MG/DL (ref 2.5–4.5)
PLATELET # BLD AUTO: 327 K/UL (ref 150–450)
PMV BLD AUTO: 7.2 FL (ref 6–12)
POTASSIUM SERPL-SCNC: 5.1 MMOL/L (ref 3.7–5.3)
PROT UR STRIP-MCNC: ABNORMAL MG/DL
PROTHROMBIN TIME: 16.9 SEC (ref 11.8–14.6)
RBC # BLD AUTO: 3.47 M/UL (ref 4.5–5.9)
RBC #/AREA URNS HPF: ABNORMAL /HPF
SARS-COV-2 RNA RESP QL NAA+PROBE: NOT DETECTED
SODIUM SERPL-SCNC: 133 MMOL/L (ref 135–144)
SOURCE: NORMAL
SP GR UR STRIP: 1.02 (ref 1–1.03)
SPECIMEN DESCRIPTION: NORMAL
TROPONIN I SERPL HS-MCNC: 37 NG/L (ref 0–22)
TROPONIN I SERPL HS-MCNC: 42 NG/L (ref 0–22)
UROBILINOGEN UR STRIP-ACNC: NORMAL EU/DL (ref 0–1)
WBC #/AREA URNS HPF: ABNORMAL /HPF
WBC OTHER # BLD: 13.3 K/UL (ref 3.5–11)

## 2024-03-14 PROCEDURE — 85610 PROTHROMBIN TIME: CPT

## 2024-03-14 PROCEDURE — 93005 ELECTROCARDIOGRAM TRACING: CPT | Performed by: EMERGENCY MEDICINE

## 2024-03-14 PROCEDURE — 81001 URINALYSIS AUTO W/SCOPE: CPT

## 2024-03-14 PROCEDURE — 80048 BASIC METABOLIC PNL TOTAL CA: CPT

## 2024-03-14 PROCEDURE — 84484 ASSAY OF TROPONIN QUANT: CPT

## 2024-03-14 PROCEDURE — 84100 ASSAY OF PHOSPHORUS: CPT

## 2024-03-14 PROCEDURE — 70450 CT HEAD/BRAIN W/O DYE: CPT

## 2024-03-14 PROCEDURE — 85025 COMPLETE CBC W/AUTO DIFF WBC: CPT

## 2024-03-14 PROCEDURE — 99285 EMERGENCY DEPT VISIT HI MDM: CPT

## 2024-03-14 PROCEDURE — 71045 X-RAY EXAM CHEST 1 VIEW: CPT

## 2024-03-14 PROCEDURE — 2580000003 HC RX 258: Performed by: EMERGENCY MEDICINE

## 2024-03-14 PROCEDURE — 36415 COLL VENOUS BLD VENIPUNCTURE: CPT

## 2024-03-14 PROCEDURE — 87636 SARSCOV2 & INF A&B AMP PRB: CPT

## 2024-03-14 PROCEDURE — 83735 ASSAY OF MAGNESIUM: CPT

## 2024-03-14 PROCEDURE — 85730 THROMBOPLASTIN TIME PARTIAL: CPT

## 2024-03-14 RX ORDER — 0.9 % SODIUM CHLORIDE 0.9 %
1000 INTRAVENOUS SOLUTION INTRAVENOUS ONCE
Status: COMPLETED | OUTPATIENT
Start: 2024-03-14 | End: 2024-03-14

## 2024-03-14 RX ADMIN — SODIUM CHLORIDE 1000 ML: 9 INJECTION, SOLUTION INTRAVENOUS at 21:48

## 2024-03-14 ASSESSMENT — LIFESTYLE VARIABLES
HOW MANY STANDARD DRINKS CONTAINING ALCOHOL DO YOU HAVE ON A TYPICAL DAY: PATIENT DOES NOT DRINK
HOW OFTEN DO YOU HAVE A DRINK CONTAINING ALCOHOL: NEVER

## 2024-03-14 ASSESSMENT — PAIN - FUNCTIONAL ASSESSMENT: PAIN_FUNCTIONAL_ASSESSMENT: NONE - DENIES PAIN

## 2024-03-15 PROBLEM — N17.9 AKI (ACUTE KIDNEY INJURY) (HCC): Status: ACTIVE | Noted: 2024-03-15

## 2024-03-15 PROBLEM — R53.1 WEAKNESS GENERALIZED: Status: ACTIVE | Noted: 2024-03-15

## 2024-03-15 PROBLEM — N39.0 UTI (URINARY TRACT INFECTION): Status: ACTIVE | Noted: 2024-03-15

## 2024-03-15 LAB
ANION GAP SERPL CALCULATED.3IONS-SCNC: 10 MMOL/L (ref 9–17)
BUN SERPL-MCNC: 34 MG/DL (ref 8–23)
CALCIUM SERPL-MCNC: 8 MG/DL (ref 8.6–10.4)
CHLORIDE SERPL-SCNC: 101 MMOL/L (ref 98–107)
CO2 SERPL-SCNC: 20 MMOL/L (ref 20–31)
CREAT SERPL-MCNC: 1.4 MG/DL (ref 0.7–1.2)
GFR SERPL CREATININE-BSD FRML MDRD: 50 ML/MIN/1.73M2
GLUCOSE BLD-MCNC: 152 MG/DL (ref 75–110)
GLUCOSE BLD-MCNC: 182 MG/DL (ref 75–110)
GLUCOSE BLD-MCNC: 197 MG/DL (ref 75–110)
GLUCOSE SERPL-MCNC: 390 MG/DL (ref 70–99)
LACTATE BLDV-SCNC: 1.2 MMOL/L (ref 0.5–2.2)
POTASSIUM SERPL-SCNC: 5.1 MMOL/L (ref 3.7–5.3)
PROCALCITONIN SERPL-MCNC: 0.64 NG/ML
SODIUM SERPL-SCNC: 131 MMOL/L (ref 135–144)

## 2024-03-15 PROCEDURE — 6360000002 HC RX W HCPCS: Performed by: STUDENT IN AN ORGANIZED HEALTH CARE EDUCATION/TRAINING PROGRAM

## 2024-03-15 PROCEDURE — 6370000000 HC RX 637 (ALT 250 FOR IP): Performed by: STUDENT IN AN ORGANIZED HEALTH CARE EDUCATION/TRAINING PROGRAM

## 2024-03-15 PROCEDURE — 6360000002 HC RX W HCPCS: Performed by: EMERGENCY MEDICINE

## 2024-03-15 PROCEDURE — 36415 COLL VENOUS BLD VENIPUNCTURE: CPT

## 2024-03-15 PROCEDURE — 2580000003 HC RX 258: Performed by: EMERGENCY MEDICINE

## 2024-03-15 PROCEDURE — 84145 PROCALCITONIN (PCT): CPT

## 2024-03-15 PROCEDURE — 99223 1ST HOSP IP/OBS HIGH 75: CPT | Performed by: STUDENT IN AN ORGANIZED HEALTH CARE EDUCATION/TRAINING PROGRAM

## 2024-03-15 PROCEDURE — 82947 ASSAY GLUCOSE BLOOD QUANT: CPT

## 2024-03-15 PROCEDURE — 6370000000 HC RX 637 (ALT 250 FOR IP): Performed by: EMERGENCY MEDICINE

## 2024-03-15 PROCEDURE — 51798 US URINE CAPACITY MEASURE: CPT

## 2024-03-15 PROCEDURE — 1200000000 HC SEMI PRIVATE

## 2024-03-15 PROCEDURE — 2580000003 HC RX 258: Performed by: STUDENT IN AN ORGANIZED HEALTH CARE EDUCATION/TRAINING PROGRAM

## 2024-03-15 PROCEDURE — 83605 ASSAY OF LACTIC ACID: CPT

## 2024-03-15 PROCEDURE — 80048 BASIC METABOLIC PNL TOTAL CA: CPT

## 2024-03-15 RX ORDER — POTASSIUM CHLORIDE 7.45 MG/ML
10 INJECTION INTRAVENOUS PRN
Status: DISCONTINUED | OUTPATIENT
Start: 2024-03-15 | End: 2024-03-21 | Stop reason: HOSPADM

## 2024-03-15 RX ORDER — PROMETHAZINE HYDROCHLORIDE 6.25 MG/5ML
6.25 SYRUP ORAL EVERY 6 HOURS PRN
Status: DISCONTINUED | OUTPATIENT
Start: 2024-03-15 | End: 2024-03-21 | Stop reason: HOSPADM

## 2024-03-15 RX ORDER — SODIUM CHLORIDE 0.9 % (FLUSH) 0.9 %
5-40 SYRINGE (ML) INJECTION EVERY 12 HOURS SCHEDULED
Status: DISCONTINUED | OUTPATIENT
Start: 2024-03-15 | End: 2024-03-21 | Stop reason: HOSPADM

## 2024-03-15 RX ORDER — ISOSORBIDE MONONITRATE 30 MG/1
30 TABLET, EXTENDED RELEASE ORAL DAILY
Status: DISCONTINUED | OUTPATIENT
Start: 2024-03-15 | End: 2024-03-21 | Stop reason: HOSPADM

## 2024-03-15 RX ORDER — BENZONATATE 100 MG/1
100 CAPSULE ORAL ONCE
Status: COMPLETED | OUTPATIENT
Start: 2024-03-15 | End: 2024-03-15

## 2024-03-15 RX ORDER — MAGNESIUM SULFATE HEPTAHYDRATE 40 MG/ML
2000 INJECTION, SOLUTION INTRAVENOUS PRN
Status: DISCONTINUED | OUTPATIENT
Start: 2024-03-15 | End: 2024-03-21 | Stop reason: HOSPADM

## 2024-03-15 RX ORDER — METOPROLOL SUCCINATE 25 MG/1
25 TABLET, EXTENDED RELEASE ORAL DAILY
Status: DISCONTINUED | OUTPATIENT
Start: 2024-03-15 | End: 2024-03-21 | Stop reason: HOSPADM

## 2024-03-15 RX ORDER — DEXTROSE MONOHYDRATE 100 MG/ML
INJECTION, SOLUTION INTRAVENOUS CONTINUOUS PRN
Status: DISCONTINUED | OUTPATIENT
Start: 2024-03-15 | End: 2024-03-21 | Stop reason: HOSPADM

## 2024-03-15 RX ORDER — 0.9 % SODIUM CHLORIDE 0.9 %
500 INTRAVENOUS SOLUTION INTRAVENOUS ONCE
Status: COMPLETED | OUTPATIENT
Start: 2024-03-15 | End: 2024-03-15

## 2024-03-15 RX ORDER — ONDANSETRON 2 MG/ML
4 INJECTION INTRAMUSCULAR; INTRAVENOUS EVERY 6 HOURS PRN
Status: DISCONTINUED | OUTPATIENT
Start: 2024-03-15 | End: 2024-03-15

## 2024-03-15 RX ORDER — BENZONATATE 200 MG/1
200 CAPSULE ORAL 3 TIMES DAILY PRN
Status: DISCONTINUED | OUTPATIENT
Start: 2024-03-15 | End: 2024-03-21 | Stop reason: HOSPADM

## 2024-03-15 RX ORDER — INSULIN GLARGINE 100 [IU]/ML
20 INJECTION, SOLUTION SUBCUTANEOUS NIGHTLY
Status: DISCONTINUED | OUTPATIENT
Start: 2024-03-15 | End: 2024-03-15

## 2024-03-15 RX ORDER — SODIUM CHLORIDE 9 MG/ML
INJECTION, SOLUTION INTRAVENOUS PRN
Status: DISCONTINUED | OUTPATIENT
Start: 2024-03-15 | End: 2024-03-21 | Stop reason: HOSPADM

## 2024-03-15 RX ORDER — SODIUM CHLORIDE 0.9 % (FLUSH) 0.9 %
5-40 SYRINGE (ML) INJECTION PRN
Status: DISCONTINUED | OUTPATIENT
Start: 2024-03-15 | End: 2024-03-21 | Stop reason: HOSPADM

## 2024-03-15 RX ORDER — ACETAMINOPHEN 325 MG/1
650 TABLET ORAL EVERY 6 HOURS PRN
Status: DISCONTINUED | OUTPATIENT
Start: 2024-03-15 | End: 2024-03-21 | Stop reason: HOSPADM

## 2024-03-15 RX ORDER — ENOXAPARIN SODIUM 100 MG/ML
40 INJECTION SUBCUTANEOUS DAILY
Status: DISCONTINUED | OUTPATIENT
Start: 2024-03-15 | End: 2024-03-21 | Stop reason: HOSPADM

## 2024-03-15 RX ORDER — POLYETHYLENE GLYCOL 3350 17 G/17G
17 POWDER, FOR SOLUTION ORAL DAILY PRN
Status: DISCONTINUED | OUTPATIENT
Start: 2024-03-15 | End: 2024-03-21 | Stop reason: HOSPADM

## 2024-03-15 RX ORDER — POTASSIUM CHLORIDE 20 MEQ/1
40 TABLET, EXTENDED RELEASE ORAL PRN
Status: DISCONTINUED | OUTPATIENT
Start: 2024-03-15 | End: 2024-03-21 | Stop reason: HOSPADM

## 2024-03-15 RX ORDER — LEVOTHYROXINE SODIUM 88 UG/1
88 TABLET ORAL DAILY
Status: DISCONTINUED | OUTPATIENT
Start: 2024-03-15 | End: 2024-03-21 | Stop reason: HOSPADM

## 2024-03-15 RX ORDER — ASPIRIN 81 MG/1
81 TABLET, CHEWABLE ORAL DAILY
Status: DISCONTINUED | OUTPATIENT
Start: 2024-03-15 | End: 2024-03-21 | Stop reason: HOSPADM

## 2024-03-15 RX ORDER — GUAIFENESIN 600 MG/1
600 TABLET, EXTENDED RELEASE ORAL ONCE
Status: DISCONTINUED | OUTPATIENT
Start: 2024-03-15 | End: 2024-03-21 | Stop reason: HOSPADM

## 2024-03-15 RX ORDER — ROSUVASTATIN CALCIUM 40 MG/1
40 TABLET, COATED ORAL DAILY
Status: DISCONTINUED | OUTPATIENT
Start: 2024-03-15 | End: 2024-03-21 | Stop reason: HOSPADM

## 2024-03-15 RX ORDER — INSULIN LISPRO 100 [IU]/ML
0-16 INJECTION, SOLUTION INTRAVENOUS; SUBCUTANEOUS
Status: DISCONTINUED | OUTPATIENT
Start: 2024-03-15 | End: 2024-03-21 | Stop reason: HOSPADM

## 2024-03-15 RX ORDER — SODIUM CHLORIDE 9 MG/ML
INJECTION, SOLUTION INTRAVENOUS CONTINUOUS
Status: ACTIVE | OUTPATIENT
Start: 2024-03-15 | End: 2024-03-16

## 2024-03-15 RX ORDER — ONDANSETRON 4 MG/1
4 TABLET, ORALLY DISINTEGRATING ORAL EVERY 8 HOURS PRN
Status: DISCONTINUED | OUTPATIENT
Start: 2024-03-15 | End: 2024-03-15

## 2024-03-15 RX ORDER — LOSARTAN POTASSIUM 50 MG/1
50 TABLET ORAL DAILY
Status: DISCONTINUED | OUTPATIENT
Start: 2024-03-15 | End: 2024-03-21 | Stop reason: HOSPADM

## 2024-03-15 RX ORDER — INSULIN GLARGINE 100 [IU]/ML
20 INJECTION, SOLUTION SUBCUTANEOUS DAILY
Status: DISCONTINUED | OUTPATIENT
Start: 2024-03-15 | End: 2024-03-21 | Stop reason: HOSPADM

## 2024-03-15 RX ORDER — ACETAMINOPHEN 650 MG/1
650 SUPPOSITORY RECTAL EVERY 6 HOURS PRN
Status: DISCONTINUED | OUTPATIENT
Start: 2024-03-15 | End: 2024-03-21 | Stop reason: HOSPADM

## 2024-03-15 RX ORDER — INSULIN LISPRO 100 [IU]/ML
0-4 INJECTION, SOLUTION INTRAVENOUS; SUBCUTANEOUS NIGHTLY
Status: DISCONTINUED | OUTPATIENT
Start: 2024-03-15 | End: 2024-03-21 | Stop reason: HOSPADM

## 2024-03-15 RX ADMIN — INSULIN GLARGINE 20 UNITS: 100 INJECTION, SOLUTION SUBCUTANEOUS at 12:36

## 2024-03-15 RX ADMIN — LOSARTAN POTASSIUM 50 MG: 50 TABLET, FILM COATED ORAL at 08:43

## 2024-03-15 RX ADMIN — ROSUVASTATIN 40 MG: 40 TABLET, FILM COATED ORAL at 08:43

## 2024-03-15 RX ADMIN — ISOSORBIDE MONONITRATE 30 MG: 30 TABLET, EXTENDED RELEASE ORAL at 08:43

## 2024-03-15 RX ADMIN — BENZONATATE 200 MG: 200 CAPSULE ORAL at 13:11

## 2024-03-15 RX ADMIN — INSULIN LISPRO 16 UNITS: 100 INJECTION, SOLUTION INTRAVENOUS; SUBCUTANEOUS at 08:43

## 2024-03-15 RX ADMIN — SODIUM CHLORIDE: 9 INJECTION, SOLUTION INTRAVENOUS at 19:22

## 2024-03-15 RX ADMIN — SODIUM CHLORIDE: 9 INJECTION, SOLUTION INTRAVENOUS at 13:10

## 2024-03-15 RX ADMIN — ENOXAPARIN SODIUM 40 MG: 100 INJECTION SUBCUTANEOUS at 08:43

## 2024-03-15 RX ADMIN — TICAGRELOR 90 MG: 90 TABLET ORAL at 20:47

## 2024-03-15 RX ADMIN — SODIUM CHLORIDE 500 ML: 9 INJECTION, SOLUTION INTRAVENOUS at 16:43

## 2024-03-15 RX ADMIN — TICAGRELOR 90 MG: 90 TABLET ORAL at 08:43

## 2024-03-15 RX ADMIN — SODIUM CHLORIDE, PRESERVATIVE FREE 10 ML: 5 INJECTION INTRAVENOUS at 08:47

## 2024-03-15 RX ADMIN — CEFTRIAXONE SODIUM 1000 MG: 1 INJECTION, POWDER, FOR SOLUTION INTRAMUSCULAR; INTRAVENOUS at 21:42

## 2024-03-15 RX ADMIN — METOPROLOL SUCCINATE 25 MG: 25 TABLET, EXTENDED RELEASE ORAL at 08:43

## 2024-03-15 RX ADMIN — LEVOTHYROXINE SODIUM 88 MCG: 0.09 TABLET ORAL at 08:43

## 2024-03-15 RX ADMIN — ASPIRIN 81 MG: 81 TABLET, CHEWABLE ORAL at 08:43

## 2024-03-15 RX ADMIN — CEFTRIAXONE SODIUM 1000 MG: 1 INJECTION, POWDER, FOR SOLUTION INTRAMUSCULAR; INTRAVENOUS at 00:48

## 2024-03-15 RX ADMIN — BENZONATATE 100 MG: 100 CAPSULE ORAL at 01:40

## 2024-03-15 ASSESSMENT — HEART SCORE: ECG: NORMAL

## 2024-03-15 NOTE — ED NOTES
Report given to SAMM samuels from [facility].   Report method in person   The following was reviewed with receiving RN:   Current vital signs:  BP (!) 145/70   Pulse 86   Temp 97.8 °F (36.6 °C) (Oral)   Resp 18   Wt 81.2 kg (179 lb)   SpO2 97%   BMI 28.89 kg/m²                MEWS Score: 1     Any medication or safety alerts were reviewed. Any pending diagnostics and notifications were also reviewed, as well as any safety concerns or issues, abnormal labs, abnormal imaging, and abnormal assessment findings. Questions were answered.

## 2024-03-15 NOTE — ACP (ADVANCE CARE PLANNING)
Advance Care Planning     Advance Care Planning Activator (Inpatient)  Conversation Note      Date of ACP Conversation: 3/15/2024     Conversation Conducted with: Patient with Decision Making Capacity    ACP Activator: Sia Bruno RN    Health Care Decision Maker:     Current Designated Health Care Decision Maker:     Click here to complete Healthcare Decision Makers including section of the Healthcare Decision Maker Relationship (ie \"Primary\")  Today we documented Decision Maker(s) consistent with Legal Next of Kin hierarchy.    Care Preferences    Ventilation:  \"If you were in your present state of health and suddenly became very ill and were unable to breathe on your own, what would your preference be about the use of a ventilator (breathing machine) if it were available to you?\"      Would the patient desire the use of ventilator (breathing machine)?: yes    \"If your health worsens and it becomes clear that your chance of recovery is unlikely, what would your preference be about the use of a ventilator (breathing machine) if it were available to you?\"     Would the patient desire the use of ventilator (breathing machine)?: No      Resuscitation  \"CPR works best to restart the heart when there is a sudden event, like a heart attack, in someone who is otherwise healthy. Unfortunately, CPR does not typically restart the heart for people who have serious health conditions or who are very sick.\"    \"In the event your heart stopped as a result of an underlying serious health condition, would you want attempts to be made to restart your heart (answer \"yes\" for attempt to resuscitate) or would you prefer a natural death (answer \"no\" for do not attempt to resuscitate)?\" yes       [] Yes   [x] No   Educated Patient / Decision Maker regarding differences between Advance Directives and portable DNR orders.    Length of ACP Conversation in minutes:      Conversation Outcomes:  ACP discussion completed    Follow-up plan:

## 2024-03-16 ENCOUNTER — APPOINTMENT (OUTPATIENT)
Dept: CT IMAGING | Age: 84
DRG: 690 | End: 2024-03-16
Payer: MEDICARE

## 2024-03-16 LAB
ANION GAP SERPL CALCULATED.3IONS-SCNC: 14 MMOL/L (ref 9–17)
BASOPHILS # BLD: 0 K/UL (ref 0–0.2)
BASOPHILS NFR BLD: 0 % (ref 0–2)
BUN SERPL-MCNC: 19 MG/DL (ref 8–23)
CALCIUM SERPL-MCNC: 7.8 MG/DL (ref 8.6–10.4)
CHLORIDE SERPL-SCNC: 101 MMOL/L (ref 98–107)
CO2 SERPL-SCNC: 18 MMOL/L (ref 20–31)
CREAT SERPL-MCNC: 1.2 MG/DL (ref 0.7–1.2)
EKG ATRIAL RATE: 87 BPM
EKG P AXIS: 43 DEGREES
EKG P-R INTERVAL: 174 MS
EKG Q-T INTERVAL: 422 MS
EKG QRS DURATION: 122 MS
EKG QTC CALCULATION (BAZETT): 507 MS
EKG R AXIS: -36 DEGREES
EKG T AXIS: 137 DEGREES
EKG VENTRICULAR RATE: 87 BPM
EOSINOPHIL # BLD: 0.1 K/UL (ref 0–0.4)
EOSINOPHILS RELATIVE PERCENT: 1 % (ref 0–4)
ERYTHROCYTE [DISTWIDTH] IN BLOOD BY AUTOMATED COUNT: 15.7 % (ref 11.5–14.9)
GFR SERPL CREATININE-BSD FRML MDRD: >60 ML/MIN/1.73M2
GLUCOSE BLD-MCNC: 156 MG/DL (ref 75–110)
GLUCOSE BLD-MCNC: 191 MG/DL (ref 75–110)
GLUCOSE BLD-MCNC: 239 MG/DL (ref 75–110)
GLUCOSE BLD-MCNC: 247 MG/DL (ref 75–110)
GLUCOSE SERPL-MCNC: 172 MG/DL (ref 70–99)
HCT VFR BLD AUTO: 32.8 % (ref 41–53)
HGB BLD-MCNC: 10.7 G/DL (ref 13.5–17.5)
LYMPHOCYTES NFR BLD: 0.5 K/UL (ref 1–4.8)
LYMPHOCYTES RELATIVE PERCENT: 5 % (ref 24–44)
MCH RBC QN AUTO: 30.8 PG (ref 26–34)
MCHC RBC AUTO-ENTMCNC: 32.5 G/DL (ref 31–37)
MCV RBC AUTO: 94.8 FL (ref 80–100)
MONOCYTES NFR BLD: 0.6 K/UL (ref 0.1–1.3)
MONOCYTES NFR BLD: 6 % (ref 1–7)
NEUTROPHILS NFR BLD: 88 % (ref 36–66)
NEUTS SEG NFR BLD: 8.1 K/UL (ref 1.3–9.1)
PLATELET # BLD AUTO: 283 K/UL (ref 150–450)
PMV BLD AUTO: 7.1 FL (ref 6–12)
POTASSIUM SERPL-SCNC: 4.1 MMOL/L (ref 3.7–5.3)
RBC # BLD AUTO: 3.46 M/UL (ref 4.5–5.9)
SODIUM SERPL-SCNC: 133 MMOL/L (ref 135–144)
WBC OTHER # BLD: 9.3 K/UL (ref 3.5–11)

## 2024-03-16 PROCEDURE — 97162 PT EVAL MOD COMPLEX 30 MIN: CPT

## 2024-03-16 PROCEDURE — 71250 CT THORAX DX C-: CPT

## 2024-03-16 PROCEDURE — 2580000003 HC RX 258: Performed by: STUDENT IN AN ORGANIZED HEALTH CARE EDUCATION/TRAINING PROGRAM

## 2024-03-16 PROCEDURE — 97530 THERAPEUTIC ACTIVITIES: CPT

## 2024-03-16 PROCEDURE — 36415 COLL VENOUS BLD VENIPUNCTURE: CPT

## 2024-03-16 PROCEDURE — 80048 BASIC METABOLIC PNL TOTAL CA: CPT

## 2024-03-16 PROCEDURE — 85025 COMPLETE CBC W/AUTO DIFF WBC: CPT

## 2024-03-16 PROCEDURE — 97166 OT EVAL MOD COMPLEX 45 MIN: CPT

## 2024-03-16 PROCEDURE — 6370000000 HC RX 637 (ALT 250 FOR IP): Performed by: STUDENT IN AN ORGANIZED HEALTH CARE EDUCATION/TRAINING PROGRAM

## 2024-03-16 PROCEDURE — 1200000000 HC SEMI PRIVATE

## 2024-03-16 PROCEDURE — 93010 ELECTROCARDIOGRAM REPORT: CPT | Performed by: INTERNAL MEDICINE

## 2024-03-16 PROCEDURE — 97535 SELF CARE MNGMENT TRAINING: CPT

## 2024-03-16 PROCEDURE — 82947 ASSAY GLUCOSE BLOOD QUANT: CPT

## 2024-03-16 PROCEDURE — 99232 SBSQ HOSP IP/OBS MODERATE 35: CPT | Performed by: FAMILY MEDICINE

## 2024-03-16 PROCEDURE — 92610 EVALUATE SWALLOWING FUNCTION: CPT

## 2024-03-16 PROCEDURE — 87086 URINE CULTURE/COLONY COUNT: CPT

## 2024-03-16 PROCEDURE — 6360000002 HC RX W HCPCS: Performed by: STUDENT IN AN ORGANIZED HEALTH CARE EDUCATION/TRAINING PROGRAM

## 2024-03-16 RX ADMIN — INSULIN LISPRO 4 UNITS: 100 INJECTION, SOLUTION INTRAVENOUS; SUBCUTANEOUS at 18:13

## 2024-03-16 RX ADMIN — INSULIN GLARGINE 20 UNITS: 100 INJECTION, SOLUTION SUBCUTANEOUS at 11:28

## 2024-03-16 RX ADMIN — TICAGRELOR 90 MG: 90 TABLET ORAL at 09:42

## 2024-03-16 RX ADMIN — METOPROLOL SUCCINATE 25 MG: 25 TABLET, EXTENDED RELEASE ORAL at 09:35

## 2024-03-16 RX ADMIN — ISOSORBIDE MONONITRATE 30 MG: 30 TABLET, EXTENDED RELEASE ORAL at 09:35

## 2024-03-16 RX ADMIN — LEVOTHYROXINE SODIUM 88 MCG: 0.09 TABLET ORAL at 09:35

## 2024-03-16 RX ADMIN — ENOXAPARIN SODIUM 40 MG: 100 INJECTION SUBCUTANEOUS at 09:35

## 2024-03-16 RX ADMIN — LOSARTAN POTASSIUM 50 MG: 50 TABLET, FILM COATED ORAL at 09:35

## 2024-03-16 RX ADMIN — ASPIRIN 81 MG: 81 TABLET, CHEWABLE ORAL at 09:35

## 2024-03-16 RX ADMIN — BENZONATATE 200 MG: 200 CAPSULE ORAL at 21:26

## 2024-03-16 RX ADMIN — TICAGRELOR 90 MG: 90 TABLET ORAL at 21:26

## 2024-03-16 RX ADMIN — CEFTRIAXONE SODIUM 1000 MG: 1 INJECTION, POWDER, FOR SOLUTION INTRAMUSCULAR; INTRAVENOUS at 21:31

## 2024-03-16 RX ADMIN — ROSUVASTATIN 40 MG: 40 TABLET, FILM COATED ORAL at 09:35

## 2024-03-16 NOTE — PLAN OF CARE
Problem: Safety - Adult  Goal: Free from fall injury  Outcome: Progressing, Patient remains free of incidence/ injury. Bed remains in low position. Side rails up x2.

## 2024-03-16 NOTE — H&P
Value Ref Range    POC Glucose 152 (H) 75 - 110 mg/dL   Lactic Acid    Collection Time: 03/15/24  4:32 PM   Result Value Ref Range    Lactic Acid 1.2 0.5 - 2.2 mmol/L   POC Glucose Fingerstick    Collection Time: 03/15/24  4:32 PM   Result Value Ref Range    POC Glucose 182 (H) 75 - 110 mg/dL   POC Glucose Fingerstick    Collection Time: 03/15/24  8:26 PM   Result Value Ref Range    POC Glucose 197 (H) 75 - 110 mg/dL         Imaging/Diagonstics:  CT HEAD WO CONTRAST    Result Date: 3/15/2024  EXAMINATION: CT OF THE HEAD WITHOUT CONTRAST  3/14/2024 7:57 pm TECHNIQUE: CT of the head was performed without the administration of intravenous contrast. Automated exposure control, iterative reconstruction, and/or weight based adjustment of the mA/kV was utilized to reduce the radiation dose to as low as reasonably achievable. COMPARISON: None. HISTORY: ORDERING SYSTEM PROVIDED HISTORY: fatigue TECHNOLOGIST PROVIDED HISTORY: fatigue Decision Support Exception - unselect if not a suspected or confirmed emergency medical condition->Emergency Medical Condition (MA) Reason for Exam: fatigue, hyperglycemia FINDINGS: BRAIN/VENTRICLES: No evidence of intracranial hemorrhage.  Moderate cortical atrophy changes in both cerebral hemispheres.  Moderate central atrophy.  No midline shift. Mild-to-moderate chronic microvascular ischemic changes in the central and periventricular white matter of both cerebral hemispheres. No evidence of intracranial mass, subdural or epidural hematoma or subdural hygroma.  No acute process in brainstem or in cerebellum.  Mild volume loss in cerebellum noted. ORBITS: The visualized portion of the orbits demonstrate no acute abnormality. SINUSES: Complete opacified right maxillary sinus, indicating chronic sinusitis.  Rest of the paranasal sinuses are clear. SOFT TISSUES/SKULL:  No acute abnormality of the visualized skull or soft tissues.     No acute intracranial abnormality.  No intracranial hemorrhage.

## 2024-03-17 LAB
ANION GAP SERPL CALCULATED.3IONS-SCNC: 13 MMOL/L (ref 9–17)
BASOPHILS # BLD: 0 K/UL (ref 0–0.2)
BASOPHILS NFR BLD: 0 % (ref 0–2)
BUN SERPL-MCNC: 15 MG/DL (ref 8–23)
CALCIUM SERPL-MCNC: 7.9 MG/DL (ref 8.6–10.4)
CHLORIDE SERPL-SCNC: 102 MMOL/L (ref 98–107)
CO2 SERPL-SCNC: 18 MMOL/L (ref 20–31)
CREAT SERPL-MCNC: 1.1 MG/DL (ref 0.7–1.2)
EOSINOPHIL # BLD: 0.1 K/UL (ref 0–0.4)
EOSINOPHILS RELATIVE PERCENT: 1 % (ref 0–4)
ERYTHROCYTE [DISTWIDTH] IN BLOOD BY AUTOMATED COUNT: 15.9 % (ref 11.5–14.9)
GFR SERPL CREATININE-BSD FRML MDRD: >60 ML/MIN/1.73M2
GLUCOSE BLD-MCNC: 175 MG/DL (ref 75–110)
GLUCOSE BLD-MCNC: 203 MG/DL (ref 75–110)
GLUCOSE BLD-MCNC: 241 MG/DL (ref 75–110)
GLUCOSE BLD-MCNC: 273 MG/DL (ref 75–110)
GLUCOSE SERPL-MCNC: 172 MG/DL (ref 70–99)
HCT VFR BLD AUTO: 32.2 % (ref 41–53)
HGB BLD-MCNC: 10.4 G/DL (ref 13.5–17.5)
LYMPHOCYTES NFR BLD: 0.6 K/UL (ref 1–4.8)
LYMPHOCYTES RELATIVE PERCENT: 5 % (ref 24–44)
MCH RBC QN AUTO: 30.5 PG (ref 26–34)
MCHC RBC AUTO-ENTMCNC: 32.3 G/DL (ref 31–37)
MCV RBC AUTO: 94.6 FL (ref 80–100)
MICROORGANISM SPEC CULT: NO GROWTH
MONOCYTES NFR BLD: 0.5 K/UL (ref 0.1–1.3)
MONOCYTES NFR BLD: 5 % (ref 1–7)
NEUTROPHILS NFR BLD: 89 % (ref 36–66)
NEUTS SEG NFR BLD: 9.2 K/UL (ref 1.3–9.1)
PLATELET # BLD AUTO: 299 K/UL (ref 150–450)
PMV BLD AUTO: 7.2 FL (ref 6–12)
POTASSIUM SERPL-SCNC: 3.7 MMOL/L (ref 3.7–5.3)
RBC # BLD AUTO: 3.4 M/UL (ref 4.5–5.9)
SODIUM SERPL-SCNC: 133 MMOL/L (ref 135–144)
SPECIMEN DESCRIPTION: NORMAL
WBC OTHER # BLD: 10.4 K/UL (ref 3.5–11)

## 2024-03-17 PROCEDURE — 6370000000 HC RX 637 (ALT 250 FOR IP): Performed by: STUDENT IN AN ORGANIZED HEALTH CARE EDUCATION/TRAINING PROGRAM

## 2024-03-17 PROCEDURE — 2580000003 HC RX 258: Performed by: STUDENT IN AN ORGANIZED HEALTH CARE EDUCATION/TRAINING PROGRAM

## 2024-03-17 PROCEDURE — 6360000002 HC RX W HCPCS: Performed by: STUDENT IN AN ORGANIZED HEALTH CARE EDUCATION/TRAINING PROGRAM

## 2024-03-17 PROCEDURE — 1200000000 HC SEMI PRIVATE

## 2024-03-17 PROCEDURE — 80048 BASIC METABOLIC PNL TOTAL CA: CPT

## 2024-03-17 PROCEDURE — 85025 COMPLETE CBC W/AUTO DIFF WBC: CPT

## 2024-03-17 PROCEDURE — 82947 ASSAY GLUCOSE BLOOD QUANT: CPT

## 2024-03-17 PROCEDURE — 99232 SBSQ HOSP IP/OBS MODERATE 35: CPT | Performed by: FAMILY MEDICINE

## 2024-03-17 PROCEDURE — 36415 COLL VENOUS BLD VENIPUNCTURE: CPT

## 2024-03-17 RX ADMIN — TICAGRELOR 90 MG: 90 TABLET ORAL at 20:58

## 2024-03-17 RX ADMIN — BENZONATATE 200 MG: 200 CAPSULE ORAL at 20:58

## 2024-03-17 RX ADMIN — METOPROLOL SUCCINATE 25 MG: 25 TABLET, EXTENDED RELEASE ORAL at 09:06

## 2024-03-17 RX ADMIN — INSULIN LISPRO 4 UNITS: 100 INJECTION, SOLUTION INTRAVENOUS; SUBCUTANEOUS at 12:17

## 2024-03-17 RX ADMIN — ISOSORBIDE MONONITRATE 30 MG: 30 TABLET, EXTENDED RELEASE ORAL at 09:06

## 2024-03-17 RX ADMIN — CEFTRIAXONE SODIUM 1000 MG: 1 INJECTION, POWDER, FOR SOLUTION INTRAMUSCULAR; INTRAVENOUS at 22:16

## 2024-03-17 RX ADMIN — INSULIN LISPRO 4 UNITS: 100 INJECTION, SOLUTION INTRAVENOUS; SUBCUTANEOUS at 17:36

## 2024-03-17 RX ADMIN — TICAGRELOR 90 MG: 90 TABLET ORAL at 09:09

## 2024-03-17 RX ADMIN — ENOXAPARIN SODIUM 40 MG: 100 INJECTION SUBCUTANEOUS at 09:05

## 2024-03-17 RX ADMIN — INSULIN GLARGINE 20 UNITS: 100 INJECTION, SOLUTION SUBCUTANEOUS at 11:28

## 2024-03-17 RX ADMIN — ROSUVASTATIN 40 MG: 40 TABLET, FILM COATED ORAL at 09:06

## 2024-03-17 RX ADMIN — ASPIRIN 81 MG: 81 TABLET, CHEWABLE ORAL at 09:06

## 2024-03-17 RX ADMIN — LOSARTAN POTASSIUM 50 MG: 50 TABLET, FILM COATED ORAL at 09:06

## 2024-03-17 RX ADMIN — LEVOTHYROXINE SODIUM 88 MCG: 0.09 TABLET ORAL at 09:06

## 2024-03-17 NOTE — PLAN OF CARE
Problem: Safety - Adult  Goal: Free from fall injury  3/17/2024 1516 by John Paige, RN  Outcome: Progressing, Patient remains free of incidence/ injury. Bed remains in low position. Side rails up x2.        Problem: Skin/Tissue Integrity  Goal: Absence of new skin breakdown  Description: 1.  Monitor for areas of redness and/or skin breakdown  2.  Assess vascular access sites hourly  3.  Every 4-6 hours minimum:  Change oxygen saturation probe site  4.  Every 4-6 hours:  If on nasal continuous positive airway pressure, respiratory therapy assess nares and determine need for appliance change or resting period.  3/17/2024 1516 by John Paige, RN  Outcome: Progressing, Pt remain free of skin breakdown. Educated on the importance of turning and alternating position.

## 2024-03-17 NOTE — PLAN OF CARE
Problem: Discharge Planning  Goal: Discharge to home or other facility with appropriate resources  3/17/2024 0409 by Oppenheim, Evan, RN  Outcome: Progressing  Flowsheets (Taken 3/16/2024 2115)  Discharge to home or other facility with appropriate resources:   Arrange for needed discharge resources and transportation as appropriate   Identify barriers to discharge with patient and caregiver  3/16/2024 1617 by John Paige RN  Outcome: Progressing     Problem: ABCDS Injury Assessment  Goal: Absence of physical injury  3/17/2024 0409 by Oppenheim, Evan, RN  Outcome: Progressing  Flowsheets (Taken 3/16/2024 2344)  Absence of Physical Injury: Implement safety measures based on patient assessment  3/16/2024 1617 by John Paige RN  Outcome: Progressing     Problem: Safety - Adult  Goal: Free from fall injury  3/17/2024 0409 by Oppenheim, Evan, RN  Outcome: Progressing  Flowsheets (Taken 3/16/2024 2344)  Free From Fall Injury: Instruct family/caregiver on patient safety  3/16/2024 1617 by John Paige RN  Outcome: Progressing     Problem: Skin/Tissue Integrity  Goal: Absence of new skin breakdown  Description: 1.  Monitor for areas of redness and/or skin breakdown  2.  Assess vascular access sites hourly  3.  Every 4-6 hours minimum:  Change oxygen saturation probe site  4.  Every 4-6 hours:  If on nasal continuous positive airway pressure, respiratory therapy assess nares and determine need for appliance change or resting period.  Outcome: Progressing

## 2024-03-18 LAB
ANION GAP SERPL CALCULATED.3IONS-SCNC: 13 MMOL/L (ref 9–17)
BASOPHILS # BLD: 0 K/UL (ref 0–0.2)
BASOPHILS NFR BLD: 0 % (ref 0–2)
BUN SERPL-MCNC: 13 MG/DL (ref 8–23)
CALCIUM SERPL-MCNC: 8.1 MG/DL (ref 8.6–10.4)
CHLORIDE SERPL-SCNC: 101 MMOL/L (ref 98–107)
CO2 SERPL-SCNC: 20 MMOL/L (ref 20–31)
CREAT SERPL-MCNC: 1.1 MG/DL (ref 0.7–1.2)
EOSINOPHIL # BLD: 0.2 K/UL (ref 0–0.4)
EOSINOPHILS RELATIVE PERCENT: 2 % (ref 0–4)
ERYTHROCYTE [DISTWIDTH] IN BLOOD BY AUTOMATED COUNT: 15.9 % (ref 11.5–14.9)
GFR SERPL CREATININE-BSD FRML MDRD: >60 ML/MIN/1.73M2
GLUCOSE BLD-MCNC: 132 MG/DL (ref 75–110)
GLUCOSE BLD-MCNC: 173 MG/DL (ref 75–110)
GLUCOSE BLD-MCNC: 203 MG/DL (ref 75–110)
GLUCOSE BLD-MCNC: 314 MG/DL (ref 75–110)
GLUCOSE SERPL-MCNC: 155 MG/DL (ref 70–99)
HCT VFR BLD AUTO: 36.7 % (ref 41–53)
HGB BLD-MCNC: 11.3 G/DL (ref 13.5–17.5)
LYMPHOCYTES NFR BLD: 0.6 K/UL (ref 1–4.8)
LYMPHOCYTES RELATIVE PERCENT: 7 % (ref 24–44)
MCH RBC QN AUTO: 30.3 PG (ref 26–34)
MCHC RBC AUTO-ENTMCNC: 30.8 G/DL (ref 31–37)
MCV RBC AUTO: 98.4 FL (ref 80–100)
MONOCYTES NFR BLD: 0.4 K/UL (ref 0.1–1.3)
MONOCYTES NFR BLD: 5 % (ref 1–7)
NEUTROPHILS NFR BLD: 86 % (ref 36–66)
NEUTS SEG NFR BLD: 7.2 K/UL (ref 1.3–9.1)
PLATELET # BLD AUTO: 284 K/UL (ref 150–450)
PMV BLD AUTO: 7.2 FL (ref 6–12)
POTASSIUM SERPL-SCNC: 3.6 MMOL/L (ref 3.7–5.3)
RBC # BLD AUTO: 3.73 M/UL (ref 4.5–5.9)
SODIUM SERPL-SCNC: 134 MMOL/L (ref 135–144)
WBC OTHER # BLD: 8.4 K/UL (ref 3.5–11)

## 2024-03-18 PROCEDURE — 36415 COLL VENOUS BLD VENIPUNCTURE: CPT

## 2024-03-18 PROCEDURE — 1200000000 HC SEMI PRIVATE

## 2024-03-18 PROCEDURE — 80048 BASIC METABOLIC PNL TOTAL CA: CPT

## 2024-03-18 PROCEDURE — 82947 ASSAY GLUCOSE BLOOD QUANT: CPT

## 2024-03-18 PROCEDURE — 2580000003 HC RX 258: Performed by: STUDENT IN AN ORGANIZED HEALTH CARE EDUCATION/TRAINING PROGRAM

## 2024-03-18 PROCEDURE — 97110 THERAPEUTIC EXERCISES: CPT

## 2024-03-18 PROCEDURE — 6370000000 HC RX 637 (ALT 250 FOR IP): Performed by: STUDENT IN AN ORGANIZED HEALTH CARE EDUCATION/TRAINING PROGRAM

## 2024-03-18 PROCEDURE — 92526 ORAL FUNCTION THERAPY: CPT

## 2024-03-18 PROCEDURE — 97530 THERAPEUTIC ACTIVITIES: CPT

## 2024-03-18 PROCEDURE — 99232 SBSQ HOSP IP/OBS MODERATE 35: CPT | Performed by: FAMILY MEDICINE

## 2024-03-18 PROCEDURE — 6360000002 HC RX W HCPCS: Performed by: STUDENT IN AN ORGANIZED HEALTH CARE EDUCATION/TRAINING PROGRAM

## 2024-03-18 PROCEDURE — 85025 COMPLETE CBC W/AUTO DIFF WBC: CPT

## 2024-03-18 RX ADMIN — INSULIN LISPRO 12 UNITS: 100 INJECTION, SOLUTION INTRAVENOUS; SUBCUTANEOUS at 12:37

## 2024-03-18 RX ADMIN — TICAGRELOR 90 MG: 90 TABLET ORAL at 21:39

## 2024-03-18 RX ADMIN — ENOXAPARIN SODIUM 40 MG: 100 INJECTION SUBCUTANEOUS at 08:04

## 2024-03-18 RX ADMIN — TICAGRELOR 90 MG: 90 TABLET ORAL at 08:04

## 2024-03-18 RX ADMIN — SODIUM CHLORIDE, PRESERVATIVE FREE 10 ML: 5 INJECTION INTRAVENOUS at 08:07

## 2024-03-18 RX ADMIN — ROSUVASTATIN 40 MG: 40 TABLET, FILM COATED ORAL at 08:04

## 2024-03-18 RX ADMIN — CEFTRIAXONE SODIUM 1000 MG: 1 INJECTION, POWDER, FOR SOLUTION INTRAMUSCULAR; INTRAVENOUS at 21:42

## 2024-03-18 RX ADMIN — POTASSIUM CHLORIDE 40 MEQ: 1500 TABLET, EXTENDED RELEASE ORAL at 11:28

## 2024-03-18 RX ADMIN — LEVOTHYROXINE SODIUM 88 MCG: 0.09 TABLET ORAL at 08:04

## 2024-03-18 RX ADMIN — METOPROLOL SUCCINATE 25 MG: 25 TABLET, EXTENDED RELEASE ORAL at 08:04

## 2024-03-18 RX ADMIN — INSULIN GLARGINE 20 UNITS: 100 INJECTION, SOLUTION SUBCUTANEOUS at 11:28

## 2024-03-18 RX ADMIN — LOSARTAN POTASSIUM 50 MG: 50 TABLET, FILM COATED ORAL at 08:04

## 2024-03-18 RX ADMIN — ASPIRIN 81 MG: 81 TABLET, CHEWABLE ORAL at 08:04

## 2024-03-18 RX ADMIN — INSULIN LISPRO 4 UNITS: 100 INJECTION, SOLUTION INTRAVENOUS; SUBCUTANEOUS at 17:37

## 2024-03-18 RX ADMIN — ISOSORBIDE MONONITRATE 30 MG: 30 TABLET, EXTENDED RELEASE ORAL at 08:03

## 2024-03-18 NOTE — DISCHARGE INSTR - COC
Assisted  Feeding  Assisted  Med Admin  Assisted  Med Delivery   whole    Wound Care Documentation and Therapy:        Elimination:  Continence:   Bowel: Yes  Bladder: No  Urinary Catheter: None   Colostomy/Ileostomy/Ileal Conduit: No       Date of Last BM: [phone]    Intake/Output Summary (Last 24 hours) at 3/18/2024 0931  Last data filed at 3/17/2024 2216  Gross per 24 hour   Intake --   Output 700 ml   Net -700 ml     I/O last 3 completed shifts:  In: -   Out: 1500 [Urine:1500]    Safety Concerns:     At Risk for Falls    Impairments/Disabilities:      memory    Nutrition Therapy:  Current Nutrition Therapy:   - Oral Diet:  General    Routes of Feeding: Oral  Liquids: No Restrictions  Daily Fluid Restriction: no  Last Modified Barium Swallow with Video (Video Swallowing Test): not done    Treatments at the Time of Hospital Discharge:   Respiratory Treatments: no  Oxygen Therapy:  is not on home oxygen therapy.  Ventilator:    - No ventilator support    Rehab Therapies: Physical Therapy and Occupational Therapy  Weight Bearing Status/Restrictions: No weight bearing restrictions  Other Medical Equipment (for information only, NOT a DME order):  walker  Other Treatments: Skilled Nursing assessment and monitoring. Medication education and monitoring per protocol.      Patient's personal belongings (please select all that are sent with patient):  Dentures upper & lower    RN SIGNATURE:  Electronically signed by Sangeetha Vázquez RN on 3/21/24 at 11:25 AM EDT    CASE MANAGEMENT/SOCIAL WORK SECTION    Inpatient Status Date: 3/15/24    Readmission Risk Assessment Score:  Readmission Risk              Risk of Unplanned Readmission:  16           Discharging to Facility/ Agency   [facility].   Phone: [phone]  Fax: [phone]    Dialysis Facility (if applicable)   Name:  Address:  Dialysis Schedule:  Phone:  Fax:    / signature: Electronically signed by Hannah Gloria RN on

## 2024-03-18 NOTE — PLAN OF CARE
Problem: Discharge Planning  Goal: Discharge to home or other facility with appropriate resources  3/18/2024 0030 by Gino Starkey RN  Outcome: Progressing  3/17/2024 1516 by John Paige RN  Outcome: Progressing     Problem: ABCDS Injury Assessment  Goal: Absence of physical injury  3/18/2024 0030 by Gino Starkey RN  Outcome: Progressing  3/17/2024 1516 by John Paige RN  Outcome: Progressing     Problem: Safety - Adult  Goal: Free from fall injury  3/18/2024 0030 by Gino Starkey RN  Outcome: Progressing  3/17/2024 1516 by John Paige RN  Outcome: Progressing     Problem: Skin/Tissue Integrity  Goal: Absence of new skin breakdown  Description: 1.  Monitor for areas of redness and/or skin breakdown  2.  Assess vascular access sites hourly  3.  Every 4-6 hours minimum:  Change oxygen saturation probe site  4.  Every 4-6 hours:  If on nasal continuous positive airway pressure, respiratory therapy assess nares and determine need for appliance change or resting period.  3/18/2024 0030 by Gino Starkey RN  Outcome: Progressing  3/17/2024 1516 by John Paige RN  Outcome: Progressing

## 2024-03-18 NOTE — PLAN OF CARE
Problem: Safety - Adult  Goal: Free from fall injury  3/18/2024 1424 by John Paige, RN  Outcome: Progressing, Patient remains free of incidence/ injury. Bed remains in low position. Side rails up x2.       Problem: Skin/Tissue Integrity  Goal: Absence of new skin breakdown  Description: 1.  Monitor for areas of redness and/or skin breakdown  2.  Assess vascular access sites hourly  3.  Every 4-6 hours minimum:  Change oxygen saturation probe site  4.  Every 4-6 hours:  If on nasal continuous positive airway pressure, respiratory therapy assess nares and determine need for appliance change or resting period.  3/18/2024 1424 by John Paige, RN  Outcome: Progressing, Pt remain free of skin breakdown. Educated on the importance of turning and alternating position.

## 2024-03-19 LAB
BASOPHILS # BLD: 0 K/UL (ref 0–0.2)
BASOPHILS NFR BLD: 0 % (ref 0–2)
EOSINOPHIL # BLD: 0.1 K/UL (ref 0–0.4)
EOSINOPHILS RELATIVE PERCENT: 2 % (ref 0–4)
ERYTHROCYTE [DISTWIDTH] IN BLOOD BY AUTOMATED COUNT: 15.7 % (ref 11.5–14.9)
GLUCOSE BLD-MCNC: 115 MG/DL (ref 75–110)
GLUCOSE BLD-MCNC: 191 MG/DL (ref 75–110)
GLUCOSE BLD-MCNC: 209 MG/DL (ref 75–110)
GLUCOSE BLD-MCNC: 281 MG/DL (ref 75–110)
HCT VFR BLD AUTO: 36 % (ref 41–53)
HGB BLD-MCNC: 11.5 G/DL (ref 13.5–17.5)
LYMPHOCYTES NFR BLD: 0.5 K/UL (ref 1–4.8)
LYMPHOCYTES RELATIVE PERCENT: 6 % (ref 24–44)
MCH RBC QN AUTO: 31 PG (ref 26–34)
MCHC RBC AUTO-ENTMCNC: 32 G/DL (ref 31–37)
MCV RBC AUTO: 96.9 FL (ref 80–100)
MONOCYTES NFR BLD: 0.4 K/UL (ref 0.1–1.3)
MONOCYTES NFR BLD: 5 % (ref 1–7)
NEUTROPHILS NFR BLD: 87 % (ref 36–66)
NEUTS SEG NFR BLD: 6.7 K/UL (ref 1.3–9.1)
PLATELET # BLD AUTO: 283 K/UL (ref 150–450)
PMV BLD AUTO: 7.3 FL (ref 6–12)
RBC # BLD AUTO: 3.72 M/UL (ref 4.5–5.9)
WBC OTHER # BLD: 7.7 K/UL (ref 3.5–11)

## 2024-03-19 PROCEDURE — 1200000000 HC SEMI PRIVATE

## 2024-03-19 PROCEDURE — 36415 COLL VENOUS BLD VENIPUNCTURE: CPT

## 2024-03-19 PROCEDURE — 97530 THERAPEUTIC ACTIVITIES: CPT

## 2024-03-19 PROCEDURE — 82947 ASSAY GLUCOSE BLOOD QUANT: CPT

## 2024-03-19 PROCEDURE — 97116 GAIT TRAINING THERAPY: CPT

## 2024-03-19 PROCEDURE — 92526 ORAL FUNCTION THERAPY: CPT

## 2024-03-19 PROCEDURE — 2580000003 HC RX 258: Performed by: STUDENT IN AN ORGANIZED HEALTH CARE EDUCATION/TRAINING PROGRAM

## 2024-03-19 PROCEDURE — 85025 COMPLETE CBC W/AUTO DIFF WBC: CPT

## 2024-03-19 PROCEDURE — 6360000002 HC RX W HCPCS: Performed by: STUDENT IN AN ORGANIZED HEALTH CARE EDUCATION/TRAINING PROGRAM

## 2024-03-19 PROCEDURE — 99232 SBSQ HOSP IP/OBS MODERATE 35: CPT | Performed by: FAMILY MEDICINE

## 2024-03-19 PROCEDURE — 6370000000 HC RX 637 (ALT 250 FOR IP): Performed by: STUDENT IN AN ORGANIZED HEALTH CARE EDUCATION/TRAINING PROGRAM

## 2024-03-19 RX ADMIN — CEFTRIAXONE SODIUM 1000 MG: 1 INJECTION, POWDER, FOR SOLUTION INTRAMUSCULAR; INTRAVENOUS at 22:13

## 2024-03-19 RX ADMIN — METOPROLOL SUCCINATE 25 MG: 25 TABLET, EXTENDED RELEASE ORAL at 08:52

## 2024-03-19 RX ADMIN — ENOXAPARIN SODIUM 40 MG: 100 INJECTION SUBCUTANEOUS at 08:52

## 2024-03-19 RX ADMIN — TICAGRELOR 90 MG: 90 TABLET ORAL at 22:12

## 2024-03-19 RX ADMIN — INSULIN LISPRO 8 UNITS: 100 INJECTION, SOLUTION INTRAVENOUS; SUBCUTANEOUS at 17:08

## 2024-03-19 RX ADMIN — SODIUM CHLORIDE, PRESERVATIVE FREE 10 ML: 5 INJECTION INTRAVENOUS at 08:53

## 2024-03-19 RX ADMIN — ASPIRIN 81 MG: 81 TABLET, CHEWABLE ORAL at 08:52

## 2024-03-19 RX ADMIN — TICAGRELOR 90 MG: 90 TABLET ORAL at 08:52

## 2024-03-19 RX ADMIN — INSULIN GLARGINE 20 UNITS: 100 INJECTION, SOLUTION SUBCUTANEOUS at 11:45

## 2024-03-19 RX ADMIN — ROSUVASTATIN 40 MG: 40 TABLET, FILM COATED ORAL at 08:52

## 2024-03-19 RX ADMIN — LEVOTHYROXINE SODIUM 88 MCG: 0.09 TABLET ORAL at 08:52

## 2024-03-19 RX ADMIN — INSULIN LISPRO 4 UNITS: 100 INJECTION, SOLUTION INTRAVENOUS; SUBCUTANEOUS at 11:44

## 2024-03-19 RX ADMIN — ISOSORBIDE MONONITRATE 30 MG: 30 TABLET, EXTENDED RELEASE ORAL at 08:52

## 2024-03-19 RX ADMIN — LOSARTAN POTASSIUM 50 MG: 50 TABLET, FILM COATED ORAL at 08:52

## 2024-03-19 NOTE — PLAN OF CARE
Problem: Discharge Planning  Goal: Discharge to home or other facility with appropriate resources  3/19/2024 0338 by Lissette Anderson RN  Outcome: Progressing  Flowsheets (Taken 3/19/2024 0338)  Discharge to home or other facility with appropriate resources:   Identify barriers to discharge with patient and caregiver   Arrange for needed discharge resources and transportation as appropriate   Identify discharge learning needs (meds, wound care, etc)   Refer to discharge planning if patient needs post-hospital services based on physician order or complex needs related to functional status, cognitive ability or social support system  3/18/2024 1424 by John Paige RN  Outcome: Progressing     Problem: ABCDS Injury Assessment  Goal: Absence of physical injury  3/19/2024 0338 by Lissette Anderson RN  Outcome: Progressing  Flowsheets (Taken 3/19/2024 0338)  Absence of Physical Injury: Implement safety measures based on patient assessment  3/18/2024 1424 by John Paige RN  Outcome: Progressing     Problem: Safety - Adult  Goal: Free from fall injury  3/19/2024 0338 by Lissette Anderson RN  Outcome: Progressing  Flowsheets (Taken 3/19/2024 0338)  Free From Fall Injury: Instruct family/caregiver on patient safety  3/18/2024 1424 by John Paige RN  Outcome: Progressing     Problem: Skin/Tissue Integrity  Goal: Absence of new skin breakdown  Description: 1.  Monitor for areas of redness and/or skin breakdown  2.  Assess vascular access sites hourly  3.  Every 4-6 hours minimum:  Change oxygen saturation probe site  4.  Every 4-6 hours:  If on nasal continuous positive airway pressure, respiratory therapy assess nares and determine need for appliance change or resting period.  3/19/2024 0338 by Lissette Anderson RN  Outcome: Progressing  Note: Monitoring skin  3/18/2024 1424 by John Paige RN  Outcome: Progressing     Problem: Chronic Conditions and

## 2024-03-19 NOTE — PLAN OF CARE
Problem: Discharge Planning  Goal: Discharge to home or other facility with appropriate resources  3/19/2024 1405 by China Lockett RN  Outcome: Progressing  3/19/2024 0338 by Lissette Anderson RN  Outcome: Progressing  Flowsheets (Taken 3/19/2024 0338)  Discharge to home or other facility with appropriate resources:   Identify barriers to discharge with patient and caregiver   Arrange for needed discharge resources and transportation as appropriate   Identify discharge learning needs (meds, wound care, etc)   Refer to discharge planning if patient needs post-hospital services based on physician order or complex needs related to functional status, cognitive ability or social support system     Problem: ABCDS Injury Assessment  Goal: Absence of physical injury  3/19/2024 1405 by China Lockett RN  Outcome: Progressing  3/19/2024 0338 by Lissette Anderson RN  Outcome: Progressing  Flowsheets (Taken 3/19/2024 0338)  Absence of Physical Injury: Implement safety measures based on patient assessment     Problem: Safety - Adult  Goal: Free from fall injury  3/19/2024 1405 by China Lockett RN  Outcome: Progressing  3/19/2024 0338 by Lissette Anderson RN  Outcome: Progressing  Flowsheets (Taken 3/19/2024 0338)  Free From Fall Injury: Instruct family/caregiver on patient safety     Problem: Skin/Tissue Integrity  Goal: Absence of new skin breakdown  Description: 1.  Monitor for areas of redness and/or skin breakdown  2.  Assess vascular access sites hourly  3.  Every 4-6 hours minimum:  Change oxygen saturation probe site  4.  Every 4-6 hours:  If on nasal continuous positive airway pressure, respiratory therapy assess nares and determine need for appliance change or resting period.  3/19/2024 1405 by China Lockett RN  Outcome: Progressing  3/19/2024 0338 by Lissette Anderson RN  Outcome: Progressing  Note: Monitoring skin     Problem: Chronic Conditions and

## 2024-03-20 LAB
GLUCOSE BLD-MCNC: 176 MG/DL (ref 75–110)
GLUCOSE BLD-MCNC: 188 MG/DL (ref 75–110)
GLUCOSE BLD-MCNC: 314 MG/DL (ref 75–110)
GLUCOSE BLD-MCNC: 88 MG/DL (ref 75–110)

## 2024-03-20 PROCEDURE — 2580000003 HC RX 258: Performed by: STUDENT IN AN ORGANIZED HEALTH CARE EDUCATION/TRAINING PROGRAM

## 2024-03-20 PROCEDURE — 97530 THERAPEUTIC ACTIVITIES: CPT

## 2024-03-20 PROCEDURE — 1200000000 HC SEMI PRIVATE

## 2024-03-20 PROCEDURE — 97110 THERAPEUTIC EXERCISES: CPT

## 2024-03-20 PROCEDURE — 99232 SBSQ HOSP IP/OBS MODERATE 35: CPT | Performed by: FAMILY MEDICINE

## 2024-03-20 PROCEDURE — 97116 GAIT TRAINING THERAPY: CPT

## 2024-03-20 PROCEDURE — 6360000002 HC RX W HCPCS: Performed by: STUDENT IN AN ORGANIZED HEALTH CARE EDUCATION/TRAINING PROGRAM

## 2024-03-20 PROCEDURE — 6370000000 HC RX 637 (ALT 250 FOR IP): Performed by: STUDENT IN AN ORGANIZED HEALTH CARE EDUCATION/TRAINING PROGRAM

## 2024-03-20 PROCEDURE — 82947 ASSAY GLUCOSE BLOOD QUANT: CPT

## 2024-03-20 RX ORDER — BENZONATATE 200 MG/1
200 CAPSULE ORAL 3 TIMES DAILY PRN
Qty: 21 CAPSULE | Refills: 0
Start: 2024-03-20 | End: 2024-03-27

## 2024-03-20 RX ADMIN — SODIUM CHLORIDE, PRESERVATIVE FREE 10 ML: 5 INJECTION INTRAVENOUS at 20:50

## 2024-03-20 RX ADMIN — ISOSORBIDE MONONITRATE 30 MG: 30 TABLET, EXTENDED RELEASE ORAL at 09:15

## 2024-03-20 RX ADMIN — TICAGRELOR 90 MG: 90 TABLET ORAL at 20:50

## 2024-03-20 RX ADMIN — INSULIN GLARGINE 20 UNITS: 100 INJECTION, SOLUTION SUBCUTANEOUS at 12:26

## 2024-03-20 RX ADMIN — LOSARTAN POTASSIUM 50 MG: 50 TABLET, FILM COATED ORAL at 09:15

## 2024-03-20 RX ADMIN — METOPROLOL SUCCINATE 25 MG: 25 TABLET, EXTENDED RELEASE ORAL at 09:15

## 2024-03-20 RX ADMIN — TICAGRELOR 90 MG: 90 TABLET ORAL at 09:15

## 2024-03-20 RX ADMIN — LEVOTHYROXINE SODIUM 88 MCG: 0.09 TABLET ORAL at 09:15

## 2024-03-20 RX ADMIN — INSULIN LISPRO 12 UNITS: 100 INJECTION, SOLUTION INTRAVENOUS; SUBCUTANEOUS at 17:03

## 2024-03-20 RX ADMIN — ENOXAPARIN SODIUM 40 MG: 100 INJECTION SUBCUTANEOUS at 09:15

## 2024-03-20 RX ADMIN — SODIUM CHLORIDE, PRESERVATIVE FREE 10 ML: 5 INJECTION INTRAVENOUS at 09:15

## 2024-03-20 RX ADMIN — ROSUVASTATIN 40 MG: 40 TABLET, FILM COATED ORAL at 09:15

## 2024-03-20 RX ADMIN — ASPIRIN 81 MG: 81 TABLET, CHEWABLE ORAL at 09:15

## 2024-03-20 NOTE — PLAN OF CARE
Problem: Discharge Planning  Goal: Discharge to home or other facility with appropriate resources  Outcome: Progressing  Flowsheets (Taken 3/20/2024 0420)  Discharge to home or other facility with appropriate resources:   Identify barriers to discharge with patient and caregiver   Arrange for needed discharge resources and transportation as appropriate   Identify discharge learning needs (meds, wound care, etc)   Refer to discharge planning if patient needs post-hospital services based on physician order or complex needs related to functional status, cognitive ability or social support system     Problem: ABCDS Injury Assessment  Goal: Absence of physical injury  Outcome: Progressing  Flowsheets (Taken 3/20/2024 0420)  Absence of Physical Injury: Implement safety measures based on patient assessment     Problem: Safety - Adult  Goal: Free from fall injury  Outcome: Progressing  Flowsheets (Taken 3/20/2024 0420)  Free From Fall Injury: Instruct family/caregiver on patient safety     Problem: Skin/Tissue Integrity  Goal: Absence of new skin breakdown  Description: 1.  Monitor for areas of redness and/or skin breakdown  2.  Assess vascular access sites hourly  3.  Every 4-6 hours minimum:  Change oxygen saturation probe site  4.  Every 4-6 hours:  If on nasal continuous positive airway pressure, respiratory therapy assess nares and determine need for appliance change or resting period.  Outcome: Progressing  Note: Monitoring skin     Problem: Chronic Conditions and Co-morbidities  Goal: Patient's chronic conditions and co-morbidity symptoms are monitored and maintained or improved  Outcome: Progressing  Flowsheets (Taken 3/20/2024 0420)  Care Plan - Patient's Chronic Conditions and Co-Morbidity Symptoms are Monitored and Maintained or Improved:   Monitor and assess patient's chronic conditions and comorbid symptoms for stability, deterioration, or improvement   Collaborate with multidisciplinary team to address

## 2024-03-21 VITALS
BODY MASS INDEX: 28.89 KG/M2 | OXYGEN SATURATION: 96 % | WEIGHT: 179 LBS | HEART RATE: 71 BPM | DIASTOLIC BLOOD PRESSURE: 54 MMHG | TEMPERATURE: 98.1 F | SYSTOLIC BLOOD PRESSURE: 114 MMHG | RESPIRATION RATE: 18 BRPM

## 2024-03-21 LAB
GLUCOSE BLD-MCNC: 190 MG/DL (ref 75–110)
GLUCOSE BLD-MCNC: 84 MG/DL (ref 75–110)

## 2024-03-21 PROCEDURE — 6370000000 HC RX 637 (ALT 250 FOR IP): Performed by: STUDENT IN AN ORGANIZED HEALTH CARE EDUCATION/TRAINING PROGRAM

## 2024-03-21 PROCEDURE — 6360000002 HC RX W HCPCS: Performed by: STUDENT IN AN ORGANIZED HEALTH CARE EDUCATION/TRAINING PROGRAM

## 2024-03-21 PROCEDURE — 2580000003 HC RX 258: Performed by: STUDENT IN AN ORGANIZED HEALTH CARE EDUCATION/TRAINING PROGRAM

## 2024-03-21 PROCEDURE — 82947 ASSAY GLUCOSE BLOOD QUANT: CPT

## 2024-03-21 PROCEDURE — 99239 HOSP IP/OBS DSCHRG MGMT >30: CPT | Performed by: FAMILY MEDICINE

## 2024-03-21 RX ADMIN — TICAGRELOR 90 MG: 90 TABLET ORAL at 10:12

## 2024-03-21 RX ADMIN — ROSUVASTATIN 40 MG: 40 TABLET, FILM COATED ORAL at 10:07

## 2024-03-21 RX ADMIN — LEVOTHYROXINE SODIUM 88 MCG: 0.09 TABLET ORAL at 10:12

## 2024-03-21 RX ADMIN — METOPROLOL SUCCINATE 25 MG: 25 TABLET, EXTENDED RELEASE ORAL at 10:07

## 2024-03-21 RX ADMIN — ASPIRIN 81 MG: 81 TABLET, CHEWABLE ORAL at 10:07

## 2024-03-21 RX ADMIN — SODIUM CHLORIDE, PRESERVATIVE FREE 10 ML: 5 INJECTION INTRAVENOUS at 10:12

## 2024-03-21 RX ADMIN — INSULIN GLARGINE 20 UNITS: 100 INJECTION, SOLUTION SUBCUTANEOUS at 12:31

## 2024-03-21 RX ADMIN — LOSARTAN POTASSIUM 50 MG: 50 TABLET, FILM COATED ORAL at 10:07

## 2024-03-21 RX ADMIN — ISOSORBIDE MONONITRATE 30 MG: 30 TABLET, EXTENDED RELEASE ORAL at 10:07

## 2024-03-21 RX ADMIN — ENOXAPARIN SODIUM 40 MG: 100 INJECTION SUBCUTANEOUS at 10:07

## 2024-03-21 NOTE — DISCHARGE SUMMARY
[facility] Discharge Summary      Patient ID: Doe Kapadia    MRN: [id number]     Acct:  [id number]       Patient's PCP: Zulma Hill MD    Admit Date: 3/14/2024     Discharge Date: 3/21/2024      Admitting Physician: Shayne Haney MD    Discharge Physician: Zulma Hill MD     Discharge Diagnoses:    Primary Problem  UTI (urinary tract infection)    Active Hospital Problems    Diagnosis Date Noted    UTI (urinary tract infection) [N39.0] 03/15/2024    JANIYA (acute kidney injury) (HCC) [N17.9] 03/15/2024    Weakness generalized [R53.1] 03/15/2024    Type 2 diabetes mellitus without complication, with long-term current use of insulin (HCC) [E11.9, Z79.4] 10/31/2016    Mixed hyperlipidemia [E78.2] 10/31/2016    Essential hypertension [I10] 03/21/2016     Past Medical History:   Diagnosis Date    Arthritis     CAD (coronary artery disease)     CHF (congestive heart failure) (HCC)     Diabetes mellitus (HCC)     History of blood transfusion     Hyperlipidemia     Hypertension     PVC (premature ventricular contraction)     SOB (shortness of breath)     Thyroid disease      The patient was seen and examined on day of discharge and this discharge summary is in conjunction with any daily progress note from day of discharge.    Code Status:  Full Code    Hospital Course: Uncomplicated, patient was admitted with a UTI. He responded well to antibiotics but he is weak.    Consults:  none    Significant Diagnostic Studies: as above, and as follows: see chart    Treatments: as above    Disposition: SNF    Discharged Condition: Stable    Follow Up:  Zulma Hill MD in one week    Discharge Medications:      Medication List        START taking these medications      benzonatate 200 MG capsule  Commonly known as: TESSALON  Take 1 capsule by mouth 3 times daily as needed for Cough            CONTINUE taking these medications      acetaminophen 500 MG tablet  Commonly known as: TYLENOL  Take 2

## 2024-03-21 NOTE — PROGRESS NOTES
FAMILY MEDICINE  - PROGRESS NOTE    Date:  3/16/2024  Doe SAMANTHA Kang  [id number]      Chief Complaint   Patient presents with    Fatigue    Hyperglycemia         Interval History:  Improved, patient sleeping soundly this am. No significant events overnight. Family concerned with patient coughing. CXR was clear. Discussed with nursing staff.      Subjective  Constitutional: positive for overweight  Eyes: positive for contacts/glasses  Respiratory: positive for cough  Genitourinary:positive for dysuria:    Objective:    /65   Pulse 100   Temp 98.2 °F (36.8 °C) (Oral)   Resp 18   Wt 81.2 kg (179 lb)   SpO2 97%   BMI 28.89 kg/m²   General appearance - alert, well appearing, and in no distress and overweight  Mental status - alert, oriented to person, place, and time  Eyes - not examined  Ears - hearing grossly normal bilaterally  Nose - normal and patent, no erythema, discharge or polyps  Mouth - mucous membranes moist, pharynx normal without lesions  Neck - supple, no significant adenopathy  Lymphatics - no palpable lymphadenopathy, no hepatosplenomegaly  Chest - clear to auscultation, no wheezes, rales or rhonchi, symmetric air entry, decreased air entry noted posteriorly  Heart - normal rate, regular rhythm, normal S1, S2, no murmurs, rubs, clicks or gallops  Abdomen - soft, nontender, nondistended, no masses or organomegaly  Breasts - not examined  Back exam - not examined  Neurological - alert, oriented, normal speech, no focal findings or movement disorder noted  Musculoskeletal - osteoarthritic changes noted in both hands  Extremities - peripheral pulses normal, no pedal edema, no clubbing or cyanosis  Skin - normal coloration and turgor, no rashes, no suspicious skin lesions noted    Data:   Medications:   Current Facility-Administered Medications   Medication Dose Route Frequency Provider Last Rate Last Admin    aspirin chewable tablet 81 mg  81 
                                                       FAMILY MEDICINE  - PROGRESS NOTE    Date:  3/20/2024  Doe Kapadia  [id number]      Chief Complaint   Patient presents with    Fatigue    Hyperglycemia         Interval History:  Improved, patient sleeping soundly this am. No significant events overnight. Patient has no complaints. Discussed with nursing staff.      Subjective  Constitutional: positive for overweight  Eyes: positive for contacts/glasses  Respiratory: positive for cough  Genitourinary:positive for dysuria:    Objective:    BP (!) 95/50   Pulse 73   Temp 97.5 °F (36.4 °C)   Resp 18   Wt 81.2 kg (179 lb)   SpO2 99%   BMI 28.89 kg/m²   General appearance - alert, well appearing, and in no distress and overweight  Mental status - alert, oriented to person, place, and time  Eyes - not examined  Ears - hearing grossly normal bilaterally  Nose - normal and patent, no erythema, discharge or polyps  Mouth - mucous membranes moist, pharynx normal without lesions  Neck - supple, no significant adenopathy  Lymphatics - no palpable lymphadenopathy, no hepatosplenomegaly  Chest - clear to auscultation, no wheezes, rales or rhonchi, symmetric air entry, decreased air entry noted posteriorly  Heart - normal rate, regular rhythm, normal S1, S2, no murmurs, rubs, clicks or gallops  Abdomen - soft, nontender, nondistended, no masses or organomegaly  Breasts - not examined  Back exam - not examined  Neurological - alert, oriented, normal speech, no focal findings or movement disorder noted  Musculoskeletal - osteoarthritic changes noted in both hands  Extremities - peripheral pulses normal, no pedal edema, no clubbing or cyanosis  Skin - normal coloration and turgor, no rashes, no suspicious skin lesions noted    Data:   Medications:   Current Facility-Administered Medications   Medication Dose Route Frequency Provider Last Rate Last Admin    aspirin chewable tablet 81 mg  81 mg Oral Daily Shayne Haney MD 
                                                       FAMILY MEDICINE  - PROGRESS NOTE    Date:  3/21/2024  Doe SAMANTHA Kang  [id number]      Chief Complaint   Patient presents with    Fatigue    Hyperglycemia         Interval History:  Improved, patient sleeping soundly this am. No significant events overnight. Patient has no complaints. Discussed with nursing staff.      Subjective  Constitutional: positive for overweight  Eyes: positive for contacts/glasses  Respiratory: positive for cough  Genitourinary:positive for dysuria:    Objective:    BP (!) 91/52   Pulse 78   Temp 97.5 °F (36.4 °C) (Oral)   Resp 18   Wt 81.2 kg (179 lb)   SpO2 98%   BMI 28.89 kg/m²   General appearance - alert, well appearing, and in no distress and overweight  Mental status - alert, oriented to person, place, and time  Eyes - not examined  Ears - hearing grossly normal bilaterally  Nose - normal and patent, no erythema, discharge or polyps  Mouth - mucous membranes moist, pharynx normal without lesions  Neck - supple, no significant adenopathy  Lymphatics - no palpable lymphadenopathy, no hepatosplenomegaly  Chest - clear to auscultation, no wheezes, rales or rhonchi, symmetric air entry, decreased air entry noted posteriorly  Heart - normal rate, regular rhythm, normal S1, S2, no murmurs, rubs, clicks or gallops  Abdomen - soft, nontender, nondistended, no masses or organomegaly  Breasts - not examined  Back exam - not examined  Neurological - alert, oriented, normal speech, no focal findings or movement disorder noted  Musculoskeletal - osteoarthritic changes noted in both hands  Extremities - peripheral pulses normal, no pedal edema, no clubbing or cyanosis  Skin - normal coloration and turgor, no rashes, no suspicious skin lesions noted    Data:   Medications:   Current Facility-Administered Medications   Medication Dose Route Frequency Provider Last Rate Last Admin    aspirin chewable tablet 81 mg  81 mg Oral Daily Med[place] 
   03/18/24 1854   Encounter Summary   Encounter Overview/Reason  Initial Encounter   Service Provided For: Patient and family together   Referral/Consult From: Family   Support System Spouse;Children   Last Encounter  03/18/24   Complexity of Encounter Moderate   Spiritual/Emotional needs   Type Spiritual Support   Assessment/Intervention/Outcome   Assessment Calm   Intervention Active listening;Discussed illness injury and it’s impact;Prayer (assurance of)/[place];Sustaining Presence/Ministry of presence   Outcome Coping;Engaged in conversation;Expressed feelings, needs, and concerns;Expressed Gratitude;Receptive       
[facility]    Date: 3/21/2024  Patient Name: Doe Kapadia        : 1940     Per chart: Transportation arranged for patient to go to [facility] at [facility] via [facility]. Facility informed. Bedside nurse informed.     Ira Rush, OT,   Date: 3/21/2024    
Occupational Therapy  [facility]   INPATIENT OCCUPATIONAL THERAPY  PROGRESS NOTE  Date: 3/20/2024  Patient Name: Doe Kapadia       Room:   MRN: [id number]    : 1940  (84 y.o.)  Gender: male   Referring Practitioner: Shayne Haney MD  Diagnosis: UTI      Discharge Recommendations:  Further Occupational Therapy is recommended upon facility discharge.    OT Equipment Recommendations  Other: TBD    Restrictions/Precautions  Restrictions/Precautions  Restrictions/Precautions: Up as Tolerated;Fall Risk;General Precautions  Required Braces or Orthoses?: No  Implants present? : Metal implants         Subjective  Subjective  Subjective: pt sleeping upon arrival. pt easily roused and agreeable to participate in therapy.  Subjective  Pain: denies pain  Comments: pt spouse in room throughout session    Objective  Orientation  Orientation Level: Oriented to place;Oriented to person;Disoriented to situation;Disoriented to time (pt knows year but not month or day.)  Cognition  Overall Cognitive Status: Exceptions  Arousal/Alertness: Appropriate responses to stimuli  Following Commands: Follows one step commands with repetition  Attention Span: Attends with cues to redirect  Memory: Decreased recall of recent events;Decreased short term memory  Safety Judgement: Decreased awareness of need for assistance;Decreased awareness of need for safety  Problem Solving: Assistance required to implement solutions;Assistance required to generate solutions  Insights: Decreased awareness of deficits  Initiation: Requires cues for some  Sequencing: Requires cues for some    Activities of Daily Living  ADL  Feeding: Setup  Grooming: Setup  UE Bathing: Stand by assistance  LE Bathing: Moderate assistance  UE Dressing: Stand by assistance  LE Dressing: Moderate assistance  Toileting: Moderate assistance  Additional Comments: ADLscores based  on pt spouse report, skilled  observation and clinical 
Occupational Therapy  [facility]   INPATIENT OCCUPATIONAL THERAPY  PROGRESS NOTE  Date: 3/18/2024  Patient Name: Doe Kapadia       Room:   MRN: [id number]    : 1940  (84 y.o.)  Gender: male   Referring Practitioner: Shayne Haney MD  Diagnosis: UTI      Discharge Recommendations:  Further Occupational Therapy is recommended upon facility discharge.    OT Equipment Recommendations  Other: TBD    Restrictions/Precautions  Restrictions/Precautions  Restrictions/Precautions: Up as Tolerated;Fall Risk;General Precautions  Required Braces or Orthoses?: No  Implants present? : Metal implants         Subjective  Subjective  Subjective: \"I'm pretty tired after my shower.\" Pt required moderate encouragement throughout therapy session from writer and spouse to continue.  Subjective  Pain: denies pain  Comments: pt spouse and daughter in room throughout session    Objective  Orientation  Orientation Level: Oriented to place;Oriented to person (pt knows year but not month or day.)  Cognition  Overall Cognitive Status: Exceptions  Arousal/Alertness: Appropriate responses to stimuli  Following Commands: Follows one step commands with repetition  Attention Span: Attends with cues to redirect  Memory: Decreased recall of recent events;Decreased short term memory  Safety Judgement: Decreased awareness of need for assistance;Decreased awareness of need for safety  Problem Solving: Assistance required to implement solutions;Assistance required to generate solutions  Insights: Decreased awareness of deficits  Initiation: Requires cues for some  Sequencing: Requires cues for some    Activities of Daily Living  ADL  Feeding: Setup  Grooming: Setup  UE Bathing: Stand by assistance  LE Bathing: Moderate assistance  UE Dressing: Stand by assistance  LE Dressing: Moderate assistance  Toileting: Moderate assistance  Additional Comments: pt and spouse state that pt showered earlier this 
Patient voided 350 ml. Scanned bladder  and post void residual is 85 ml  
Physical Therapy        Physical Therapy Cancel Note      DATE: 3/17/2024    NAME: Doe Kapadia  MRN: [id number]   : 1940      Patient not seen this date for Physical Therapy due to:    Patient Declined: Cx; patient refused citing fatigue, patient could not be encouraged. Will attempt in PM if time permitting. Continue to follow for PT needs.      Electronically signed by Cami Moseley PTA on 3/17/2024 at 9:47 AM      
Physical Therapy  DATE: 3/18/2024    NAME: Doe Kapadia  MRN: [id number]   : 1940    Patient not seen this date for Physical Therapy due to:      [] Cancel by RN or physician due to:    [] Hemodialysis    [] Critical Lab Value Level     [] Blood transfusion in progress    [] Acute or unstable cardiovascular status   _MAP < 55 or more than >115  _HR < 40 or > 130    [] Acute or unstable pulmonary status   -FiO2 > 60%   _RR < 5 or >40    _O2 sats < 85%    [] Strict Bedrest    [] Off Unit for surgery or procedure    [] Off Unit for testing       [] Pending imaging to R/O fracture    [x] Refusal by Patient; checked on pt @ 1415. Pt just got out of shower and is fatigued at this time. Will continue to follow.      [] Other      [] PT being discontinued at this time. Patient independent. No further needs.     [] PT being discontinued at this time as the patient has been transferred to hospice care. No further needs.    Electronically signed by Minerva Stevenson PTA on 3/18/24 at 2:28 PM EDT   
Physical Therapy  Facility/Department: [facility] MED SURG  Physical Therapy Initial Assessment    Name: Doe Kapadia  : 1940  MRN: [id number]  Date of Service: 3/16/2024    Discharge Recommendations:  Therapy recommended at discharge          Patient Diagnosis(es): The primary encounter diagnosis was Other fatigue. A diagnosis of Acute cystitis without hematuria was also pertinent to this visit.  Past Medical History:  has a past medical history of Arthritis, CAD (coronary artery disease), CHF (congestive heart failure) (HCC), Diabetes mellitus (HCC), History of blood transfusion, Hyperlipidemia, Hypertension, PVC (premature ventricular contraction), SOB (shortness of breath), and Thyroid disease.  Past Surgical History:  has a past surgical history that includes Colonoscopy (10/5/2011); Colonoscopy (3/7/2006); Colonoscopy (2001); Cardiac surgery; joint replacement; eye surgery; and Coronary angioplasty with stent (2022).    Assessment   Assessment: pt presents this date with decreased strength in B LEs and core and is with decreased balance in standing. Pt also with impaired cognition and requires frequent cues and encouragement to initiate tasks. Pt requires Min A for STS and transfer to chair this date c use of WW due to weakness and was only able to tolerate ambulating 3 ft this date due to decreased endurance and weakness. Pt was able to ambulate indep prior to admission.  REcommend cont therapy in acute setting to address deficits with therapy recommended upon DC as well.  Therapy Prognosis: Fair  Decision Making: Medium Complexity  Requires PT Follow-Up: Yes  Activity Tolerance  Activity Tolerance: Patient tolerated evaluation without incident;Patient limited by fatigue;Patient limited by endurance     Plan   Physical Therapy Plan  General Plan:  (4-6x/wk)  Current Treatment Recommendations: Strengthening, ROM, Balance training, Functional mobility training, Transfer training, Endurance 
SLP ALL NOTES  Speech Language Pathology  [facility]    Dysphagia Treatment Note    Date: 3/18/2024  Patient’s Name: Doe Kapadia  MRN: [id number]  Diagnosis:   Patient Active Problem List   Diagnosis Code    Diverticulosis of colon K57.30    Essential hypertension I10    Type 2 diabetes mellitus ([facility]) E11.9    Hyperlipidemia E78.5    Mixed hyperlipidemia E78.2    Type 2 diabetes mellitus without complication, with long-term current use of insulin ([facility]) E11.9, Z79.4    Coronary artery disease involving native coronary artery of native heart without angina pectoris I25.10    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    Overweight (BMI 25.0-29.9) E66.3    Status post cardiac catheterization Z98.890    Autoimmune thyroiditis E06.3    Disorder associated with type 2 diabetes mellitus ([facility]) E11.8    Hesitancy of micturition R39.11    PVC (premature ventricular contraction) I49.3    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 Seen for diet tolerance monitoring.  No overt s/s aspiration demo. When pt. Taking sips of thin liquid via straw x4.   Pt. Wife present, reports no coughing noted when pt. Eating lunch today.  Pt. Wife reporting cough noted when pt. Sleeping as he is a mouth breather when sleeping.   Will continue diet tolerance monitoring when pt. Eating lunch.       Plan:  [x] Continue ST services    [] Discharge from ST:        Discharge recommendations: []  
SLP ALL NOTES  Speech Language Pathology  [facility]    Dysphagia Treatment Note    Date: 3/19/2024  Patient’s Name: Doe Kapadia  MRN: [id number]  Diagnosis:   Patient Active Problem List   Diagnosis Code    Diverticulosis of colon K57.30    Essential hypertension I10    Type 2 diabetes mellitus ([facility]) E11.9    Hyperlipidemia E78.5    Mixed hyperlipidemia E78.2    Type 2 diabetes mellitus without complication, with long-term current use of insulin ([facility]) E11.9, Z79.4    Coronary artery disease involving native coronary artery of native heart without angina pectoris I25.10    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    Overweight (BMI 25.0-29.9) E66.3    Status post cardiac catheterization Z98.890    Autoimmune thyroiditis E06.3    Disorder associated with type 2 diabetes mellitus ([facility]) E11.8    Hesitancy of micturition R39.11    PVC (premature ventricular contraction) I49.3    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    SOB (shortness of breath) R06.02    Ventricular premature depolarization I49.3    Vitamin deficiency E56.9    Other forms of angina pectoris I20.89    Unstable angina ([facility]) I20.0    Chronic renal disease, stage III ([facility]) [886159] N18.30    UTI (urinary tract infection) N39.0    JANIYA (acute kidney injury) ([facility]) N17.9    Weakness generalized R53.1       Pain: 0/10    Dysphagia Treatment  Treatment time: 7145-1424    Subjective: [x] Alert [x] Cooperative     [] Confused     [] Agitated    [] Lethargic    Objective/Assessment:    Pt. Seen for diet tolerance monitoring while eating lunch his wife brought in.  Pt. Sitting in bed, upright, taking small bites/sips at a slow rate.   No overt s/s aspiration demo. During meal.   Further ST not indicated at this time.     Plan:  [] Continue ST services    [x] Discharge from ST:        Discharge recommendations: []  Further therapy recommended at discharge.The patient should be able to tolerate at least 3 hours of therapy per day 
SPEECH LANGUAGE PATHOLOGY  Facility/Department: [facility] MED SURG   CLINICAL BEDSIDE SWALLOW EVALUATION    NAME: Doe Kapadia  : 1940  MRN: [id number]    ADMISSION DATE: 3/14/2024  ADMITTING DIAGNOSIS: has Diverticulosis of colon; Essential hypertension; Type 2 diabetes mellitus (HCC); Hyperlipidemia; Mixed hyperlipidemia; Type 2 diabetes mellitus without complication, with long-term current use of insulin (HCC); Coronary artery disease involving native coronary artery of native heart without angina pectoris; Primary osteoarthritis involving multiple joints; Overweight (BMI 25.0-29.9); Status post cardiac catheterization; Autoimmune thyroiditis; Disorder associated with type 2 diabetes mellitus (HCC); Hesitancy of micturition; PVC (premature ventricular contraction); Presence of aortocoronary bypass graft; SOB (shortness of breath); Ventricular premature depolarization; Vitamin deficiency; Other forms of angina pectoris; Unstable angina (HCC); Chronic renal disease, stage III ([facility]) [802035]; UTI (urinary tract infection); JANIYA (acute kidney injury) ([facility]); and Weakness generalized on their problem list.    Recent Chest Xray: 2024  IMPRESSION:  No acute pulmonary findings.    Date of Eval: 3/16/2024  Evaluating Therapist: CARMEN Marie    Current Diet level:  Current Diet : Regular  Current Liquid Diet : Thin    Primary Complaint  The patient is a 84 y.o.  male who presented with fatigue and weakness of several day duration. Pt states symptoms have worsened over the past day and he is unsure why he feels so tired. Hx is significant for CAD, HTN, T2DM. In the ER urinalysis positive for possible UTI, pt started on rocephin. Pt was seen and examined at bedside this morning, states he is not sure why he started feeling so tired, family does report pt has a cough. CXR clear but procal elevated, bp was lower end of normal. Pt also noted to have JANIYA. Hx of HFpEF grade 1. The patient denied any CP, SOB, fever, 
Transfer patient to [facility] rm 1932. Called and gave report to Natali.   
Writer attempted to reach out to [facility] for clarification on what needed to be faxed.  There was no answer.  MAR faxed as the one I packet was not sufficient.   
Assistance: Supervision  Interventions: Verbal cues;Safety awareness training  Supine to Sit: Supervision  Sit to Supine: Supervision  Scooting: Supervision  Balance  Sitting: Intact  Standing: With support (SBA/CGA with standing at RW. 1 LOB on initial steps requiring minAx2 to correct)  Transfer Training  Transfer Training: Yes  Overall Level of Assistance: Stand-by assistance  Interventions: Verbal cues;Safety awareness training  Sit to Stand: Stand-by assistance  Stand to Sit: Stand-by assistance  Gait Training  Gait Training: Yes  Gait  Gait Training: Yes  Overall Level of Assistance: Contact-guard assistance;Assist X2 (1 LOB on taking initial steps, minAx2 to correct)  Distance (ft): 85 Feet  Assistive Device: Gait belt;Walker, rolling  Interventions: Verbal cues;Safety awareness training  Base of Support: Narrowed  Speed/Noemi: Slow  Step Length: Left shortened;Right shortened  Stance: Right increased;Left increased  Gait Abnormalities: Decreased step clearance     PT Exercises  Exercise Treatment: Performed functional mobility as noted above with bed mobility transfers and ambulating. Pt fatigued upon return from ambulating and requesting to lay back in bed and deferred further treatment     Safety Devices  Type of Devices: All fall risk precautions in place;Bed alarm in place;Call light within reach;Gait belt;Patient at risk for falls;Left in bed  Restraints  Restraints Initially in Place: No       Goals  Short Term Goals  Time Frame for Short Term Goals: 7 visits  Short Term Goal 1: pt to perform bed mobility with mod indep  Short Term Goal 2: pt to transfer bed to chair c SBA c WW  Short Term Goal 3: pt to ambulate 75ft c WW c CGA  Short Term Goal 4: pt to ascend 2 stairs c railing with Min A  Short Term Goal 5: pt to be indep with LE HEP  Patient Goals   Patient Goals : none stated    Education  Patient Education  Education Given To: Patient  Education Provided: Role of Therapy;Plan of Care;Home 
MD AZEEM   81 mg at 03/17/24 0906    sodium chloride flush 0.9 % injection 5-40 mL  5-40 mL IntraVENous 2 times per day Shayne Haney MD   10 mL at 03/15/24 0847    sodium chloride flush 0.9 % injection 5-40 mL  5-40 mL IntraVENous PRN Shayne Haney MD        0.9 % sodium chloride infusion   IntraVENous PRN Shayne Haney MD        potassium chloride (KLOR-CON M) extended release tablet 40 mEq  40 mEq Oral PRN Shayne Haney MD        Or    potassium bicarb-citric acid (EFFER-K) effervescent tablet 40 mEq  40 mEq Oral PRShayne Smith MD        Or    potassium chloride 10 mEq/100 mL IVPB (Peripheral Line)  10 mEq IntraVENous PRN Shayne Haney MD        magnesium sulfate 2000 mg in water 50 mL IVPB  2,000 mg IntraVENous PRN Shayne Haney MD        enoxaparin (LOVENOX) injection 40 mg  40 mg SubCUTAneous Daily Shayne Haney MD   40 mg at 03/17/24 0905    polyethylene glycol (GLYCOLAX) packet 17 g  17 g Oral Daily PRN Shayne Haney MD        acetaminophen (TYLENOL) tablet 650 mg  650 mg Oral Q6H PRShayne Smith MD        Or    acetaminophen (TYLENOL) suppository 650 mg  650 mg Rectal Q6H PRShayne Smith MD        glucose chewable tablet 16 g  4 tablet Oral PRN Shayne Haney MD        dextrose bolus 10% 125 mL  125 mL IntraVENous PRN Shayne Haney MD        Or    dextrose bolus 10% 250 mL  250 mL IntraVENous PRN Shayne Haney MD        glucagon injection 1 mg  1 mg SubCUTAneous PRShayne Smith MD        dextrose 10 % infusion   IntraVENous Continuous PRShayne Smith MD        rosuvastatin (CRESTOR) tablet 40 mg  40 mg Oral Daily Shayne Haney MD   40 mg at 03/17/24 0906    ticagrelor (BRILINTA) tablet 90 mg  90 mg Oral BID Shayne Haney MD   90 mg at 03/17/24 2058    metoprolol succinate (TOPROL XL) extended release tablet 25 mg  25 mg Oral Daily Shayne Haney MD   25 mg at 03/17/24 0906    losartan (COZAAR) tablet 50 mg  50 mg Oral Daily 
  Bed mobility  Supine to Sit: Stand by assistance  Sit to Supine: Stand by assistance  Scooting: Stand by assistance  Bed Mobility Comments: HOB flat, G technique noted slow pace noted  Transfers  Sit to Stand: Moderate Assistance (from couch surface and shower chair)  Stand to Sit: Minimal Assistance  Bed to Chair: Minimal assistance  Comment: STS for standard height and lower height of couch. patient with need for second attempt with sit to stand with initial loss of balance posterior on heels. Intervention to correct stability and remain upright on feet with second attempt. continued training to bathroom and use brush on hair. Patient with dizziness reported and required need to sit to prevent fall in bathroom. Utilized shower seat by sink to manage needs. Patient with 50-60 HR manually taken during symptoms. patient required 3 minute recovery to deminish dizziness symptoms. patient tolerated 30-40 seconds brushing hair while sitting with reported fatigue. patient continued to sit supported before standing to return to bed. patient with noted exertion in increased SOB from sit to stand from couch height and shower chair height. return to bed BP automatic machine 109/57 in supine  Ambulation  Surface: Level tile  Device: No Device  Assistance: Moderate assistance  Gait Deviations: Staggers;Deviated path;Slow Noemi;Decreased step length;Decreased step height  Comments: patients PLOF prior to this admission per spouse is no device. patient indicative need to sit and lay down after 25 feet of ambulation. Patient agreeable to remain seated and attempt a second walk 40 feet. patient noted to increase staggard gait and reporting need to sit. patient tolerated 25 feet. patient with seated rest break 6 minutes and continue gait to bathroom in room approx 15 feet. Continued c/o of dizziness reported. patient returned to bed.  Stairs/Curb  Stairs?: No                                                          AM-PAC - 
  Sensation  Overall Sensation Status: WFL    ADL  Feeding: Setup  Grooming: Setup  UE Bathing: Stand by assistance  LE Bathing: Moderate assistance  UE Dressing: Stand by assistance  LE Dressing: Moderate assistance  Toileting: Moderate assistance  Toileting Skilled Clinical Factors: Voided seated on toilet. Assist for managing brief over hips  Additional Comments: ADLscores based  on skilled  observation and clinical reasoning, unless otherwise noted. Pt is limited due to lack of safety awareness, impaired  balance, and low activity  tolerance affecting his ability to complete self care tasks         UE Function  LUE AROM (degrees)  LUE AROM : WFL  Left Hand AROM (degrees)  Left Hand AROM: WFL  Tone LUE  LUE Tone: Normotonic  LUE Strength  L Hand General: 4/5  LUE Strength Comment: Grossly 4/5    RUE AROM (degrees)  RUE AROM : WFL  Right Hand AROM (degrees)  Right Hand AROM: WFL  Tone RUE  RUE Tone: Normotonic  RUE Strength  R Hand General: 4/5  RUE Strength Comment: Grossly 4/5         Fine Motor Skills/Coordination  Coordination  Movements Are Fluid And Coordinated: No  Coordination and Movement Description: Decreased accuracy, Right UE, Left UE              Bed Mobility  Bed mobility  Supine to Sit: Contact guard assistance  Sit to Supine: Stand by assistance  Scooting: Stand by assistance  Bed Mobility Comments: HOB slightly elevated, increased time to sit EOB.    Balance  Balance  Sitting Balance: Stand by assistance  Standing Balance: Contact guard assistance  Standing Balance  Time: 1-2 minutes x 3  Activity: self care,  functional  transfers/mobility  Comment: BUE support on RW    Transfers  Transfers  Sit to stand: Minimal assistance  Stand to sit: Minimal assistance (Assist to control descend onto bed  and chair)  Transfer Comments: Cued for hand placement for safety  Toilet Transfers  Toilet - Technique: Ambulating  Equipment Used: Grab bars  Toilet Transfer: Minimal assistance    Functional 
Daily Shayne Haney MD   50 mg at 03/16/24 0935    isosorbide mononitrate (IMDUR) extended release tablet 30 mg  30 mg Oral Daily Shayne Haney MD   30 mg at 03/16/24 0935    levothyroxine (SYNTHROID) tablet 88 mcg  88 mcg Oral Daily Shayne Haney MD   88 mcg at 03/16/24 0935    insulin lispro (HUMALOG) injection vial 0-16 Units  0-16 Units SubCUTAneous TID WC Shayne Haney MD   4 Units at 03/16/24 1813    insulin lispro (HUMALOG) injection vial 0-4 Units  0-4 Units SubCUTAneous Nightly Shayne Haney MD        guaiFENesin (MUCINEX) extended release tablet 600 mg  600 mg Oral Once Evaristo Barros,         insulin glargine (LANTUS) injection vial 20 Units  20 Units SubCUTAneous Daily Shayne Haney MD   20 Units at 03/16/24 1128    benzonatate (TESSALON) capsule 200 mg  200 mg Oral TID PRN Shayne Haney MD   200 mg at 03/16/24 2126    promethazine (PHENERGAN) 6.25 MG/5ML syrup 6.25 mg  6.25 mg Oral Q6H PRN Shayne Haney MD        cefTRIAXone (ROCEPHIN) 1,000 mg in sodium chloride 0.9 % 50 mL IVPB (mini-bag)  1,000 mg IntraVENous Q24H Shayne Haney MD   Stopped at 03/16/24 2323       Intake/Output Summary (Last 24 hours) at 3/17/2024 0728  Last data filed at 3/17/2024 0540  Gross per 24 hour   Intake --   Output 1000 ml   Net -1000 ml       Recent Results (from the past 24 hour(s))   POC Glucose Fingerstick    Collection Time: 03/16/24 11:32 AM   Result Value Ref Range    POC Glucose 191 (H) 75 - 110 mg/dL   POC Glucose Fingerstick    Collection Time: 03/16/24  3:52 PM   Result Value Ref Range    POC Glucose 247 (H) 75 - 110 mg/dL   POC Glucose Fingerstick    Collection Time: 03/16/24  8:19 PM   Result Value Ref Range    POC Glucose 239 (H) 75 - 110 mg/dL   POC Glucose Fingerstick    Collection Time: 03/17/24  6:16 AM   Result Value Ref Range    POC Glucose 175 (H) 75 - 110 mg/dL   Basic Metabolic Panel w/ Reflex to MG    Collection Time: 03/17/24  6:41 AM   Result Value Ref Range    
15  AM-PAC Inpatient ADL T-Scale Score : 34.69  ADL Inpatient CMS 0-100% Score: 56.46  ADL Inpatient CMS G-Code Modifier : CK    OT Minutes  OT Individual Minutes  Time In: 1102  Time Out: 1114  Minutes: 12      Electronically signed by JOAO Chambers on 3/19/24 at 12:02 PM EDT  
Chronic renal disease, stage III ([facility]) [939208]     UTI (urinary tract infection)     JANIYA (acute kidney injury) ([facility])     Weakness generalized           Plan:  -UTI with JANIYA - renal function improved, last dose of Rocephin is today, urine cx no growth thus far.  -Persistent cough - pt states it has improved, speech therapy ongoing.  -HTN - stable.  -DM2 - stable.  -D/C planning is to SNF - awaiting approval.  -Continue current treatments.    Time spent in pre-visit planning, interview, exam, /education and coordination of care: 37 minutes.      See orders   Disposition:    Electronically signed by Zulma Hill MD on 3/19/2024 at 6:15 AM

## 2024-03-21 NOTE — PLAN OF CARE
Problem: Discharge Planning  Goal: Discharge to home or other facility with appropriate resources  Outcome: Progressing     Problem: ABCDS Injury Assessment  Goal: Absence of physical injury  Outcome: Progressing     Problem: Safety - Adult  Goal: Free from fall injury  Outcome: Progressing     Problem: Skin/Tissue Integrity  Goal: Absence of new skin breakdown  Description: 1.  Monitor for areas of redness and/or skin breakdown  2.  Assess vascular access sites hourly  3.  Every 4-6 hours minimum:  Change oxygen saturation probe site  4.  Every 4-6 hours:  If on nasal continuous positive airway pressure, respiratory therapy assess nares and determine need for appliance change or resting period.  Outcome: Progressing     Problem: Chronic Conditions and Co-morbidities  Goal: Patient's chronic conditions and co-morbidity symptoms are monitored and maintained or improved  Outcome: Progressing

## 2024-03-21 NOTE — PLAN OF CARE
Problem: Discharge Planning  Goal: Discharge to home or other facility with appropriate resources  3/21/2024 1125 by Sangeetha Vázquez RN  Outcome: Adequate for Discharge     Problem: ABCDS Injury Assessment  Goal: Absence of physical injury  3/21/2024 1125 by Sangeetha Vázquez RN  Outcome: Adequate for Discharge     Problem: Safety - Adult  Goal: Free from fall injury  3/21/2024 1125 by Sangeetha Vázquez RN  Outcome: Adequate for Discharge     Problem: Skin/Tissue Integrity  Goal: Absence of new skin breakdown  3/21/2024 1125 by Sangeetha Vázquez RN  Outcome: Adequate for Discharge     Problem: Chronic Conditions and Co-morbidities  Goal: Patient's chronic conditions and co-morbidity symptoms are monitored and maintained or improved  3/21/2024 1125 by Sangeetha Vázquez RN  Outcome: Adequate for Discharge

## 2024-03-21 NOTE — CARE COORDINATION
Authorization submitted for this pt through [employer].  Auth ID: [id number]   Electronically signed by SHARLENE Garzon on 3/19/2024 at 3:43 PM    
DISCHARGE PLANNING NOTE:    HENS submitted.    Electronically signed by Sia Bruno RN on 3/21/2024 at 12:36 PM  
DISCHARGE PLANNING NOTE:    This writer spoke to the patient's wife at bedside. This writer explained that in the event the patient gets denied for SNF we would like to send referrals to visiting nurse service. At this time patient's spouse gave choice for [facility], referral sent and declined. Per [facility] they are at their limit with this insurance provider.    Will need more choices from family.     Electronically signed by Sia Bruno RN on 3/20/2024 at 12:17 PM    
DISCHARGE PLANNING NOTE:    This writer uploaded updated PT note where it shows that the patient had difficulty with unsteady gait and dizziness to [facility] to assist with authorization decision.    At this time authorization is still pending. LSW updated.    Electronically signed by Sia Bruno RN on 3/20/2024 at 3:54 PM    
IMM letter provided to patient spouse Corine.  Patient representative  offered four hours to make informed decision regarding appeal process; patient representative agreeable to discharge.     Electronically signed by SHARLENE Garzon on 3/21/2024 at 11:52 AM    
IMM letter provided to patient spouse Corine.  Patient representative offered four hours to make informed decision regarding appeal process; patient representative agreeable to discharge.     Electronically signed by SHARLENE Garzon on 3/18/2024 at 10:49 AM    
ONGOING DISCHARGE PLAN:    Patient is alert and oriented x4.    Spoke with patient & Patient's Wife, Corine, ath the bedside,regarding discharge plan and they confirms that plan is still to SNF.    Corine states \"I want what is best for him\".     Corine has Foc Lists for VNS/SNF, Enc. Her to get 3 choices & we will follow.    PT/OT Rec 24/7 assist/SNF.     Remains on IV Rocephin.     Will continue to follow for additional discharge needs.    If patient is discharged prior to next notation, then this note serves as note for discharge by case management.    Electronically signed by Jaci Juárez RN on 3/17/2024 at 12:08 PM   
ONGOING DISCHARGE PLAN:    Patient is alert and oriented x4.    Spoke with patient regarding discharge plan and patient confirms that plan is still to return to home w/ Spouse.     Denies VNS/SNF at this time.     Awaiting PT/OT Rec & will follow for any needs.     Remains on IV Rocephin.     Will continue to follow for additional discharge needs.    If patient is discharged prior to next notation, then this note serves as note for discharge by case management.    Electronically signed by Jaci Juárez RN on 3/16/2024 at 10:14 AM   
Transportation arranged for patient to go to [facility] at [address] via [facility]. Facility informed. Bedside nurse informed.     Number for Report: [phone] [facility].  Electronically signed by SHARLENE Garzon on 3/21/2024 at 1:28 PM      
Writer is following for potential discharge placement.    Writer spoke to Sarita with [facility]. Facility is evaluating bed availability during morning meeting.    Writer placed call to [facility] admissions to inquire about referral. No answer, voicemail left for return call.    Writer placed call to [facility] regarding referral. Currently in morning meeting, voicemail left for return call.  Electronically signed by SHARLENE Garzon on 3/19/2024 at 9:13 AM    Writer spoke to Sarita with [facility]. Facility can accept this pt into a semi-private room and private room will be available on Thursday 3/21. Writer met with pt and spouse Corine regarding this, agreeable.    Writer placed message to [facility] to confirm this.  Electronically signed by SHARLENE Garzon on 3/19/2024 at 12:44 PM    
Writer is following for potential discharge placement.  Writer met with pt and spouse Corine in room regarding facility of choice.  1) [facility] 2) [facility] 3) [facility] 4) [facility] 5) [facility]    Referrals sent.  Electronically signed by SHARLENE Garzon on 3/18/2024 at 10:46 AM    Writer received call from Nyla with [facility]. No beds available.  Writer received call from Blossom with [facility]. Pt will have out of network costs of $223 a day starting day one.  Electronically signed by SHARLENE Garzon on 3/18/2024 at 11:08 AM    Writer received message from Sarita with [facility]. Facility is reviewing referral for bed availability.    Writer checked status of referral faxed to [facility]. Fax states failed, referral re-faxed. Writer placed call to [facility] admissions. No answer, voicemail left for return call.     Writer placed call to [facility] regarding referral. Facility is reviewing this referral and does not have Epic access. Writer faxed referral to [phone].  Electronically signed by SHARLENE Garzon on 3/18/2024 at 4:17 PM    
Writer is following for potential discharge to [facility].  Writer spoke with Kimi with [facility] regarding authorization and attached requested additional clinicals to authorization.    Writer met with pt spouse Corine for update.  Electronically signed by SHARLENE Garzon on 3/20/2024 at 11:21 AM    
Writer is following for potential discharge to [facility].  Writer checked status in [facility].  Authorization is still pending at this time.  Electronically signed by SHARLENE Garzon on 3/21/2024 at 9:18 AM    Authorization has been approved for this pt. 3/21-3/25.  Writer placed call to [facility] to confirm pt is ready to admit. Pt can admit when ready.   Writer placed call to [facility] to schedule transportation. Forms faxed for scheduling.  Electronically signed by SHARLENE Garzon on 3/21/2024 at 11:19 AM    
spouse; Croine)  Plans to Return to Present Housing: Yes  Other Identified Issues/Barriers to RETURNING to current housing: Fatigue.  Potential Assistance needed at discharge: N/A            Potential DME:    Patient expects to discharge to: Skilled nursing facility  Plan for transportation at discharge: Family    Financial    Payor: [facility] MEDICARE / Plan: [facility] MEDICARE ADVANTAGE / Product Type: *No Product type* /     Does insurance require precert for SNF: Yes    Potential assistance Purchasing Medications: No  Meds-to-Beds request:        [id number] - [place], [place] - [address] - P [phone] - F [phone]  [address] [place] [address]  Phone: [phone] Fax: [phone]    [id number] - [place], [place] - [address] - P [phone] - F [phone]  [address] [place] [address]  Phone: [phone] Fax: [phone]    [id number] - [place], [place] - [address] - P [phone] - F [phone]  [address] [place] [address]  Phone: [phone] Fax: [phone]    [facility] - [place], [place] - [address] - P [phone] - F [phone]  [address]  [facility] [address]  Phone: [phone] Fax: [phone]      Notes:    Factors facilitating achievement of predicted outcomes: Family support, Caregiver support, Cooperative, and Pleasant    Barriers to discharge: Lower extremity weakness    Additional Case Management Notes: The patient is from a 1 story home with spouse, independent, wife transports.    DME: Walker, cane, GB, W/C, SC, glucometer.    VNS: None currently.    This writer spoke with the patient and his daughters, at bedside, regarding the patient's admission. At this time they state that he has been weak and they are interested in possible visiting nurse services. This writer explained that the patient is currently on IV rocephin for a urinary tract infection and PT/OT has 
Assisted  Feeding  Assisted  Med Admin  Assisted  Med Delivery   whole    Wound Care Documentation and Therapy:        Elimination:  Continence:   Bowel: Yes  Bladder: No  Urinary Catheter: None   Colostomy/Ileostomy/Ileal Conduit: No       Date of Last BM: [phone]    Intake/Output Summary (Last 24 hours) at 3/18/2024 0931  Last data filed at 3/17/2024 2216  Gross per 24 hour   Intake --   Output 700 ml   Net -700 ml     I/O last 3 completed shifts:  In: -   Out: 1500 [Urine:1500]    Safety Concerns:     At Risk for Falls    Impairments/Disabilities:      memory    Nutrition Therapy:  Current Nutrition Therapy:   - Oral Diet:  General    Routes of Feeding: Oral  Liquids: No Restrictions  Daily Fluid Restriction: no  Last Modified Barium Swallow with Video (Video Swallowing Test): not done    Treatments at the Time of Hospital Discharge:   Respiratory Treatments: no  Oxygen Therapy:  is not on home oxygen therapy.  Ventilator:    - No ventilator support    Rehab Therapies: Physical Therapy and Occupational Therapy  Weight Bearing Status/Restrictions: No weight bearing restrictions  Other Medical Equipment (for information only, NOT a DME order):  walker  Other Treatments: Skilled Nursing assessment and monitoring. Medication education and monitoring per protocol.      Patient's personal belongings (please select all that are sent with patient):  Dentures upper & lower    RN SIGNATURE:  Electronically signed by Sangeetha Vázquez RN on 3/21/24 at 11:25 AM EDT    CASE MANAGEMENT/SOCIAL WORK SECTION    Inpatient Status Date: 3/15/24    Readmission Risk Assessment Score:  Readmission Risk              Risk of Unplanned Readmission:  16           Discharging to Facility/ Agency   Name:   Address:  Phone:  Fax:    Dialysis Facility (if applicable)   Name:  Address:  Dialysis Schedule:  Phone:  Fax:    / signature: Electronically signed by Hannah Gloria RN on 3/18/24 at 9:32 AM EDT    PHYSICIAN

## 2024-04-14 PROBLEM — N39.0 UTI (URINARY TRACT INFECTION): Status: RESOLVED | Noted: 2024-03-15 | Resolved: 2024-04-14

## 2024-05-03 PROBLEM — I25.118 ATHEROSCLEROTIC HEART DISEASE OF NATIVE CORONARY ARTERY WITH OTHER FORMS OF ANGINA PECTORIS (HCC): Status: ACTIVE | Noted: 2024-05-03

## 2024-10-16 ENCOUNTER — TELEPHONE (OUTPATIENT)
Dept: PHARMACY | Facility: CLINIC | Age: 84
End: 2024-10-16

## 2024-10-16 NOTE — TELEPHONE ENCOUNTER
[facility] CLINICAL PHARMACY: ADHERENCE REVIEW  Identified care gap per [employer] fills at [facility]: Statin adherence    Patient also appears to be prescribed: Statin    ASSESSMENT  STATIN ADHERENCE    Insurance Records claims through 10/07/2024 (Prior Year PDC = not reported; YTD PDC = 79%; Potential Fail Date: 10/19/24):   ROSUVASTATIN TAB 20MG last filled on 24 for 100 day supply. Next refill due: 24    Prescribed si tablet/capsule daily    Per Reconcile Dispense History: last filled on 24 for 100 day supply.     Per [facility]: will get  100 day supply ready to  since past due.    Lab Results   Component Value Date    CHOL 97 2022    TRIG 115 2022    HDL 53 10/16/2023     Lab Results   Component Value Date    LDL 45 10/16/2023      ALT   Date Value Ref Range Status   2024 25 5 - 41 U/L Final     AST   Date Value Ref Range Status   2024 24 <40 U/L Final     The ASCVD Risk score (Jony DK, et al., 2019) failed to calculate for the following reasons:    The 2019 ASCVD risk score is only valid for ages 40 to 79     PLAN  The following are interventions that have been identified:   Patient OVERDUE refilling ROSUVASTATIN TAB 20MG and active on home medication list.     Reached patient to review. Reached pt wife and she said he's been taking his medication daily but would like to have this medication refilled. I let her know a refill will be [facility]     Recent Visits  Date Type Provider Dept   24 Office Visit Zulma Hill MD px [facility]   24 Office Visit Padmaja Trujillo APRN - CNP px [facility]   23 Office Visit Zulma Hill MD [facility]   Showing recent visits within past 540 days with a meds authorizing provider and meeting all other requirements  Future Appointments  Date Type Provider Dept   25 Appointment Zulma Hill MD [facility]   Showing future appointments

## 2024-12-22 ENCOUNTER — HOSPITAL ENCOUNTER (INPATIENT)
Age: 84
LOS: 3 days | Discharge: HOME | DRG: 409 | End: 2024-12-25
Attending: STUDENT IN AN ORGANIZED HEALTH CARE EDUCATION/TRAINING PROGRAM | Admitting: FAMILY MEDICINE
Payer: MEDICARE

## 2024-12-22 ENCOUNTER — APPOINTMENT (OUTPATIENT)
Dept: CT IMAGING | Age: 84
DRG: 409 | End: 2024-12-22
Payer: MEDICARE

## 2024-12-22 DIAGNOSIS — I25.810 CORONARY ARTERY DISEASE INVOLVING CORONARY BYPASS GRAFT OF NATIVE HEART WITHOUT ANGINA PECTORIS: ICD-10-CM

## 2024-12-22 DIAGNOSIS — K80.50 CHOLEDOCHOLITHIASIS: ICD-10-CM

## 2024-12-22 DIAGNOSIS — I10 ESSENTIAL HYPERTENSION: ICD-10-CM

## 2024-12-22 DIAGNOSIS — R17 JAUNDICE: Primary | ICD-10-CM

## 2024-12-22 DIAGNOSIS — I25.118 ATHEROSCLEROTIC HEART DISEASE OF NATIVE CORONARY ARTERY WITH OTHER FORMS OF ANGINA PECTORIS (HCC): ICD-10-CM

## 2024-12-22 LAB
ALBUMIN SERPL-MCNC: 2.3 G/DL (ref 3.5–5.2)
ALP SERPL-CCNC: 1177 U/L (ref 40–129)
ALT SERPL-CCNC: 327 U/L (ref 10–50)
ANION GAP SERPL CALCULATED.3IONS-SCNC: 13 MMOL/L (ref 9–16)
AST SERPL-CCNC: 346 U/L (ref 10–50)
BACTERIA URNS QL MICRO: ABNORMAL
BASOPHILS # BLD: 0 K/UL (ref 0–0.2)
BASOPHILS NFR BLD: 1 % (ref 0–2)
BILIRUB DIRECT SERPL-MCNC: 8.6 MG/DL (ref 0–0.3)
BILIRUB INDIRECT SERPL-MCNC: 2.9 MG/DL (ref 0–1)
BILIRUB SERPL-MCNC: 11.5 MG/DL (ref 0–1.2)
BILIRUB UR QL STRIP: ABNORMAL
BUN SERPL-MCNC: 22 MG/DL (ref 8–23)
CALCIUM SERPL-MCNC: 8.1 MG/DL (ref 8.6–10.4)
CASTS #/AREA URNS LPF: ABNORMAL /LPF
CHLORIDE SERPL-SCNC: 103 MMOL/L (ref 98–107)
CLARITY UR: CLEAR
CO2 SERPL-SCNC: 15 MMOL/L (ref 20–31)
COLOR UR: ABNORMAL
CREAT SERPL-MCNC: 1.3 MG/DL (ref 0.7–1.2)
EOSINOPHIL # BLD: 0.2 K/UL (ref 0–0.4)
EOSINOPHILS RELATIVE PERCENT: 3 % (ref 0–4)
EPI CELLS #/AREA URNS HPF: ABNORMAL /HPF
ERYTHROCYTE [DISTWIDTH] IN BLOOD BY AUTOMATED COUNT: 13.8 % (ref 11.5–14.9)
GFR, ESTIMATED: 55 ML/MIN/1.73M2
GLUCOSE BLD-MCNC: 210 MG/DL (ref 75–110)
GLUCOSE SERPL-MCNC: 232 MG/DL (ref 74–99)
GLUCOSE UR STRIP-MCNC: NEGATIVE MG/DL
HCT VFR BLD AUTO: 41 % (ref 41–53)
HGB BLD-MCNC: 12.9 G/DL (ref 13.5–17.5)
HGB UR QL STRIP.AUTO: NEGATIVE
INR PPP: 1.1
KETONES UR STRIP-MCNC: ABNORMAL MG/DL
LEUKOCYTE ESTERASE UR QL STRIP: ABNORMAL
LIPASE SERPL-CCNC: 43 U/L (ref 13–60)
LYMPHOCYTES NFR BLD: 1.7 K/UL (ref 1–4.8)
LYMPHOCYTES RELATIVE PERCENT: 27 % (ref 24–44)
MCH RBC QN AUTO: 32.1 PG (ref 26–34)
MCHC RBC AUTO-ENTMCNC: 31.5 G/DL (ref 31–37)
MCV RBC AUTO: 101.8 FL (ref 80–100)
MONOCYTES NFR BLD: 0.3 K/UL (ref 0.1–1.3)
MONOCYTES NFR BLD: 5 % (ref 1–7)
NEUTROPHILS NFR BLD: 64 % (ref 36–66)
NEUTS SEG NFR BLD: 4.1 K/UL (ref 1.3–9.1)
NITRITE UR QL STRIP: NEGATIVE
PARTIAL THROMBOPLASTIN TIME: 31.3 SEC (ref 24–36)
PH UR STRIP: 6 [PH] (ref 5–8)
PLATELET # BLD AUTO: 231 K/UL (ref 150–450)
PMV BLD AUTO: 7.7 FL (ref 6–12)
POTASSIUM SERPL-SCNC: 4.2 MMOL/L (ref 3.7–5.3)
PROT SERPL-MCNC: 5 G/DL (ref 6.6–8.7)
PROT UR STRIP-MCNC: ABNORMAL MG/DL
PROTHROMBIN TIME: 14.8 SEC (ref 11.8–14.6)
RBC # BLD AUTO: 4.03 M/UL (ref 4.5–5.9)
RBC #/AREA URNS HPF: ABNORMAL /HPF
SODIUM SERPL-SCNC: 131 MMOL/L (ref 136–145)
SP GR UR STRIP: 1.03 (ref 1–1.03)
TROPONIN I SERPL HS-MCNC: 23 NG/L (ref 0–22)
UROBILINOGEN UR STRIP-ACNC: NORMAL EU/DL (ref 0–1)
WBC #/AREA URNS HPF: ABNORMAL /HPF
WBC OTHER # BLD: 6.3 K/UL (ref 3.5–11)

## 2024-12-22 PROCEDURE — 74177 CT ABD & PELVIS W/CONTRAST: CPT

## 2024-12-22 PROCEDURE — 2580000003 HC RX 258: Performed by: FAMILY MEDICINE

## 2024-12-22 PROCEDURE — 85610 PROTHROMBIN TIME: CPT

## 2024-12-22 PROCEDURE — 80048 BASIC METABOLIC PNL TOTAL CA: CPT

## 2024-12-22 PROCEDURE — 99285 EMERGENCY DEPT VISIT HI MDM: CPT

## 2024-12-22 PROCEDURE — 2500000003 HC RX 250 WO HCPCS: Performed by: FAMILY MEDICINE

## 2024-12-22 PROCEDURE — 6360000004 HC RX CONTRAST MEDICATION

## 2024-12-22 PROCEDURE — 84484 ASSAY OF TROPONIN QUANT: CPT

## 2024-12-22 PROCEDURE — 85025 COMPLETE CBC W/AUTO DIFF WBC: CPT

## 2024-12-22 PROCEDURE — 2500000003 HC RX 250 WO HCPCS

## 2024-12-22 PROCEDURE — 2580000003 HC RX 258

## 2024-12-22 PROCEDURE — 83690 ASSAY OF LIPASE: CPT

## 2024-12-22 PROCEDURE — 36415 COLL VENOUS BLD VENIPUNCTURE: CPT

## 2024-12-22 PROCEDURE — 82947 ASSAY GLUCOSE BLOOD QUANT: CPT

## 2024-12-22 PROCEDURE — 81001 URINALYSIS AUTO W/SCOPE: CPT

## 2024-12-22 PROCEDURE — 80076 HEPATIC FUNCTION PANEL: CPT

## 2024-12-22 PROCEDURE — 1200000000 HC SEMI PRIVATE

## 2024-12-22 PROCEDURE — 85730 THROMBOPLASTIN TIME PARTIAL: CPT

## 2024-12-22 RX ORDER — FENTANYL CITRATE 0.05 MG/ML
25 INJECTION, SOLUTION INTRAMUSCULAR; INTRAVENOUS
Status: DISCONTINUED | OUTPATIENT
Start: 2024-12-22 | End: 2024-12-25 | Stop reason: HOSPADM

## 2024-12-22 RX ORDER — ACETAMINOPHEN 650 MG/1
650 SUPPOSITORY RECTAL EVERY 6 HOURS PRN
Status: DISCONTINUED | OUTPATIENT
Start: 2024-12-22 | End: 2024-12-25 | Stop reason: HOSPADM

## 2024-12-22 RX ORDER — 0.9 % SODIUM CHLORIDE 0.9 %
100 INTRAVENOUS SOLUTION INTRAVENOUS ONCE
Status: COMPLETED | OUTPATIENT
Start: 2024-12-22 | End: 2024-12-22

## 2024-12-22 RX ORDER — SODIUM CHLORIDE 0.9 % (FLUSH) 0.9 %
10 SYRINGE (ML) INJECTION ONCE
Status: COMPLETED | OUTPATIENT
Start: 2024-12-22 | End: 2024-12-22

## 2024-12-22 RX ORDER — SODIUM CHLORIDE 9 MG/ML
INJECTION, SOLUTION INTRAVENOUS PRN
Status: DISCONTINUED | OUTPATIENT
Start: 2024-12-22 | End: 2024-12-25 | Stop reason: HOSPADM

## 2024-12-22 RX ORDER — ENOXAPARIN SODIUM 100 MG/ML
40 INJECTION SUBCUTANEOUS DAILY
Status: DISCONTINUED | OUTPATIENT
Start: 2024-12-23 | End: 2024-12-25 | Stop reason: HOSPADM

## 2024-12-22 RX ORDER — ONDANSETRON 4 MG/1
4 TABLET, ORALLY DISINTEGRATING ORAL EVERY 8 HOURS PRN
Status: DISCONTINUED | OUTPATIENT
Start: 2024-12-22 | End: 2024-12-25 | Stop reason: HOSPADM

## 2024-12-22 RX ORDER — ACETAMINOPHEN 325 MG/1
650 TABLET ORAL EVERY 6 HOURS PRN
Status: DISCONTINUED | OUTPATIENT
Start: 2024-12-22 | End: 2024-12-25 | Stop reason: HOSPADM

## 2024-12-22 RX ORDER — SODIUM CHLORIDE 9 MG/ML
INJECTION, SOLUTION INTRAVENOUS CONTINUOUS
Status: ACTIVE | OUTPATIENT
Start: 2024-12-22 | End: 2024-12-23

## 2024-12-22 RX ORDER — ONDANSETRON 2 MG/ML
4 INJECTION INTRAMUSCULAR; INTRAVENOUS EVERY 6 HOURS PRN
Status: DISCONTINUED | OUTPATIENT
Start: 2024-12-22 | End: 2024-12-25 | Stop reason: HOSPADM

## 2024-12-22 RX ORDER — SODIUM CHLORIDE 0.9 % (FLUSH) 0.9 %
5-40 SYRINGE (ML) INJECTION PRN
Status: DISCONTINUED | OUTPATIENT
Start: 2024-12-22 | End: 2024-12-25 | Stop reason: HOSPADM

## 2024-12-22 RX ORDER — POTASSIUM CHLORIDE 1500 MG/1
40 TABLET, EXTENDED RELEASE ORAL PRN
Status: DISCONTINUED | OUTPATIENT
Start: 2024-12-22 | End: 2024-12-25 | Stop reason: HOSPADM

## 2024-12-22 RX ORDER — 0.9 % SODIUM CHLORIDE 0.9 %
500 INTRAVENOUS SOLUTION INTRAVENOUS ONCE
Status: COMPLETED | OUTPATIENT
Start: 2024-12-22 | End: 2024-12-22

## 2024-12-22 RX ORDER — POLYETHYLENE GLYCOL 3350 17 G/17G
17 POWDER, FOR SOLUTION ORAL DAILY PRN
Status: DISCONTINUED | OUTPATIENT
Start: 2024-12-22 | End: 2024-12-25 | Stop reason: HOSPADM

## 2024-12-22 RX ORDER — POTASSIUM CHLORIDE 7.45 MG/ML
10 INJECTION INTRAVENOUS PRN
Status: DISCONTINUED | OUTPATIENT
Start: 2024-12-22 | End: 2024-12-25 | Stop reason: HOSPADM

## 2024-12-22 RX ORDER — IOPAMIDOL 755 MG/ML
75 INJECTION, SOLUTION INTRAVASCULAR
Status: COMPLETED | OUTPATIENT
Start: 2024-12-22 | End: 2024-12-22

## 2024-12-22 RX ORDER — SODIUM CHLORIDE 0.9 % (FLUSH) 0.9 %
5-40 SYRINGE (ML) INJECTION EVERY 12 HOURS SCHEDULED
Status: DISCONTINUED | OUTPATIENT
Start: 2024-12-22 | End: 2024-12-25 | Stop reason: HOSPADM

## 2024-12-22 RX ORDER — MAGNESIUM SULFATE HEPTAHYDRATE 40 MG/ML
2000 INJECTION, SOLUTION INTRAVENOUS PRN
Status: DISCONTINUED | OUTPATIENT
Start: 2024-12-22 | End: 2024-12-25 | Stop reason: HOSPADM

## 2024-12-22 RX ADMIN — SODIUM CHLORIDE 100 ML: 9 INJECTION, SOLUTION INTRAVENOUS at 19:27

## 2024-12-22 RX ADMIN — IOPAMIDOL 75 ML: 755 INJECTION, SOLUTION INTRAVENOUS at 19:27

## 2024-12-22 RX ADMIN — SODIUM CHLORIDE: 9 INJECTION, SOLUTION INTRAVENOUS at 23:01

## 2024-12-22 RX ADMIN — SODIUM CHLORIDE, PRESERVATIVE FREE 10 ML: 5 INJECTION INTRAVENOUS at 19:27

## 2024-12-22 RX ADMIN — SODIUM CHLORIDE, PRESERVATIVE FREE 10 ML: 5 INJECTION INTRAVENOUS at 23:00

## 2024-12-22 RX ADMIN — SODIUM CHLORIDE 500 ML: 9 INJECTION, SOLUTION INTRAVENOUS at 17:57

## 2024-12-22 ASSESSMENT — PAIN - FUNCTIONAL ASSESSMENT: PAIN_FUNCTIONAL_ASSESSMENT: 0-10

## 2024-12-22 ASSESSMENT — LIFESTYLE VARIABLES
HOW OFTEN DO YOU HAVE A DRINK CONTAINING ALCOHOL: NEVER
HOW MANY STANDARD DRINKS CONTAINING ALCOHOL DO YOU HAVE ON A TYPICAL DAY: PATIENT DOES NOT DRINK

## 2024-12-22 ASSESSMENT — PAIN SCALES - GENERAL
PAINLEVEL_OUTOF10: 0
PAINLEVEL_OUTOF10: 0

## 2024-12-22 NOTE — ED PROVIDER NOTES
[facility] EMERGENCY DEPARTMENT  Emergency Department Encounter  Emergency Medicine Resident     Pt Name:Doe Kapadia  MRN: [id number]  Birthdate 1940  Date of evaluation: 12/22/24  PCP:  Zulma Hill MD  Note Started: 6:38 PM EST      CHIEF COMPLAINT       Chief Complaint   Patient presents with    Generalized Body Aches    Jaundice    Dizziness       HISTORY OF PRESENT ILLNESS  (Location/Symptom, Timing/Onset, Context/Setting, Quality, Duration, Modifying Factors, Severity.)      Doe Kapadia is a 84 y.o. male with past medical history of CHF, diabetes, HTN, HLD who presents with painless jaundice.  Patient's daughter helps provide history.  She reports that she last saw the patient 2 weeks ago.  Reports that for the last 2 months, patient has been struggling with fatigue and increased generalized weakness.  Daughter reports that over the last 2 weeks since she saw him last, patient has become significantly jaundice.  Patient denies abdominal pain, nausea, vomiting.  He reports normal colored diarrhea.  No fever or chills.  Patient reports that he feels well and has no significant complaints.  He has no history of cancer.    PAST MEDICAL / SURGICAL / SOCIAL / FAMILY HISTORY      has a past medical history of Arthritis, CAD (coronary artery disease), CHF (congestive heart failure) (HCC), Diabetes mellitus (HCC), History of blood transfusion, Hyperlipidemia, Hypertension, PVC (premature ventricular contraction), SOB (shortness of breath), and Thyroid disease.       has a past surgical history that includes Colonoscopy (10/5/2011); Colonoscopy (3/7/2006); Colonoscopy (11/14/2001); Cardiac surgery; joint replacement; eye surgery; and Coronary angioplasty with stent (05/06/2022).      Social History     Socioeconomic History    Marital status:      Spouse name: Not on file    Number of children: Not on file    Years of education: Not on file    Highest education level: Not on file

## 2024-12-23 ENCOUNTER — ANESTHESIA (OUTPATIENT)
Dept: ENDOSCOPY | Age: 84
DRG: 409 | End: 2024-12-23
Payer: MEDICARE

## 2024-12-23 ENCOUNTER — ANESTHESIA EVENT (OUTPATIENT)
Dept: ENDOSCOPY | Age: 84
DRG: 409 | End: 2024-12-23
Payer: MEDICARE

## 2024-12-23 ENCOUNTER — APPOINTMENT (OUTPATIENT)
Dept: GENERAL RADIOLOGY | Age: 84
DRG: 409 | End: 2024-12-23
Payer: MEDICARE

## 2024-12-23 PROBLEM — R17 JAUNDICE: Status: ACTIVE | Noted: 2024-12-23

## 2024-12-23 LAB
ALBUMIN SERPL-MCNC: 2.7 G/DL (ref 3.5–5.2)
ALP SERPL-CCNC: 1407 U/L (ref 40–129)
ALT SERPL-CCNC: 379 U/L (ref 10–50)
ANION GAP SERPL CALCULATED.3IONS-SCNC: 12 MMOL/L (ref 9–16)
AST SERPL-CCNC: 396 U/L (ref 10–50)
BASOPHILS # BLD: 0.1 K/UL (ref 0–0.2)
BASOPHILS NFR BLD: 1 % (ref 0–2)
BILIRUB SERPL-MCNC: 13.9 MG/DL (ref 0–1.2)
BUN SERPL-MCNC: 20 MG/DL (ref 8–23)
CALCIUM SERPL-MCNC: 8.9 MG/DL (ref 8.6–10.4)
CHLORIDE SERPL-SCNC: 98 MMOL/L (ref 98–107)
CO2 SERPL-SCNC: 20 MMOL/L (ref 20–31)
CREAT SERPL-MCNC: 1.4 MG/DL (ref 0.7–1.2)
EOSINOPHIL # BLD: 0.1 K/UL (ref 0–0.4)
EOSINOPHILS RELATIVE PERCENT: 2 % (ref 0–4)
ERYTHROCYTE [DISTWIDTH] IN BLOOD BY AUTOMATED COUNT: 13.6 % (ref 11.5–14.9)
GFR, ESTIMATED: 50 ML/MIN/1.73M2
GLUCOSE BLD-MCNC: 199 MG/DL (ref 75–110)
GLUCOSE BLD-MCNC: 219 MG/DL (ref 75–110)
GLUCOSE BLD-MCNC: 246 MG/DL (ref 75–110)
GLUCOSE SERPL-MCNC: 233 MG/DL (ref 74–99)
HCT VFR BLD AUTO: 37.7 % (ref 41–53)
HGB BLD-MCNC: 11.9 G/DL (ref 13.5–17.5)
INR PPP: 1.2
LYMPHOCYTES NFR BLD: 1 K/UL (ref 1–4.8)
LYMPHOCYTES RELATIVE PERCENT: 14 % (ref 24–44)
MCH RBC QN AUTO: 32.1 PG (ref 26–34)
MCHC RBC AUTO-ENTMCNC: 31.6 G/DL (ref 31–37)
MCV RBC AUTO: 101.5 FL (ref 80–100)
MONOCYTES NFR BLD: 0.4 K/UL (ref 0.1–1.3)
MONOCYTES NFR BLD: 6 % (ref 1–7)
NEUTROPHILS NFR BLD: 77 % (ref 36–66)
NEUTS SEG NFR BLD: 5.3 K/UL (ref 1.3–9.1)
PLATELET # BLD AUTO: 234 K/UL (ref 150–450)
PMV BLD AUTO: 7.9 FL (ref 6–12)
POTASSIUM SERPL-SCNC: 4.3 MMOL/L (ref 3.7–5.3)
PROT SERPL-MCNC: 5.9 G/DL (ref 6.6–8.7)
PROTHROMBIN TIME: 15.7 SEC (ref 11.8–14.6)
RBC # BLD AUTO: 3.72 M/UL (ref 4.5–5.9)
SODIUM SERPL-SCNC: 130 MMOL/L (ref 136–145)
WBC OTHER # BLD: 6.8 K/UL (ref 3.5–11)

## 2024-12-23 PROCEDURE — 0FC48ZZ EXTIRPATION OF MATTER FROM GALLBLADDER, VIA NATURAL OR ARTIFICIAL OPENING ENDOSCOPIC: ICD-10-PCS | Performed by: INTERNAL MEDICINE

## 2024-12-23 PROCEDURE — 2709999900 HC NON-CHARGEABLE SUPPLY: Performed by: INTERNAL MEDICINE

## 2024-12-23 PROCEDURE — 2580000003 HC RX 258: Performed by: FAMILY MEDICINE

## 2024-12-23 PROCEDURE — 43264 ERCP REMOVE DUCT CALCULI: CPT | Performed by: INTERNAL MEDICINE

## 2024-12-23 PROCEDURE — APPNB60 APP NON BILLABLE TIME 46-60 MINS: Performed by: NURSE PRACTITIONER

## 2024-12-23 PROCEDURE — 0F798DZ DILATION OF COMMON BILE DUCT WITH INTRALUMINAL DEVICE, VIA NATURAL OR ARTIFICIAL OPENING ENDOSCOPIC: ICD-10-PCS | Performed by: INTERNAL MEDICINE

## 2024-12-23 PROCEDURE — 2580000003 HC RX 258: Performed by: NURSE ANESTHETIST, CERTIFIED REGISTERED

## 2024-12-23 PROCEDURE — 2580000003 HC RX 258: Performed by: INTERNAL MEDICINE

## 2024-12-23 PROCEDURE — C2625 STENT, NON-COR, TEM W/DEL SY: HCPCS | Performed by: INTERNAL MEDICINE

## 2024-12-23 PROCEDURE — 36415 COLL VENOUS BLD VENIPUNCTURE: CPT

## 2024-12-23 PROCEDURE — C1726 CATH, BAL DIL, NON-VASCULAR: HCPCS | Performed by: INTERNAL MEDICINE

## 2024-12-23 PROCEDURE — 7100000000 HC PACU RECOVERY - FIRST 15 MIN: Performed by: INTERNAL MEDICINE

## 2024-12-23 PROCEDURE — 6360000004 HC RX CONTRAST MEDICATION: Performed by: INTERNAL MEDICINE

## 2024-12-23 PROCEDURE — 3700000001 HC ADD 15 MINUTES (ANESTHESIA): Performed by: INTERNAL MEDICINE

## 2024-12-23 PROCEDURE — 2720000010 HC SURG SUPPLY STERILE: Performed by: INTERNAL MEDICINE

## 2024-12-23 PROCEDURE — 43245 EGD DILATE STRICTURE: CPT | Performed by: INTERNAL MEDICINE

## 2024-12-23 PROCEDURE — BF131ZZ FLUOROSCOPY OF GALLBLADDER AND BILE DUCTS USING LOW OSMOLAR CONTRAST: ICD-10-PCS | Performed by: INTERNAL MEDICINE

## 2024-12-23 PROCEDURE — 2500000003 HC RX 250 WO HCPCS: Performed by: NURSE ANESTHETIST, CERTIFIED REGISTERED

## 2024-12-23 PROCEDURE — 82947 ASSAY GLUCOSE BLOOD QUANT: CPT

## 2024-12-23 PROCEDURE — 3609015200 HC ERCP REMOVE CALCULI/DEBRIS BILIARY/PANCREAS DUCT: Performed by: INTERNAL MEDICINE

## 2024-12-23 PROCEDURE — 3700000000 HC ANESTHESIA ATTENDED CARE: Performed by: INTERNAL MEDICINE

## 2024-12-23 PROCEDURE — 6360000002 HC RX W HCPCS: Performed by: NURSE ANESTHETIST, CERTIFIED REGISTERED

## 2024-12-23 PROCEDURE — 80053 COMPREHEN METABOLIC PANEL: CPT

## 2024-12-23 PROCEDURE — 99223 1ST HOSP IP/OBS HIGH 75: CPT | Performed by: STUDENT IN AN ORGANIZED HEALTH CARE EDUCATION/TRAINING PROGRAM

## 2024-12-23 PROCEDURE — 7100000001 HC PACU RECOVERY - ADDTL 15 MIN: Performed by: INTERNAL MEDICINE

## 2024-12-23 PROCEDURE — C1769 GUIDE WIRE: HCPCS | Performed by: INTERNAL MEDICINE

## 2024-12-23 PROCEDURE — 1200000000 HC SEMI PRIVATE

## 2024-12-23 PROCEDURE — 43274 ERCP DUCT STENT PLACEMENT: CPT | Performed by: INTERNAL MEDICINE

## 2024-12-23 PROCEDURE — 99222 1ST HOSP IP/OBS MODERATE 55: CPT | Performed by: INTERNAL MEDICINE

## 2024-12-23 PROCEDURE — 85025 COMPLETE CBC W/AUTO DIFF WBC: CPT

## 2024-12-23 PROCEDURE — 85610 PROTHROMBIN TIME: CPT

## 2024-12-23 DEVICE — BILIARY STENT WITH NAVIFLEXTM RX DELIVERY SYSTEM
Type: IMPLANTABLE DEVICE | Site: BILE DUCT | Status: FUNCTIONAL
Brand: ADVANIX™ BILIARY

## 2024-12-23 RX ORDER — FENTANYL CITRATE 0.05 MG/ML
25 INJECTION, SOLUTION INTRAMUSCULAR; INTRAVENOUS EVERY 5 MIN PRN
Status: DISCONTINUED | OUTPATIENT
Start: 2024-12-23 | End: 2024-12-23 | Stop reason: HOSPADM

## 2024-12-23 RX ORDER — METOCLOPRAMIDE HYDROCHLORIDE 5 MG/ML
10 INJECTION INTRAMUSCULAR; INTRAVENOUS
Status: DISCONTINUED | OUTPATIENT
Start: 2024-12-23 | End: 2024-12-23 | Stop reason: HOSPADM

## 2024-12-23 RX ORDER — SODIUM CHLORIDE 9 MG/ML
INJECTION, SOLUTION INTRAVENOUS PRN
Status: DISCONTINUED | OUTPATIENT
Start: 2024-12-23 | End: 2024-12-23 | Stop reason: HOSPADM

## 2024-12-23 RX ORDER — PROPOFOL 10 MG/ML
INJECTION, EMULSION INTRAVENOUS
Status: DISCONTINUED | OUTPATIENT
Start: 2024-12-23 | End: 2024-12-23 | Stop reason: SDUPTHER

## 2024-12-23 RX ORDER — DIPHENHYDRAMINE HYDROCHLORIDE 50 MG/ML
12.5 INJECTION INTRAMUSCULAR; INTRAVENOUS
Status: DISCONTINUED | OUTPATIENT
Start: 2024-12-23 | End: 2024-12-23 | Stop reason: HOSPADM

## 2024-12-23 RX ORDER — ONDANSETRON 2 MG/ML
4 INJECTION INTRAMUSCULAR; INTRAVENOUS
Status: DISCONTINUED | OUTPATIENT
Start: 2024-12-23 | End: 2024-12-23 | Stop reason: HOSPADM

## 2024-12-23 RX ORDER — NALOXONE HYDROCHLORIDE 0.4 MG/ML
INJECTION, SOLUTION INTRAMUSCULAR; INTRAVENOUS; SUBCUTANEOUS PRN
Status: DISCONTINUED | OUTPATIENT
Start: 2024-12-23 | End: 2024-12-23 | Stop reason: HOSPADM

## 2024-12-23 RX ORDER — IOPAMIDOL 612 MG/ML
INJECTION, SOLUTION INTRAVASCULAR PRN
Status: DISCONTINUED | OUTPATIENT
Start: 2024-12-23 | End: 2024-12-23 | Stop reason: ALTCHOICE

## 2024-12-23 RX ORDER — ONDANSETRON 2 MG/ML
INJECTION INTRAMUSCULAR; INTRAVENOUS
Status: DISCONTINUED | OUTPATIENT
Start: 2024-12-23 | End: 2024-12-23 | Stop reason: SDUPTHER

## 2024-12-23 RX ORDER — SODIUM CHLORIDE, SODIUM LACTATE, POTASSIUM CHLORIDE, CALCIUM CHLORIDE 600; 310; 30; 20 MG/100ML; MG/100ML; MG/100ML; MG/100ML
INJECTION, SOLUTION INTRAVENOUS CONTINUOUS
Status: ACTIVE | OUTPATIENT
Start: 2024-12-23 | End: 2024-12-24

## 2024-12-23 RX ORDER — INDOMETHACIN 50 MG/1
SUPPOSITORY RECTAL PRN
Status: DISCONTINUED | OUTPATIENT
Start: 2024-12-23 | End: 2024-12-23 | Stop reason: ALTCHOICE

## 2024-12-23 RX ORDER — ROCURONIUM BROMIDE 10 MG/ML
INJECTION, SOLUTION INTRAVENOUS
Status: DISCONTINUED | OUTPATIENT
Start: 2024-12-23 | End: 2024-12-23 | Stop reason: SDUPTHER

## 2024-12-23 RX ORDER — LIDOCAINE HYDROCHLORIDE 20 MG/ML
INJECTION, SOLUTION EPIDURAL; INFILTRATION; INTRACAUDAL; PERINEURAL
Status: DISCONTINUED | OUTPATIENT
Start: 2024-12-23 | End: 2024-12-23 | Stop reason: SDUPTHER

## 2024-12-23 RX ORDER — SODIUM CHLORIDE 9 MG/ML
INJECTION, SOLUTION INTRAVENOUS
Status: DISCONTINUED | OUTPATIENT
Start: 2024-12-23 | End: 2024-12-23 | Stop reason: SDUPTHER

## 2024-12-23 RX ORDER — SODIUM CHLORIDE 0.9 % (FLUSH) 0.9 %
5-40 SYRINGE (ML) INJECTION PRN
Status: DISCONTINUED | OUTPATIENT
Start: 2024-12-23 | End: 2024-12-23 | Stop reason: HOSPADM

## 2024-12-23 RX ORDER — SODIUM CHLORIDE 0.9 % (FLUSH) 0.9 %
5-40 SYRINGE (ML) INJECTION EVERY 12 HOURS SCHEDULED
Status: DISCONTINUED | OUTPATIENT
Start: 2024-12-23 | End: 2024-12-23 | Stop reason: HOSPADM

## 2024-12-23 RX ADMIN — PROPOFOL 150 MG: 10 INJECTION, EMULSION INTRAVENOUS at 15:41

## 2024-12-23 RX ADMIN — SODIUM CHLORIDE: 9 INJECTION, SOLUTION INTRAVENOUS at 15:36

## 2024-12-23 RX ADMIN — ONDANSETRON 4 MG: 2 INJECTION, SOLUTION INTRAMUSCULAR; INTRAVENOUS at 16:54

## 2024-12-23 RX ADMIN — SODIUM CHLORIDE: 9 INJECTION, SOLUTION INTRAVENOUS at 08:17

## 2024-12-23 RX ADMIN — SUGAMMADEX 200 MG: 100 INJECTION, SOLUTION INTRAVENOUS at 16:57

## 2024-12-23 RX ADMIN — SODIUM CHLORIDE, POTASSIUM CHLORIDE, SODIUM LACTATE AND CALCIUM CHLORIDE: 600; 310; 30; 20 INJECTION, SOLUTION INTRAVENOUS at 18:18

## 2024-12-23 RX ADMIN — ROCURONIUM BROMIDE 50 MG: 10 INJECTION, SOLUTION INTRAVENOUS at 15:41

## 2024-12-23 RX ADMIN — LIDOCAINE HYDROCHLORIDE 100 MG: 20 INJECTION, SOLUTION EPIDURAL; INFILTRATION; INTRACAUDAL; PERINEURAL at 15:41

## 2024-12-23 ASSESSMENT — PAIN - FUNCTIONAL ASSESSMENT
PAIN_FUNCTIONAL_ASSESSMENT: NONE - DENIES PAIN
PAIN_FUNCTIONAL_ASSESSMENT: 0-10

## 2024-12-23 ASSESSMENT — ENCOUNTER SYMPTOMS: SHORTNESS OF BREATH: 1

## 2024-12-23 ASSESSMENT — PAIN SCALES - GENERAL: PAINLEVEL_OUTOF10: 0

## 2024-12-23 NOTE — ANESTHESIA POSTPROCEDURE EVALUATION
Department of Anesthesiology  Postprocedure Note    Patient: Doe Kapadia  MRN: [id number]  YOB: 1940  Date of evaluation: 12/23/2024    Procedure Summary       Date: 12/23/24 Room / Location: [place] [address] / [facility]    Anesthesia Start: 1536 Anesthesia Stop: 1706    Procedure: ENDOSCOPIC RETROGRADE CHOLANGIOPANCREATOGRAPHY Diagnosis:       Common bile duct stone      (Common bile duct stone [K80.50])    Surgeons: Santy Gurrola MD Responsible Provider: Chris Dobbs MD    Anesthesia Type: general ASA Status: 3            Anesthesia Type: No value filed.    Chiquita Phase I: Chiquita Score: 8    Chiquita Phase II:      Anesthesia Post Evaluation    Patient location during evaluation: PACU  Patient participation: complete - patient participated  Level of consciousness: awake and alert  Airway patency: patent  Nausea & Vomiting: no vomiting  Cardiovascular status: hemodynamically stable  Respiratory status: acceptable  Hydration status: euvolemic  Comments: POST- ANESTHESIA EVALUATION       Pt Name: Doe Kapadia  MRN: [id number]  YOB: 1940  Date of evaluation: 12/23/2024  Time:  5:16 PM      BP (!) 128/56   Pulse 65   Temp 97.7 °F (36.5 °C) (Infrared)   Resp 17   Ht 1.702 m (5' 7.01\")   Wt 72.6 kg (160 lb)   SpO2 100%   BMI 25.05 kg/m²      Consciousness Level  Awake  Cardiopulmonary Status  Stable  Pain Adequately Treated YES  Nausea / Vomiting  NO  Adequate Hydration  YES  Anesthesia Related Complications NONE      Electronically signed by Chris Dobbs MD on 12/23/2024 at 5:16 PM         Pain management: satisfactory to patient    No notable events documented.

## 2024-12-23 NOTE — OP NOTE
ERCP      Patient:   Doe Kapadia   :    1940  Acc#:    [id number]   Referring/PCP: Zulma Hill MD  Date:     2024   Facility:                       [facility]  Endoscopist: Santy Gurrola M.D.FACP,FACG  Procedure: ERCP with pyloric dilation, cholangiogram  sphincterotomy, Hurricane balloon dilation, removal of stones and CBD stenting        Indication: Choledocholithiasis with pyloric stenosis      pre op diagnosis: see indication  Post op diagnosis: see findings  Complications: none, no blood loss   Estimated blood loss: less than 50 CC       Anesthesia:  General     Description of Procedure:  Prior to the procedure, a history and physical exam was performed and informed consent was obtained. The risks were discussed including pancreatitis, bleeding, and perforation.      After the patient was placed in the prone position eved, the therapeutic duodenoscope scope was inserted into the mouth and advanced to the stomach which was J-shaped and pyloric stenosis was noted. CRE balloon (18/20 mm) was used to dilate the pylorus to 20 mm. Then, the duodenoscope was advanced and after changing the posture of the patient and abdominal pressure, the scope advanced to second portion of the duodenum allowing the papilla to be visualized.     Using the a wire guided approach with the sphincterotome, initial attempts led to inadvertent cannulation of PD. The wire was pulled out and  the CBD cannulation was again attempted which was successful and a cholangiogram was performed.        Findings:  The initial cholangiogram revealed multiple filling defects    A biliary sphincterotomy was performed.    The sphincterotome was exchanged, over a wire for a 9/12 mm above injection balloon.  Inflated balloon failed to come out of papilla. Then we used Hurricane balloon (4 cm x 8 mm) to dilate the ampulla. Extraction balloon (9/12 mm) was then again inserted over the wire. Multiple balloon sweeps were

## 2024-12-23 NOTE — ANESTHESIA PRE PROCEDURE
12/23/2024 06:09 AM    CO2 20 12/23/2024 06:09 AM    BUN 20 12/23/2024 06:09 AM    CREATININE 1.4 12/23/2024 06:09 AM    GFRAA 59 06/15/2022 02:15 AM    LABGLOM 50 12/23/2024 06:09 AM    LABGLOM >60 03/18/2024 07:09 AM    LABGLOM 58 10/16/2023 12:00 AM    GLUCOSE 233 12/23/2024 06:09 AM    GLUCOSE 115 05/29/2012 08:18 AM    CALCIUM 8.9 12/23/2024 06:09 AM    BILITOT 13.9 12/23/2024 06:09 AM    ALKPHOS 1,407 12/23/2024 06:09 AM     12/23/2024 06:09 AM     12/23/2024 06:09 AM       POC Tests:   Recent Labs     12/23/24  1114   POCGLU 246*       Coags:   Lab Results   Component Value Date/Time    PROTIME 15.7 12/23/2024 06:09 AM    INR 1.2 12/23/2024 06:09 AM    APTT 31.3 12/22/2024 05:28 PM       HCG (If Applicable): No results found for: \"PREGTESTUR\", \"PREGSERUM\", \"HCG\", \"HCGQUANT\"     ABGs:   Lab Results   Component Value Date/Time    PO2ART 88.8 02/02/2012 11:20 AM    IBN2ATN 21.5 02/02/2012 11:20 AM    K8ODCDZS 93.5 02/02/2012 11:20 AM        Type & Screen (If Applicable):  Lab Results   Component Value Date    ABORH AB POSITIVE 01/18/2012    LABANTI NEGATIVE 01/18/2012       Drug/Infectious Status (If Applicable):  Lab Results   Component Value Date/Time    HEPCAB NONREACTIVE 11/27/2017 08:41 AM       COVID-19 Screening (If Applicable):   Lab Results   Component Value Date/Time    COVID19 Not Detected 03/14/2024 09:29 PM           Anesthesia Evaluation  Patient summary reviewed and Nursing notes reviewed   no history of anesthetic complications:   Airway: Mallampati: II  TM distance: >3 FB   Neck ROM: full  Mouth opening: > = 3 FB   Dental:    (+) upper dentures and lower dentures      Pulmonary: breath sounds clear to auscultation  (+)   shortness of breath: chronic,                                    Cardiovascular:    (+) hypertension:, angina:, CAD:, CHF:        Rhythm: regular  Rate: normal  Echocardiogram reviewed               ROS comment: Echo: 2019  Normal left ventricle size, wall

## 2024-12-23 NOTE — ACP (ADVANCE CARE PLANNING)
Advance Care Planning     Advance Care Planning Activator (Inpatient)  Conversation Note      Date of ACP Conversation: 12/23/2024     Conversation Conducted with: Patient with Decision Making Capacity    ACP Activator: Sia Louie RN    Health Care Decision Maker:     Current Designated Health Care Decision Maker:     Primary Decision Maker: Corine Kapadia - [facility] - [phone]  Click here to complete Healthcare Decision Makers including section of the Healthcare Decision Maker Relationship (ie \"Primary\")  Today we documented Decision Maker(s) consistent with Legal Next of Kin hierarchy.    Care Preferences    Ventilation:  \"If you were in your present state of health and suddenly became very ill and were unable to breathe on your own, what would your preference be about the use of a ventilator (breathing machine) if it were available to you?\"      Would the patient desire the use of ventilator (breathing machine)?: yes    \"If your health worsens and it becomes clear that your chance of recovery is unlikely, what would your preference be about the use of a ventilator (breathing machine) if it were available to you?\"     Would the patient desire the use of ventilator (breathing machine)?: No      Resuscitation  \"CPR works best to restart the heart when there is a sudden event, like a heart attack, in someone who is otherwise healthy. Unfortunately, CPR does not typically restart the heart for people who have serious health conditions or who are very sick.\"    \"In the event your heart stopped as a result of an underlying serious health condition, would you want attempts to be made to restart your heart (answer \"yes\" for attempt to resuscitate) or would you prefer a natural death (answer \"no\" for do not attempt to resuscitate)?\" yes       [] Yes   [x] No   Educated Patient / Decision Maker regarding differences between Advance Directives and portable DNR orders.    Length of ACP Conversation in minutes:

## 2024-12-23 NOTE — ED NOTES
Report given to SAMM Young from Med Surg.   Report method by phone   The following was reviewed with receiving RN:   Current vital signs:  BP (!) 104/56   Pulse 68   Temp 97.3 °F (36.3 °C) (Tympanic)   Resp 19   Ht 1.702 m (5' 7\")   Wt 72.6 kg (160 lb)   SpO2 98%   BMI 25.06 kg/m²                      Any medication or safety alerts were reviewed. Any pending diagnostics and notifications were also reviewed, as well as any safety concerns or issues, abnormal labs, abnormal imaging, and abnormal assessment findings. Questions were answered.

## 2024-12-23 NOTE — PROGRESS NOTES
Patient admitted to room 2060, family at bedside. Vital signs stable, admission questions and assessment complete; bed alarm activated.

## 2024-12-23 NOTE — ED NOTES
Report given to SAMM posey from ed .   Report method in person   The following was reviewed with receiving RN:   Current vital signs:  BP (!) 101/57   Pulse 64   Temp 97.3 °F (36.3 °C) (Tympanic)   Resp 18   Ht 1.702 m (5' 7\")   Wt 72.6 kg (160 lb)   SpO2 98%   BMI 25.06 kg/m²                      Any medication or safety alerts were reviewed. Any pending diagnostics and notifications were also reviewed, as well as any safety concerns or issues, abnormal labs, abnormal imaging, and abnormal assessment findings. Questions were answered.

## 2024-12-23 NOTE — CONSULTS
Gastroenterology Consult Note    Patient:   Doe Kapadia   Admit date:  12/22/2024  Facility:   [facility]  Referring/PCP: Zulma Hill MD  Date:     12/23/2024  Consultant:   HAYLEE Eric - ELIDIA, Santy Gurrola MD    Subjective:     This 84 y.o. male was admitted 12/22/2024 with a diagnosis of \"Choledocholithiasis [K80.50]  Jaundice [R17]\" and is seen in consultation regarding   Chief Complaint   Patient presents with    Generalized Body Aches    Jaundice    Dizziness     84/M w/ pmhx of CHF, HTN, HLD presents to ED with painless jaundice.  Patient is a poor historian.  Son-in-law at bedside reports that his wife came to see patient and noticed he was significantly jaundiced.  Patient denied any abdominal pain, nausea, vomiting, fevers, chills.  He reports loose stools.  Denies any melena, hematochezia.  He does report some fatigue, generalized weakness of the past several months.  In the ED initial labs showed Na 131, creatinine 1.3, albumin 2.3, ALP 1177, , , Bili 11.5 (direct 8.6), hgb 12.9.  UA showed trace leukocytes  CT abd/pelvis:  1. Choledocholithiasis, with a 7.5 mm stone identified within the distal  common bile duct, at the level of the ampulla.  Moderate degree of  intrahepatic and extrahepatic biliary ductal dilatation is now present. 6.5  mm stone identified within the cystic duct  2. Atrophic right kidney again demonstrated, with calculi again visualized  within the distal right ureter, without evidence of hydroureter.  Minimal  fullness of the right pelvocaliceal systems  is again demonstrated.  3. Diffuse hepatic steatosis  4. Scattered colonic diverticula, without evidence of diverticulitis    Patient is on ASA and brilinta.  States his last dose on 12/21/24    Past Medical History:  Past Medical History:   Diagnosis Date    Arthritis     CAD (coronary artery disease)     CHF (congestive heart failure) (HCC)     Diabetes mellitus (HCC)       --  1.2   NA  --  131* 130*   K  --  4.2 4.3   CL  --  103 98   CO2  --  15* 20   BUN  --  22 20   CREATININE  --  1.3* 1.4*   GLUCOSE  --  232* 233*   CALCIUM  --  8.1* 8.9   AST  --  346* 396*   ALT  --  327* 379*   ALKPHOS  --  1,177* 1,407*   BILITOT  --  11.5* 13.9*   BILIDIR  --  8.6*  --    LIPASE  --  43  --      Assessment:   Principal Problem:    Choledocholithiasis  Resolved Problems:    * No resolved hospital problems. *        Plan:   Painless jaundice, elevated LFTs, with imaging suggestive of choledocholithiasis and intra/extrahepatic biliary ductal dilation  -ERCP today  -Keep NPO  -Hold Lovenox  -IVF    This plan was formulated in collaboration with Dr. Gurrola    Electronically signed by HAYLEE Eric NP on 12/23/2024 at 11:31 AM     Note is dictated utilizing voice recognition software. Unfortunately this leads to occasional typographical errors. Please contact our office if you have any questions.      Attested:    I have discussed the care of Doe Kapadia and I have examined the patient myselft and taken ros and hpi , including pertinent history and exam findings,  with the author of this note . I have reviewed the key elements of all parts of the encounter with the nurse practitioner/resident.  I agree with the assessment, plan and orders as documented by the above health care provider with the following addendum    Impression- Choledocholithiasis with obstructive jaundice      Plan- Will plan ERCP today  Daily LFT's    More than 50% of the time was spent taking care of this patient in addition to the nurse practitioner time.  That also included history taking follow-up physical examination and review of system.    Electronically signed by Santy Gurrola MD

## 2024-12-23 NOTE — PLAN OF CARE
Problem: Chronic Conditions and Co-morbidities  Goal: Patient's chronic conditions and co-morbidity symptoms are monitored and maintained or improved  12/23/2024 1120 by Shruthi Del Castillo RN  Outcome: Progressing     Problem: Discharge Planning  Goal: Discharge to home or other facility with appropriate resources  12/23/2024 1120 by Shruthi Del Castillo RN  Outcome: Progressing     Problem: Safety - Adult  Goal: Free from fall injury  12/23/2024 1120 by Shruthi Del Castillo RN  Outcome: Progressing     Problem: Pain  Goal: Verbalizes/displays adequate comfort level or baseline comfort level  12/23/2024 1120 by Shruthi Del Castillo RN  Outcome: Progressing     Problem: ABCDS Injury Assessment  Goal: Absence of physical injury  12/23/2024 1120 by Shruthi Del Castillo RN  Outcome: Progressing

## 2024-12-23 NOTE — PROGRESS NOTES
Comprehensive Nutrition Assessment    Type and Reason for Visit:  Initial, Positive nutrition screen (Poor appetite, intake, and wt loss)    Nutrition Recommendations/Plan:   NPO.  Will recommend appropriate course of action when nutrition advances.     Malnutrition Assessment:  Malnutrition Status:  At risk for malnutrition (12/23/24 1447)    Context:  Chronic Illness     Findings of the 6 clinical characteristics of malnutrition:  Energy Intake:  Mild decrease in energy intake  Weight Loss:   (Possilby 10% wt loss in 9 months; unable to verify)     Body Fat Loss:  Unable to assess     Muscle Mass Loss:  Unable to assess    Fluid Accumulation:  Unable to assess     Strength:  Not Performed    Nutrition Assessment:    Pt admitted for choledocholithiasis, jaundice, with PMH of DM2, CKD3, CHF, diverticulosis; s/p stent placement on 5/2022. Pt is NPO for ERCP (endoscopic retrograde cholangiopancreatography) this date.    Nutrition Related Findings:    Labs: Na 130, GFR 48, Gluc 233. No edema data available - refer to nursing flowsheet. Meds reviewed. Wound Type: None       Current Nutrition Intake & Therapies:    Average Meal Intake: NPO  Average Supplements Intake: NPO  Diet NPO    Anthropometric Measures:  Height: 170.2 cm (5' 7.01\")  Ideal Body Weight (IBW): 148 lbs (67 kg)    Admission Body Weight: 72.6 kg (160 lb 0.9 oz)  Current Body Weight: 72.6 kg (160 lb 0.9 oz), 108.1 % IBW. Weight Source: Not specified  Current BMI (kg/m2): 25.1  Usual Body Weight: 81.2 kg (179 lb 0.2 oz) (3/14/2024 (~9mo.) Stated)     % Weight Change (Calculated): -10.6  Weight Adjustment For: No Adjustment                 BMI Categories: Overweight (BMI 25.0-29.9)    Estimated Daily Nutrient Needs:  Energy Requirements Based On: Formula  Weight Used for Energy Requirements: Admission  Energy (kcal/day): 5966-7807 kcal/day based on [place]-St. Jeor 1.2-1.3 factor  Weight Used for Protein Requirements: Admission  Protein (g/day): 73-87  g/day based on 1-1.2 g/kg  Method Used for Fluid Requirements: 1 ml/kcal  Fluid (ml/day): or per physician    Nutrition Diagnosis:   Inadequate protein intake related to altered GI function as evidenced by NPO or clear liquid status due to medical condition    Nutrition Interventions:   Food and/or Nutrient Delivery: Continue NPO  Nutrition Education/Counseling: No recommendation at this time  Coordination of Nutrition Care: Continue to monitor while inpatient       Goals:  Goals: Initiation of nutrition  Type of Goal: New goal  Previous Goal Met: New Goal    Nutrition Monitoring and Evaluation:   Behavioral-Environmental Outcomes: None Identified  Food/Nutrient Intake Outcomes: Progression of Nutrition  Physical Signs/Symptoms Outcomes: Biochemical Data, GI Status, Fluid Status or Edema, Skin, Weight    Discharge Planning:    Too soon to determine     Judi Michelle, MS, DTR  Reviewed and revised by:  Neeru Martell R.D., L.D.  Contact: [phone]       13-Jan-2021

## 2024-12-23 NOTE — PLAN OF CARE
Problem: Chronic Conditions and Co-morbidities  Goal: Patient's chronic conditions and co-morbidity symptoms are monitored and maintained or improved  Outcome: Progressing  Flowsheets (Taken 12/22/2024 2301)  Care Plan - Patient's Chronic Conditions and Co-Morbidity Symptoms are Monitored and Maintained or Improved:   Monitor and assess patient's chronic conditions and comorbid symptoms for stability, deterioration, or improvement   Collaborate with multidisciplinary team to address chronic and comorbid conditions and prevent exacerbation or deterioration   Update acute care plan with appropriate goals if chronic or comorbid symptoms are exacerbated and prevent overall improvement and discharge     Problem: Discharge Planning  Goal: Discharge to home or other facility with appropriate resources  Outcome: Progressing  Flowsheets  Taken 12/22/2024 2301  Discharge to home or other facility with appropriate resources:   Identify barriers to discharge with patient and caregiver   Arrange for needed discharge resources and transportation as appropriate   Identify discharge learning needs (meds, wound care, etc)   Refer to discharge planning if patient needs post-hospital services based on physician order or complex needs related to functional status, cognitive ability or social support system  Taken 12/22/2024 2217  Discharge to home or other facility with appropriate resources:   Identify barriers to discharge with patient and caregiver   Arrange for needed discharge resources and transportation as appropriate   Identify discharge learning needs (meds, wound care, etc)   Refer to discharge planning if patient needs post-hospital services based on physician order or complex needs related to functional status, cognitive ability or social support system     Problem: Safety - Adult  Goal: Free from fall injury  Outcome: Progressing  Flowsheets (Taken 12/22/2024 2211)  Free From Fall Injury:   Instruct family/caregiver on  patient safety   Based on caregiver fall risk screen, instruct family/caregiver to ask for assistance with transferring infant if caregiver noted to have fall risk factors     Problem: Pain  Goal: Verbalizes/displays adequate comfort level or baseline comfort level  Outcome: Progressing  Flowsheets (Taken 12/22/2024 2215)  Verbalizes/displays adequate comfort level or baseline comfort level:   Encourage patient to monitor pain and request assistance   Assess pain using appropriate pain scale   Administer analgesics based on type and severity of pain and evaluate response     Problem: ABCDS Injury Assessment  Goal: Absence of physical injury  Outcome: Progressing  Flowsheets (Taken 12/23/2024 0521)  Absence of Physical Injury: Implement safety measures based on patient assessment

## 2024-12-23 NOTE — CARE COORDINATION
Case Management Assessment  Initial Evaluation    Date/Time of Evaluation: 12/23/2024 9:52 AM  Assessment Completed by: Sia Louie RN    If patient is discharged prior to next notation, then this note serves as note for discharge by case management.    Patient Name: Doe Kapadia                   YOB: 1940  Diagnosis: Choledocholithiasis [K80.50]  Jaundice [R17]                   Date / Time: 12/22/2024  5:10 PM    Patient Admission Status: Inpatient   Readmission Risk (Low < 19, Mod (19-27), High > 27): Readmission Risk Score: 15.1    Current PCP: Zulma Hill MD  PCP verified by CM? Yes    Chart Reviewed: Yes      History Provided by: Patient  Patient Orientation: Alert and Oriented    Patient Cognition: Alert    Hospitalization in the last 30 days (Readmission):  No    If yes, Readmission Assessment in  Navigator will be completed.    Advance Directives:      Code Status: Full Code   Patient's Primary Decision Maker is: Legal Next of Kin    Primary Decision Maker: Corine Kapadia - Spouse - [phone]    Discharge Planning:    Patient lives with: Spouse/Significant Other Type of Home: House  Primary Care Giver: Self  Patient Support Systems include: Spouse/Significant Other, Family Members   Current Financial resources: Medicare  Current community resources: None  Current services prior to admission: Durable Medical Equipment            Current DME: Walker, Cane, Shower Chair, Glucometer            Type of Home Care services:  None    ADLS  Prior functional level: Assistance with the following:, Mobility  Current functional level: Assistance with the following:, Mobility    PT AM-PAC:   /24  OT AM-PAC:   /24    Family can provide assistance at DC: Yes  Would you like Case Management to discuss the discharge plan with any other family members/significant others, and if so, who? Yes (Spouse; Corine)  Plans to Return to Present Housing: Yes  Other Identified Issues/Barriers to RETURNING to  associated with the providers was provided to: Patient   Patient Representative Name:       The Patient and/or Patient Representative Agree with the Discharge Plan? Yes    Sia Louie RN  Case Management Department  Ph: [phone] Fax: [phone]

## 2024-12-23 NOTE — H&P
[facility]  Family Medicine      History & Physical              Date:   12/25/2024  Patient name:  Doe Kapadia  Date of admission:  12/22/2024  5:10 PM  MRN:   [id number]  YOB: 1940    CHIEF COMPLAINT:       Chief Complaint   Patient presents with    Generalized Body Aches    Jaundice    Dizziness         ASSESSMENT/PLAN       Hospital Problems             Last Modified POA    * (Principal) Choledocholithiasis 12/22/2024 Yes    Preoperative cardiovascular examination 12/25/2024 Yes    Coronary artery disease involving coronary bypass graft of native heart without angina pectoris 12/25/2024 Yes    Essential hypertension 12/25/2024 Yes    Type 2 diabetes mellitus without complication, with long-term current use of insulin (HCC) 12/25/2024 Yes    Jaundice 12/23/2024 Yes         ERCP today with GI  Advance diet as tolerated  Monitor LFTs  Hold home meds until pt tolerating diet, can then resume namenda. Will hold antihypertensives and rosuvastatin due to low BP and transaminitis          Full Code   ADULT DIET; Full Liquid   DVT:Lovenox       The severity of this patient's signs and symptoms (specify jaundice, choledocholithiasis) indicate the need for an inpatient admission.      Above plan discussed with the patient and family at bedside    Consultations:   Consults: IP CONSULT TO GI  IP CONSULT TO PRIMARY CARE PROVIDER  IP CONSULT TO GI  IP CONSULT TO GENERAL SURGERY  IP CONSULT TO CARDIOLOGY      HPI:       History Obtained From:  Patient and chart review.    The patient is a 84 y.o.  male who presented with jaundice over the past several weeks. For the past several months pt has been increasingly fatigued and weak. Family michael the patient to the ER, LFTs elevated significantly, CT A/P shows 7.5mm stone in the distal common bile duct and biliary duct dilatation.   The case was discussed with the ER physician, the decision was made to admit the patient to the hospital for ERCP and  the ampulla.  Moderate degree of intrahepatic and extrahepatic biliary ductal dilatation is now present. 6.5 mm stone identified within the cystic duct 2. Atrophic right kidney again demonstrated, with calculi again visualized within the distal right ureter, without evidence of hydroureter.  Minimal fullness of the right pelvocaliceal systems  is again demonstrated. 3. Diffuse hepatic steatosis 4. Scattered colonic diverticula, without evidence of diverticulitis           Shayne Haney MD  12/25/2024 10:05 AM

## 2024-12-23 NOTE — ED PROVIDER NOTES
[facility] EMERGENCY DEPARTMENT  Emergency Department  Faculty Attestation       I performed a history and physical examination of the patient and discussed management with the resident. I reviewed the resident’s note and agree with the documented findings including all diagnostic interpretations and plan of care. Any areas of disagreement are noted on the chart. I was personally present for the key portions of any procedures. I have documented in the chart those procedures where I was not present during the key portions. I have reviewed the emergency nurses triage note. I agree with the chief complaint, past medical history, past surgical history, allergies, medications, social and family history as documented unless otherwise noted below. Documentation of the HPI, Physical Exam and Medical Decision Making performed by hankibthuan is based on my personal performance of the HPI, PE and MDM. For Physician Assistant/ Nurse Practitioner cases/documentation I have personally evaluated this patient and have completed at least one if not all key elements of the E/M (history, physical exam, and MDM). Additional findings are as noted.    Pertinent Comments     Primary Care Physician: Zulma Hill MD    History: This is a 84 y.o. male who presents to the Emergency Department with complaint of    Chief Complaint   Patient presents with    Generalized Body Aches    Jaundice    Dizziness         Physical:    ED Triage Vitals [12/22/24 1708]   BP Systolic BP Percentile Diastolic BP Percentile Temp Temp Source Pulse Respirations SpO2   (!) 89/37 -- -- 97.3 °F (36.3 °C) Tympanic 70 15 100 %      Height Weight - Scale         1.702 m (5' 7\") 72.6 kg (160 lb)              RADIOLOGY:  No results found.    MDM/Plan:   Jaundice.  No pain.  Patient states that he may have noticed the jaundice yesterday.  His daughter who is visiting from [place] is the 1 that noticed it and recommended he come to the emergency department  my urgent intervention.  Total critical care time was 0 minutes.  This excludes any time for separately reportable procedures.     Jose Angel Goodwin DO  Attending Emergency Physician         Jose Angel Goodwin,   12/22/24 2046

## 2024-12-24 LAB
ALBUMIN SERPL-MCNC: 2.5 G/DL (ref 3.5–5.2)
ALP SERPL-CCNC: 1001 U/L (ref 40–129)
ALT SERPL-CCNC: 252 U/L (ref 10–50)
ANION GAP SERPL CALCULATED.3IONS-SCNC: 10 MMOL/L (ref 9–16)
AST SERPL-CCNC: 181 U/L (ref 10–50)
BASOPHILS # BLD: 0 K/UL (ref 0–0.2)
BASOPHILS NFR BLD: 0 % (ref 0–2)
BILIRUB SERPL-MCNC: 7.4 MG/DL (ref 0–1.2)
BUN SERPL-MCNC: 17 MG/DL (ref 8–23)
CALCIUM SERPL-MCNC: 8 MG/DL (ref 8.6–10.4)
CHLORIDE SERPL-SCNC: 102 MMOL/L (ref 98–107)
CO2 SERPL-SCNC: 21 MMOL/L (ref 20–31)
CREAT SERPL-MCNC: 1.2 MG/DL (ref 0.7–1.2)
EOSINOPHIL # BLD: 0.1 K/UL (ref 0–0.4)
EOSINOPHILS RELATIVE PERCENT: 2 % (ref 0–4)
ERYTHROCYTE [DISTWIDTH] IN BLOOD BY AUTOMATED COUNT: 13.3 % (ref 11.5–14.9)
GFR, ESTIMATED: 60 ML/MIN/1.73M2
GLUCOSE BLD-MCNC: 289 MG/DL (ref 75–110)
GLUCOSE SERPL-MCNC: 212 MG/DL (ref 74–99)
HCT VFR BLD AUTO: 31.8 % (ref 41–53)
HGB BLD-MCNC: 10.2 G/DL (ref 13.5–17.5)
LYMPHOCYTES NFR BLD: 1.8 K/UL (ref 1–4.8)
LYMPHOCYTES RELATIVE PERCENT: 34 % (ref 24–44)
MCH RBC QN AUTO: 32.2 PG (ref 26–34)
MCHC RBC AUTO-ENTMCNC: 32.1 G/DL (ref 31–37)
MCV RBC AUTO: 100.4 FL (ref 80–100)
MONOCYTES NFR BLD: 0.2 K/UL (ref 0.1–1.3)
MONOCYTES NFR BLD: 4 % (ref 1–7)
NEUTROPHILS NFR BLD: 60 % (ref 36–66)
NEUTS SEG NFR BLD: 3.3 K/UL (ref 1.3–9.1)
PLATELET # BLD AUTO: 204 K/UL (ref 150–450)
PMV BLD AUTO: 7.4 FL (ref 6–12)
POTASSIUM SERPL-SCNC: 3.9 MMOL/L (ref 3.7–5.3)
PROT SERPL-MCNC: 4.8 G/DL (ref 6.6–8.7)
RBC # BLD AUTO: 3.17 M/UL (ref 4.5–5.9)
SODIUM SERPL-SCNC: 133 MMOL/L (ref 136–145)
WBC OTHER # BLD: 5.4 K/UL (ref 3.5–11)

## 2024-12-24 PROCEDURE — 82947 ASSAY GLUCOSE BLOOD QUANT: CPT

## 2024-12-24 PROCEDURE — 6360000002 HC RX W HCPCS: Performed by: INTERNAL MEDICINE

## 2024-12-24 PROCEDURE — 2500000003 HC RX 250 WO HCPCS: Performed by: INTERNAL MEDICINE

## 2024-12-24 PROCEDURE — 99223 1ST HOSP IP/OBS HIGH 75: CPT | Performed by: STUDENT IN AN ORGANIZED HEALTH CARE EDUCATION/TRAINING PROGRAM

## 2024-12-24 PROCEDURE — 80053 COMPREHEN METABOLIC PANEL: CPT

## 2024-12-24 PROCEDURE — 6370000000 HC RX 637 (ALT 250 FOR IP): Performed by: STUDENT IN AN ORGANIZED HEALTH CARE EDUCATION/TRAINING PROGRAM

## 2024-12-24 PROCEDURE — 1200000000 HC SEMI PRIVATE

## 2024-12-24 PROCEDURE — 99232 SBSQ HOSP IP/OBS MODERATE 35: CPT | Performed by: STUDENT IN AN ORGANIZED HEALTH CARE EDUCATION/TRAINING PROGRAM

## 2024-12-24 PROCEDURE — 2580000003 HC RX 258: Performed by: STUDENT IN AN ORGANIZED HEALTH CARE EDUCATION/TRAINING PROGRAM

## 2024-12-24 PROCEDURE — 36415 COLL VENOUS BLD VENIPUNCTURE: CPT

## 2024-12-24 PROCEDURE — 85025 COMPLETE CBC W/AUTO DIFF WBC: CPT

## 2024-12-24 PROCEDURE — 99232 SBSQ HOSP IP/OBS MODERATE 35: CPT | Performed by: INTERNAL MEDICINE

## 2024-12-24 RX ORDER — MEMANTINE HYDROCHLORIDE 10 MG/1
5 TABLET ORAL 2 TIMES DAILY
Status: DISCONTINUED | OUTPATIENT
Start: 2024-12-24 | End: 2024-12-25 | Stop reason: HOSPADM

## 2024-12-24 RX ORDER — DEXTROSE MONOHYDRATE 100 MG/ML
INJECTION, SOLUTION INTRAVENOUS CONTINUOUS PRN
Status: DISCONTINUED | OUTPATIENT
Start: 2024-12-24 | End: 2024-12-25 | Stop reason: HOSPADM

## 2024-12-24 RX ORDER — ASPIRIN 81 MG/1
81 TABLET, CHEWABLE ORAL DAILY
Status: DISCONTINUED | OUTPATIENT
Start: 2024-12-24 | End: 2024-12-25 | Stop reason: HOSPADM

## 2024-12-24 RX ORDER — ROSUVASTATIN CALCIUM 40 MG/1
40 TABLET, COATED ORAL DAILY
Status: DISCONTINUED | OUTPATIENT
Start: 2024-12-24 | End: 2024-12-25 | Stop reason: HOSPADM

## 2024-12-24 RX ORDER — 0.9 % SODIUM CHLORIDE 0.9 %
500 INTRAVENOUS SOLUTION INTRAVENOUS ONCE
Status: COMPLETED | OUTPATIENT
Start: 2024-12-24 | End: 2024-12-24

## 2024-12-24 RX ORDER — LEVOTHYROXINE SODIUM 88 UG/1
88 TABLET ORAL DAILY
Status: DISCONTINUED | OUTPATIENT
Start: 2024-12-24 | End: 2024-12-25 | Stop reason: HOSPADM

## 2024-12-24 RX ADMIN — SODIUM CHLORIDE, PRESERVATIVE FREE 10 ML: 5 INJECTION INTRAVENOUS at 19:21

## 2024-12-24 RX ADMIN — MEMANTINE 5 MG: 10 TABLET ORAL at 19:21

## 2024-12-24 RX ADMIN — SODIUM CHLORIDE 500 ML: 9 INJECTION, SOLUTION INTRAVENOUS at 14:47

## 2024-12-24 RX ADMIN — SODIUM CHLORIDE, PRESERVATIVE FREE 10 ML: 5 INJECTION INTRAVENOUS at 10:03

## 2024-12-24 RX ADMIN — ASPIRIN 81 MG: 81 TABLET, CHEWABLE ORAL at 14:47

## 2024-12-24 RX ADMIN — TICAGRELOR 90 MG: 90 TABLET ORAL at 14:52

## 2024-12-24 RX ADMIN — MEMANTINE 5 MG: 10 TABLET ORAL at 14:52

## 2024-12-24 RX ADMIN — LEVOTHYROXINE SODIUM 88 MCG: 0.09 TABLET ORAL at 14:52

## 2024-12-24 NOTE — CARE COORDINATION
ONGOING DISCHARGE PLAN:    Patient is alert and oriented x4.    Spoke with patient regarding discharge plan and patient confirms that plan is still to return home with spouse once medically ready.    DME: Walker, cane, GB, SC, glucometer.    VNS: None.    GI-choledocolithiasis. POD #1 ERCP.    General surgery-possible cholecystectomy, awaiting cardiology input.    Cardiology-clearance.     IMM letter provided to patient.  Patient offered four hours to make informed decision regarding appeal process; patient agreeable to discharge.     Will continue to follow for additional discharge needs.    If patient is discharged prior to next notation, then this note serves as note for discharge by case management.    Electronically signed by Sia Louie RN on 12/24/2024 at 10:30 AM

## 2024-12-24 NOTE — CONSULTS
General Surgery:  Consult Note        PATIENT NAME: Doe Kapadia   YOB: 1940    ADMISSION DATE: 12/22/2024  5:10 PM     Admitting Provider:      TODAY'S DATE: 12/24/2024    Chief Complaint: Choledocholithiasis  Consult Regarding: Above    HISTORY OF PRESENT ILLNESS:  The patient is a 84 y.o. male  who was admitted on 12/23/24 for painless jaundice and CT findings concerning for choledocholithiasis.  The patient underwent an ERCP yesterday with GI with sphincterotomy and clearing of the CBD.  Patient has significant past medical history including CHF, hypertension, hyperlipidemia, coronary disease.  Patient is unfortunately a poor historian.  But denies any previous abdominal surgeries.  I discussed with him that after choledocholithiasis event typically is recommended to have the gallbladder removed.  At this time he is not interested in having surgery but I did inform him that I would talk with his wife and family.  He denies any acute complaints at this time.  He denies any fever, chills, nausea, emesis.    Called and spoke with Corine, his wife ([phone] ), and his daughter over the phone.  Introduced myself and explained to them the reason for general surgery consult and the request for cholecystectomy.  They voiced understanding and plan to come in today to speak with Yo to talk about potentially moving forward with cholecystectomy.  I did let them know that he originally stated he was not interested and they said they will speak with him more. I did explain to them that his nutritional markers are very low, albumin is 2.5 and with his significant cardiac history and significant obstructive jaundice event that we will need to discuss with all teams about optimal surgical timing.  Appreciate medicine/cardiology for surgical risk stratification.       Past Medical History:        Diagnosis Date    Arthritis     CAD (coronary artery disease)     CHF (congestive heart failure) (HCC)      Diabetes mellitus (HCC)     History of blood transfusion     Hyperlipidemia     Hypertension     PVC (premature ventricular contraction)     SOB (shortness of breath)     Thyroid disease        Past Surgical History:        Procedure Laterality Date    CARDIAC SURGERY      COLONOSCOPY  10/5/2011    normal    COLONOSCOPY  3/7/2006    small internal hemorrhoids, diverticulosis    COLONOSCOPY  11/14/2001    small internal hemorrhoids, hyperplastic polyp    CORONARY ANGIOPLASTY WITH STENT PLACEMENT  05/06/2022    EYE SURGERY      Cataract    JOINT REPLACEMENT      Knee        Medications Prior to Admission:   Medications Prior to Admission: memantine (NAMENDA) 10 MG tablet, Take 0.5 tablets by mouth 2 times daily  magnesium oxide (MAG-OX) 400 MG tablet, Take 1 tablet by mouth 2 times daily  metoprolol succinate (TOPROL XL) 25 MG extended release tablet, Take 1 tablet by mouth daily  Rosuvastatin Calcium 40 MG CPSP, Take 40 mg by mouth daily   acetaminophen (TYLENOL) 500 MG tablet, Take 2 tablets by mouth every 6 hours as needed for Pain  Multiple Vitamins-Minerals (THERAPEUTIC MULTIVITAMIN-MINERALS) tablet, Take 1 tablet by mouth daily  levothyroxine (SYNTHROID) 88 MCG tablet, Take 1 tablet by mouth daily 1 tablet daily  vitamin D (CHOLECALCIFEROL) 1000 UNIT TABS tablet, Take 1 tablet by mouth daily  metFORMIN (GLUCOPHAGE) 1000 MG tablet, Take 1 tablet by mouth 2 times daily (with meals)  aspirin 81 MG chewable tablet, Take 1 tablet by mouth daily  losartan (COZAAR) 25 MG tablet, Take 1 tablet by mouth daily Monitor BP If 140/90 or higher needs to be seen to discuss increasing med  insulin glargine, 1 unit dial, (TOUJEO SOLOSTAR) 300 UNIT/ML concentrated injection pen, Inject 20 Units into the skin nightly  Insulin Lispro w/ Trans Port 100 UNIT/ML SOPN, Up to 12 units 3 times a day before meals  ticagrelor (BRILINTA) 90 MG TABS tablet, Take 1 tablet by mouth 2 times daily  Insulin NPH Isophane & Regular

## 2024-12-24 NOTE — DISCHARGE INSTR - COC
Continuity of Care Form    Patient Name: Doe Kapadia   :  1940  MRN:  [id number]    Admit date:  2024  Discharge date:  ***    Code Status Order: Full Code   Advance Directives:   Advance Care Flowsheet Documentation        Date/Time Healthcare Directive Type of Healthcare Directive Copy in Chart Healthcare Agent Appointed Healthcare Agent's Name Healthcare Agent's Phone Number    24 1427 Yes, patient has an advance directive for healthcare treatment  Durable power of  for health care;Living will  No, copy requested from family  --  --  --     24 1421 No, patient does not have an advance directive for healthcare treatment  --  --  --  --  --                     Admitting Physician:  Renee Worley MD  PCP: Zulma Hill MD    Discharging Nurse: ***  Discharging Hospital Unit/Room#: 0/-01  Discharging Unit Phone Number: ***    Emergency Contact:   Extended Emergency Contact Information  Primary Emergency Contact: Corine Kapadia  Address: [address] [place] [address]  Home Phone: [phone]  Mobile Phone: [phone]  Relation: Spouse  Secondary Emergency Contact: Danica Hernandez  Home Phone: [phone]  Mobile Phone: [phone]  Relation: Child    Past Surgical History:  Past Surgical History:   Procedure Laterality Date    CARDIAC SURGERY      COLONOSCOPY  10/5/2011    normal    COLONOSCOPY  3/7/2006    small internal hemorrhoids, diverticulosis    COLONOSCOPY  2001    small internal hemorrhoids, hyperplastic polyp    CORONARY ANGIOPLASTY WITH STENT PLACEMENT  2022    ERCP N/A 2024    ENDOSCOPIC RETROGRADE CHOLANGIOPANCREATOGRAPHY STONE REMOVAL performed by Santy Gurrola MD at [facility] [[email or web address] ENDO    EYE SURGERY      Cataract    JOINT REPLACEMENT      Knee        Immunization History:   Immunization History   Administered Date(s) Administered    COVID-19, MODERNA BLUE border, Primary or Immunocompromised, (age 12y+), IM, 100 mcg/0.5mL  Date:}    Treatments at the Time of Hospital Discharge:   Respiratory Treatments: ***  Oxygen Therapy:  {Therapy; copd oxygen:16696}  Ventilator:    {[facility] Vent List:113206031}    Rehab Therapies: {THERAPEUTIC INTERVENTION:9102918861}  Weight Bearing Status/Restrictions: { CC Weight Bearin}  Other Medical Equipment (for information only, NOT a DME order):  {EQUIPMENT:736489506}  Other Treatments: ***    Patient's personal belongings (please select all that are sent with patient):  {CHP DME Belongings:142602243}    RN SIGNATURE:  {Esignature:736897052}    CASE MANAGEMENT/SOCIAL WORK SECTION    Inpatient Status Date: ***    Readmission Risk Assessment Score:  [facility] RISK OF UNPLANNED READMISSION 2.0             14.9 Total Score        Discharging to Facility/ Agency   Name:   Address:  Phone:  Fax:    Dialysis Facility (if applicable)   Name:  Address:  Dialysis Schedule:  Phone:  Fax:    / signature: {Esignature:663383907}    PHYSICIAN SECTION    Prognosis: {Prognosis:9943094606}    Condition at Discharge: { Patient Condition:541497570}    Rehab Potential (if transferring to Rehab): {Prognosis:6722555954}    Recommended Labs or Other Treatments After Discharge: ***    Physician Certification: I certify the above information and transfer of Doe Kapadia  is necessary for the continuing treatment of the diagnosis listed and that he requires {Admit to Appropriate Level of Care:19802} for {GREATER/LESS:390078850} 30 days.     Update Admission H&P: {CHP DME Changes in HandP:665320195}    PHYSICIAN SIGNATURE:  {Esignature:894895954}   Patent

## 2024-12-24 NOTE — PROGRESS NOTES
[facility]   Gastroenterology Progress Note    Doe Kapadia is a 84 y.o. male patient.  Hospitalization Day:2      Chief consult reason:   Choledocholithiasis  Jaundice  Subjective:  Patient seen and examined.  Patient had ERCP completed yesterday with removal of 3 large stones and copious amounts of sludge.  Plastic biliary stent placed to CBD to facilitate further drainage.   LFTs drastically improved today  Alk phos [id number], -252, -181, T. bili 11.5-7.4  No leukocytosis, globin stable 10.2 g/dL  VITALS:  BP (!) 91/50   Pulse 64   Temp 98.1 °F (36.7 °C) (Oral)   Resp 16   Ht 1.702 m (5' 7.01\")   Wt 72.6 kg (160 lb)   SpO2 95%   BMI 25.05 kg/m²   TEMPERATURE:  Current - Temp: 98.1 °F (36.7 °C); Max - Temp  Av.7 °F (36.5 °C)  Min: 97.3 °F (36.3 °C)  Max: 98.1 °F (36.7 °C)    Physical Assessment:  General appearance:  alert, cooperative and no distress  Mental Status:  oriented to person, place and time and normal affect  Lungs:  clear to auscultation bilaterally, normal effort  Heart:  regular rate and rhythm, no murmur  Abdomen:  soft, nontender, nondistended, normal bowel sounds, no masses, hepatomegaly, splenomegaly  Extremities:  no edema, redness, tenderness in the calves  Skin:  no gross lesions, rashes, induration    Data Review:    Labs and Imaging:       CBC:  Recent Labs     24  1728 24  0609 24  0553   WBC 6.3 6.8 5.4   HGB 12.9* 11.9* 10.2*   .8* 101.5* 100.4*   RDW 13.8 13.6 13.3    234 204       ANEMIA STUDIES:  No results for input(s): \"TIBC\", \"FERRITIN\", \"WLTAJJFO58\", \"FOLATE\", \"OCCULTBLD\" in the last 72 hours.    Invalid input(s): \"LABIRON\"    BMP:  Recent Labs     24  1836 24  0609 24  0553   * 130* 133*   K 4.2 4.3 3.9    98 102   CO2 15* 20 21   BUN 22 20 17   CREATININE 1.3* 1.4* 1.2   GLUCOSE 232* 233* 212*   CALCIUM 8.1* 8.9 8.0*       LFTS:  Recent Labs     24  12/23/24  0609 12/24/24  0553   ALKPHOS 1,177* 1,407* 1,001*   * 379* 252*   * 396* 181*   BILITOT 11.5* 13.9* 7.4*   BILIDIR 8.6*  --   --        Other pertinent labs:      Gastroenterology impression and plan:    Choledocholithiasis with sludge status post ERCP with sludge removal, stent placement  -LFTs greatly improving  -Awaiting surgical input    Plan:    Continue supportive care per primary  Okay for clear liquid diet may advance as tolerated from GI perspective but please clarify with surgery team before advancing  Labs including CBC CMP INR  Patient will need repeat ERCP in 8 weeks for stent removal  Continue antibiotics  Will follow      This plan was formulated in collaboration with MD Addie    Thank you for allowing me to participate in the care of your patient.  Please feel free to contact me with any questions or concerns.     [facility] Gastroenterology   [facility] Mendoza, APRN - CNP   [phone]  12/24/2024  9:47 AM    Estimated time of 20  mins reviewing chart, assessing patient and formulating plan of care    This note was created with the assistance of a speech-recognition program.  Although the intention is to generate a document that actually reflects the content of the visit, no guarantees can be provided that every mistake has been identified and corrected by editing.     Attested:    I have discussed the care of Doe Kapadia and I have examined the patient myselft and taken ros and hpi , including pertinent history and exam findings,  with the author of this note . I have reviewed the key elements of all parts of the encounter with the nurse practitioner/resident.  I agree with the assessment, plan and orders as documented by the above health care provider with the following addendum    Impression- Choledocholithiasis S/P ERCP       Plan- Surgical consult for lap sunny  Follow on LFT's    More than 50% of the time was spent taking care of

## 2024-12-24 NOTE — PLAN OF CARE
Problem: Chronic Conditions and Co-morbidities  Goal: Patient's chronic conditions and co-morbidity symptoms are monitored and maintained or improved  Outcome: Progressing  Flowsheets (Taken 12/23/2024 2003)  Care Plan - Patient's Chronic Conditions and Co-Morbidity Symptoms are Monitored and Maintained or Improved: Monitor and assess patient's chronic conditions and comorbid symptoms for stability, deterioration, or improvement     Problem: Discharge Planning  Goal: Discharge to home or other facility with appropriate resources  Outcome: Progressing     Problem: Safety - Adult  Goal: Free from fall injury  Outcome: Progressing

## 2024-12-25 VITALS
BODY MASS INDEX: 25.11 KG/M2 | DIASTOLIC BLOOD PRESSURE: 71 MMHG | OXYGEN SATURATION: 99 % | SYSTOLIC BLOOD PRESSURE: 105 MMHG | HEART RATE: 76 BPM | TEMPERATURE: 97.9 F | RESPIRATION RATE: 16 BRPM | HEIGHT: 67 IN | WEIGHT: 160 LBS

## 2024-12-25 PROBLEM — I25.810 CORONARY ARTERY DISEASE INVOLVING CORONARY BYPASS GRAFT OF NATIVE HEART WITHOUT ANGINA PECTORIS: Status: ACTIVE | Noted: 2024-12-25

## 2024-12-25 PROBLEM — Z01.810 PREOPERATIVE CARDIOVASCULAR EXAMINATION: Status: ACTIVE | Noted: 2024-12-25

## 2024-12-25 LAB
ALBUMIN SERPL-MCNC: 2.4 G/DL (ref 3.5–5.2)
ALP SERPL-CCNC: 883 U/L (ref 40–129)
ALT SERPL-CCNC: 191 U/L (ref 10–50)
ANION GAP SERPL CALCULATED.3IONS-SCNC: 8 MMOL/L (ref 9–16)
AST SERPL-CCNC: 98 U/L (ref 10–50)
BASOPHILS # BLD: 0 K/UL (ref 0–0.2)
BASOPHILS NFR BLD: 1 % (ref 0–2)
BILIRUB SERPL-MCNC: 5.6 MG/DL (ref 0–1.2)
BUN SERPL-MCNC: 9 MG/DL (ref 8–23)
CALCIUM SERPL-MCNC: 7.9 MG/DL (ref 8.6–10.4)
CHLORIDE SERPL-SCNC: 101 MMOL/L (ref 98–107)
CO2 SERPL-SCNC: 22 MMOL/L (ref 20–31)
CREAT SERPL-MCNC: 1.1 MG/DL (ref 0.7–1.2)
EOSINOPHIL # BLD: 0.3 K/UL (ref 0–0.4)
EOSINOPHILS RELATIVE PERCENT: 4 % (ref 0–4)
ERYTHROCYTE [DISTWIDTH] IN BLOOD BY AUTOMATED COUNT: 13.5 % (ref 11.5–14.9)
GFR, ESTIMATED: 67 ML/MIN/1.73M2
GLUCOSE BLD-MCNC: 252 MG/DL (ref 75–110)
GLUCOSE BLD-MCNC: 313 MG/DL (ref 75–110)
GLUCOSE SERPL-MCNC: 232 MG/DL (ref 74–99)
HCT VFR BLD AUTO: 32.1 % (ref 41–53)
HGB BLD-MCNC: 10.4 G/DL (ref 13.5–17.5)
LYMPHOCYTES NFR BLD: 1.1 K/UL (ref 1–4.8)
LYMPHOCYTES RELATIVE PERCENT: 16 % (ref 24–44)
MCH RBC QN AUTO: 32.9 PG (ref 26–34)
MCHC RBC AUTO-ENTMCNC: 32.5 G/DL (ref 31–37)
MCV RBC AUTO: 101 FL (ref 80–100)
MONOCYTES NFR BLD: 0.4 K/UL (ref 0.1–1.3)
MONOCYTES NFR BLD: 6 % (ref 1–7)
NEUTROPHILS NFR BLD: 73 % (ref 36–66)
NEUTS SEG NFR BLD: 5.2 K/UL (ref 1.3–9.1)
PLATELET # BLD AUTO: 191 K/UL (ref 150–450)
PMV BLD AUTO: 7.7 FL (ref 6–12)
POTASSIUM SERPL-SCNC: 3.8 MMOL/L (ref 3.7–5.3)
PROT SERPL-MCNC: 5.1 G/DL (ref 6.6–8.7)
RBC # BLD AUTO: 3.17 M/UL (ref 4.5–5.9)
SODIUM SERPL-SCNC: 131 MMOL/L (ref 136–145)
WBC OTHER # BLD: 7.1 K/UL (ref 3.5–11)

## 2024-12-25 PROCEDURE — 99221 1ST HOSP IP/OBS SF/LOW 40: CPT | Performed by: STUDENT IN AN ORGANIZED HEALTH CARE EDUCATION/TRAINING PROGRAM

## 2024-12-25 PROCEDURE — 6360000002 HC RX W HCPCS: Performed by: INTERNAL MEDICINE

## 2024-12-25 PROCEDURE — 99232 SBSQ HOSP IP/OBS MODERATE 35: CPT | Performed by: INTERNAL MEDICINE

## 2024-12-25 PROCEDURE — 99239 HOSP IP/OBS DSCHRG MGMT >30: CPT | Performed by: STUDENT IN AN ORGANIZED HEALTH CARE EDUCATION/TRAINING PROGRAM

## 2024-12-25 PROCEDURE — 85025 COMPLETE CBC W/AUTO DIFF WBC: CPT

## 2024-12-25 PROCEDURE — 99223 1ST HOSP IP/OBS HIGH 75: CPT | Performed by: INTERNAL MEDICINE

## 2024-12-25 PROCEDURE — 80053 COMPREHEN METABOLIC PANEL: CPT

## 2024-12-25 PROCEDURE — 2500000003 HC RX 250 WO HCPCS: Performed by: INTERNAL MEDICINE

## 2024-12-25 PROCEDURE — 6370000000 HC RX 637 (ALT 250 FOR IP): Performed by: STUDENT IN AN ORGANIZED HEALTH CARE EDUCATION/TRAINING PROGRAM

## 2024-12-25 PROCEDURE — 36415 COLL VENOUS BLD VENIPUNCTURE: CPT

## 2024-12-25 PROCEDURE — 82947 ASSAY GLUCOSE BLOOD QUANT: CPT

## 2024-12-25 PROCEDURE — 2580000003 HC RX 258: Performed by: STUDENT IN AN ORGANIZED HEALTH CARE EDUCATION/TRAINING PROGRAM

## 2024-12-25 PROCEDURE — 93005 ELECTROCARDIOGRAM TRACING: CPT | Performed by: INTERNAL MEDICINE

## 2024-12-25 RX ORDER — LOSARTAN POTASSIUM 25 MG/1
25 TABLET ORAL DAILY
Qty: 30 TABLET | Refills: 1 | Status: SHIPPED
Start: 2024-12-25 | End: 2025-01-24

## 2024-12-25 RX ORDER — INSULIN GLARGINE 100 [IU]/ML
16 INJECTION, SOLUTION SUBCUTANEOUS NIGHTLY
Status: DISCONTINUED | OUTPATIENT
Start: 2024-12-25 | End: 2024-12-25 | Stop reason: HOSPADM

## 2024-12-25 RX ORDER — 0.9 % SODIUM CHLORIDE 0.9 %
200 INTRAVENOUS SOLUTION INTRAVENOUS ONCE
Status: COMPLETED | OUTPATIENT
Start: 2024-12-25 | End: 2024-12-25

## 2024-12-25 RX ORDER — METOPROLOL SUCCINATE 25 MG/1
25 TABLET, EXTENDED RELEASE ORAL DAILY
Qty: 30 TABLET | Refills: 3 | Status: SHIPPED
Start: 2024-12-25

## 2024-12-25 RX ORDER — INSULIN LISPRO 100 [IU]/ML
0-8 INJECTION, SOLUTION INTRAVENOUS; SUBCUTANEOUS
Status: DISCONTINUED | OUTPATIENT
Start: 2024-12-25 | End: 2024-12-25 | Stop reason: HOSPADM

## 2024-12-25 RX ADMIN — INSULIN LISPRO 4 UNITS: 100 INJECTION, SOLUTION INTRAVENOUS; SUBCUTANEOUS at 16:40

## 2024-12-25 RX ADMIN — ASPIRIN 81 MG: 81 TABLET, CHEWABLE ORAL at 10:47

## 2024-12-25 RX ADMIN — ENOXAPARIN SODIUM 40 MG: 100 INJECTION SUBCUTANEOUS at 08:54

## 2024-12-25 RX ADMIN — SODIUM CHLORIDE, PRESERVATIVE FREE 10 ML: 5 INJECTION INTRAVENOUS at 08:56

## 2024-12-25 RX ADMIN — SODIUM CHLORIDE 200 ML: 9 INJECTION, SOLUTION INTRAVENOUS at 15:31

## 2024-12-25 RX ADMIN — INSULIN LISPRO 6 UNITS: 100 INJECTION, SOLUTION INTRAVENOUS; SUBCUTANEOUS at 11:18

## 2024-12-25 RX ADMIN — LEVOTHYROXINE SODIUM 88 MCG: 0.09 TABLET ORAL at 08:54

## 2024-12-25 RX ADMIN — TICAGRELOR 90 MG: 90 TABLET ORAL at 08:54

## 2024-12-25 RX ADMIN — MEMANTINE 5 MG: 10 TABLET ORAL at 08:54

## 2024-12-25 ASSESSMENT — ENCOUNTER SYMPTOMS
SHORTNESS OF BREATH: 0
COLOR CHANGE: 1
ABDOMINAL PAIN: 0

## 2024-12-25 NOTE — PROGRESS NOTES
General Surgery:  Daily Progress Note                  PATIENT NAME: Doe Kapadia   TODAY'S DATE: 12/25/2024, 2:48 AM    SUBJECTIVE:     Pt seen and examined at bedside. No pain. Denies fever, chills, nausea, vomiting, chest pain, and SOB. Tolerating diet.  No complaints. Discussed with nursing, patients family is planning to come by later today to discuss decision on in pt vs out pt cholecystectomy.       Tmax 36.7    OBJECTIVE:   VITALS:  BP 96/65   Pulse 71   Temp 97.8 °F (36.6 °C) (Oral)   Resp 16   Ht 1.702 m (5' 7.01\")   Wt 72.6 kg (160 lb)   SpO2 100%   BMI 25.05 kg/m²      INTAKE/OUTPUT:    No intake or output data in the 24 hours ending 12/25/24 0248    PHYSICAL EXAM:  General Appearance:  awake, alert, in no acute distress  HEENT:  Normocephalic, atraumatic, mucus membranes moist   Heart:  Regular rate and rhythm   Lungs: equal chest rise and fall, no audible wheezing   Abdomen: soft, non tender, no rebound, no guarding   Extremities: No cyanosis, pitting edema, rashes noted.    Skin: Skin color, texture, turgor normal. No rashes or lesions.    Data:  CBC with Differential:    Lab Results   Component Value Date/Time    WBC 5.4 12/24/2024 05:53 AM    RBC 3.17 12/24/2024 05:53 AM    RBC 4.19 05/29/2012 08:18 AM    HGB 10.2 12/24/2024 05:53 AM    HCT 31.8 12/24/2024 05:53 AM     12/24/2024 05:53 AM     05/29/2012 08:18 AM    .4 12/24/2024 05:53 AM    MCH 32.2 12/24/2024 05:53 AM    MCHC 32.1 12/24/2024 05:53 AM    RDW 13.3 12/24/2024 05:53 AM    LYMPHOPCT 34 12/24/2024 05:53 AM    MONOPCT 4 12/24/2024 05:53 AM    EOSPCT 2 12/24/2024 05:53 AM    BASOPCT 0 12/24/2024 05:53 AM    MONOSABS 0.20 12/24/2024 05:53 AM    LYMPHSABS 1.80 12/24/2024 05:53 AM    EOSABS 0.10 12/24/2024 05:53 AM    BASOSABS 0.00 12/24/2024 05:53 AM    DIFFTYPE NOT REPORTED 12/14/2021 09:17 AM     CMP:    Lab Results   Component Value Date/Time     12/24/2024 05:53 AM    K 3.9 12/24/2024 05:53 AM    CL  102 12/24/2024 05:53 AM    CO2 21 12/24/2024 05:53 AM    BUN 17 12/24/2024 05:53 AM    CREATININE 1.2 12/24/2024 05:53 AM    GFRAA 59 06/15/2022 02:15 AM    LABGLOM 60 12/24/2024 05:53 AM    LABGLOM >60 03/18/2024 07:09 AM    LABGLOM 58 10/16/2023 12:00 AM    GLUCOSE 212 12/24/2024 05:53 AM    GLUCOSE 115 05/29/2012 08:18 AM    CALCIUM 8.0 12/24/2024 05:53 AM    BILITOT 7.4 12/24/2024 05:53 AM    ALKPHOS 1,001 12/24/2024 05:53 AM     12/24/2024 05:53 AM     12/24/2024 05:53 AM       Radiology Review:     ASSESSMENT:  Active Hospital Problems    Diagnosis Date Noted    Jaundice [R17] 12/23/2024    Choledocholithiasis [K80.50] 12/22/2024     83 yo M w/ obstructive jaundice and choledocholithiasis status post 12/23 ERCP with sphincterotomy and stent placement, Hx of CAD, CHF, DM, hypertension, PVCs, hyperlipidemia     Plan:  Appreciate cardiology and IM surgical risk stratification, awaiting recommendations   Follow up with family on decision for timing of cholecystectomy, wife/Corine said she plans to continue to discuss with patient   Follow daily labs and encourage adequate protein intake. Cont to hold ASA and other anticoagulants if ok per primary/cardiology.   Ok for diet at this time from gen surg stance until further decision made on in patient vs out patient cholecystectomy timing. Encourage a low to no fat diet and avoiding spicy/greasy foods     Thank you,    Electronically signed by Joey Aguilar DO  on 12/25/2024 at 2:48 AM

## 2024-12-25 NOTE — CARE COORDINATION
DISCHARGE PLANNING NOTE:    Reviewed previous case management notes, and discharge plan is home without needs. VNS has been declined.    Inpatient VS. Outpatient cholecystectomy. Regular diet.    Will continue to follow for additional discharge needs.    Electronically signed by Shae Smith RN on 12/25/2024 at 2:10 PM

## 2024-12-25 NOTE — PROGRESS NOTES
[facility]   Gastroenterology Progress Note    Doe Kapadia is a 84 y.o. male patient.  Hospitalization Day:3      Chief consult reason:   Choledocholithiasis  Jaundice  Subjective:  Patient seen and examined.  No acute events overnight  LFTs continue to improve  Would like to have surgery as an outpatient    VITALS:  /71   Pulse 76   Temp 97.9 °F (36.6 °C)   Resp 16   Ht 1.702 m (5' 7.01\")   Wt 72.6 kg (160 lb)   SpO2 99%   BMI 25.05 kg/m²   TEMPERATURE:  Current - Temp: 97.9 °F (36.6 °C); Max - Temp  Av.8 °F (36.6 °C)  Min: 97.7 °F (36.5 °C)  Max: 97.9 °F (36.6 °C)    Physical Assessment:  General appearance:  alert, cooperative and no distress  Mental Status:  oriented to person, place and time and normal affect  Lungs:  clear to auscultation bilaterally, normal effort  Heart:  regular rate and rhythm, no murmur  Abdomen:  soft, nontender, nondistended, normal bowel sounds, no masses, hepatomegaly, splenomegaly  Extremities:  no edema, redness, tenderness in the calves  Skin:  no gross lesions, rashes, induration    Data Review:    Labs and Imaging:       CBC:  Recent Labs     24  1728 24  0609 24  0553 24  0705   WBC 6.3 6.8 5.4 7.1   HGB 12.9* 11.9* 10.2* 10.4*   .8* 101.5* 100.4* 101.0*   RDW 13.8 13.6 13.3 13.5    234 204 191       ANEMIA STUDIES:  No results for input(s): \"TIBC\", \"FERRITIN\", \"QEIISZEO21\", \"FOLATE\", \"OCCULTBLD\" in the last 72 hours.    Invalid input(s): \"LABIRON\"    BMP:  Recent Labs     24  0609 24  0553 24  0705   * 133* 131*   K 4.3 3.9 3.8   CL 98 102 101   CO2 20 21 22   BUN 20 17 9   CREATININE 1.4* 1.2 1.1   GLUCOSE 233* 212* 232*   CALCIUM 8.9 8.0* 7.9*       LFTS:  Recent Labs     24  1836 24  0609 24  0553 24  0705   ALKPHOS 1,177* 1,407* 1,001* 883*   * 379* 252* 191*   * 396* 181* 98*   BILITOT 11.5* 13.9* 7.4* 5.6*   BILIDIR 8.6*  --   --    --        Other pertinent labs:      Gastroenterology impression and plan:    Choledocholithiasis with sludge status post ERCP with sludge removal, stent placement  -LFTs greatly improving  -Awaiting surgical input  Acute cholecystitis-management per surgery-outpatient lap sunny planned    Plan:    Okay for low fat diet as tolerated  Will need to follow-up in office with  3 to 4 weeks and then to be scheduled for repeat ERCP for stent removal in 6 to 8 weeks  Appreciate surgical plan  No further recommendations at this time GI will sign off      This plan was formulated in collaboration with MD Addie    Thank you for allowing me to participate in the care of your patient.  Please feel free to contact me with any questions or concerns.     [facility] Gastroenterology   Berto Mendoza, HAYLEE - CNP   [phone]  12/25/2024  8:45 AM    Estimated time of 20  mins reviewing chart, assessing patient and formulating plan of care    This note was created with the assistance of a speech-recognition program.  Although the intention is to generate a document that actually reflects the content of the visit, no guarantees can be provided that every mistake has been identified and corrected by editing.     Attested:    I have discussed the care of Doe Kapadia and I have examined the patient myselft and taken ros and hpi , including pertinent history and exam findings,  with the author of this note . I have reviewed the key elements of all parts of the encounter with the nurse practitioner/resident.  I agree with the assessment, plan and orders as documented by the above health care provider with the following addendum    Impression- Choledocholithiasis S/P ERCP       Plan- Surgical consult for lap sunny  Follow up in GI office, needs repeat ERCP in 6-8 weeks    GI will sign off, please call with questions      More than 50% of the time was spent taking care of this patient in addition

## 2024-12-25 NOTE — PLAN OF CARE
Problem: Chronic Conditions and Co-morbidities  Goal: Patient's chronic conditions and co-morbidity symptoms are monitored and maintained or improved  Outcome: Adequate for Discharge     Problem: Discharge Planning  Goal: Discharge to home or other facility with appropriate resources  Outcome: Adequate for Discharge     Problem: Safety - Adult  Goal: Free from fall injury  Outcome: Adequate for Discharge     Problem: Pain  Goal: Verbalizes/displays adequate comfort level or baseline comfort level  Outcome: Adequate for Discharge     Problem: ABCDS Injury Assessment  Goal: Absence of physical injury  Outcome: Adequate for Discharge     Problem: Nutrition Deficit:  Goal: Optimize nutritional status  Outcome: Adequate for Discharge

## 2024-12-25 NOTE — PLAN OF CARE
Problem: Chronic Conditions and Co-morbidities  Goal: Patient's chronic conditions and co-morbidity symptoms are monitored and maintained or improved  Outcome: Progressing  Flowsheets (Taken 12/24/2024 1928)  Care Plan - Patient's Chronic Conditions and Co-Morbidity Symptoms are Monitored and Maintained or Improved: Monitor and assess patient's chronic conditions and comorbid symptoms for stability, deterioration, or improvement     Problem: Discharge Planning  Goal: Discharge to home or other facility with appropriate resources  Outcome: Progressing  Flowsheets (Taken 12/24/2024 1928)  Discharge to home or other facility with appropriate resources: Arrange for needed discharge resources and transportation as appropriate     Problem: Safety - Adult  Goal: Free from fall injury  Outcome: Progressing

## 2024-12-25 NOTE — DISCHARGE INSTRUCTIONS
Next Steps:    Get an echocardiogram  2. Call Dr. Vidales to get clearance for surgery  3. Follow up with Dr. Hill in 1 week  4. Follow up with DR. TORREZ for surgery to schedule the gallbladder removal  4. Drink ensure high protein 3 times daily  5. Follow up with GI to get stent removed in 8 weeks  6. DO NOT Take rosuvastatin until Dr. Hill approves it once your liver numbers are back to normal  7. Check your BP every day, if it is low ( Under 110/70) do NOT take metoprolol or losartan. I put hold parameters on each of these medications.     I will order a repeat liver panel to be done in 1 week

## 2024-12-25 NOTE — CONSULTS
Auscultation: Good respiratory effort. No for increased work of breathing. On auscultation: clear  Cardiovascular:  Heart tones are crisp and normal. regular S1 and S2. Murmurs: none  Jugular venous pulsation Normal  Abdomen:  No masses or tenderness  Bowel sounds present  Extremities:   No Cyanosis or Clubbing   Lower extremity edema: none   Skin: Warm and dry  Neurological:  Alert and oriented.  Moves all extremities well  No abnormalities of mood, affect, memory, mentation, or behavior are noted    DATA:    Diagnostics:      EKG: Sinus, right bundle      Echo:    Results for orders placed or performed during the hospital encounter of 09/26/19   ECHO Complete 2D W Doppler W Color   Result Value Ref Range    Left Ventricular Ejection Fraction 55     LVEF MODALITY ECHO     Narrative    [facility]    Transthoracic Echocardiography Report (TTE)     Patient Name TELMA       Date of Study           09/26/2019                WILIAM SINGH      Date of      1940  Gender                  Male   Birth      Age          79 year(s)  Race                    Other      Room Number              Height:                 68 inch, 172.72 cm      Corporate ID [id number]   #      Patient Acct [id number]   #      MR #         [id number]      Sonographer             Shirin Lundberg      Accession #  [id number]   Interpreting Physician  [facility]                                                    Ezequiel Vasquez      Fellow                   Referring Nurse                            Practitioner      Interpreting             Referring Physician     Rey Vidales   Fellow     Type of Study      TTE procedure:2D Echocardiogram, M-Mode, Doppler, Color Doppler, Contrast   study.     Procedure Date  Date: 09/26/2019 Start: 07:51 AM    Study Location: [facility]  Technical Quality: Limited visualization due to lung interference.    Indications:Premature ventricular contraction and Coronary artery  results for input(s): \"BNP\" in the last 72 hours.  PT/INR:   Recent Labs     12/22/24  1728 12/23/24  0609   PROTIME 14.8* 15.7*   INR 1.1 1.2     APTT:  Recent Labs     12/22/24  1728   APTT 31.3     CARDIAC ENZYMES:  Recent Labs     12/22/24  1836   TROPHS 23*     FASTING LIPID PANEL:  Lab Results   Component Value Date/Time    HDL 53 10/16/2023 12:00 AM    TRIG 115 07/25/2022 07:10 AM     LIVER PROFILE:  Recent Labs     12/24/24  0553 12/25/24  0705   * 98*   * 191*     Seen by Dr. Vidales on 9/24:  1. Known severe coronary artery disease status post remote coronary artery bypass.      Had Chest pain in 5/2022 to have high-grade InStent stenosis in the left circumflex artery that was successfully stented.    Diffuse coronary artery disease the LAD was occluded proximally with distal disease distal to the graft.    SVG to the posterior descending patent with moderate insertion stenosis and occluded right coronary artery  Were going to stop his Brilinta and continue with aspirin  Because of episodes of dizziness and low blood pressure will stop isosorbide     2. Persistent dry cough cause unclear.    Suggest to stop Brilinta  Patient denies any significant chest pain or shortness of breath .     3. Severe dyslipidemia   Now on Crestor 20mg last LDL of 30  Renewed his prescription today     4. . Hypertension blood pressure been under good control .       5. Mild dementia overall improving      IMPRESSION:      Preop prior to cholecystectomy  Known h/o CAD, CABG, PCI (last PCI on 5/22 to ISR of LCX, patent SVG to LAD, with distal LAD disease, patent SVG to PDA, with moderate insertional stenosis)- stable without angina  HTN  DL  Mild dementia  Obstructive jaundice, choledocholithiasis, status post ERCP on 12/23 with sphincterotomy and stent placement    RECOMMENDATIONS:  Check ECG  Check 2D echo inpatient if surgery is planned inpatient  If no inpatient surgery is planned, can do echo as outpatient

## 2024-12-25 NOTE — PROGRESS NOTES
AVS reviewed with patient. All questions answered. IV removed no complications. Personal belongings returned to patient/wife. Personal transportation arrived.

## 2024-12-25 NOTE — PROGRESS NOTES
[facility]  Family Medicine        Progress Note      Date:   12/25/2024  Patient name:  Doe Kapadia  Date of admission:  12/22/2024  5:10 PM  MRN:   [id number]  YOB: 1940        Brief HPI    Choledocholithiasis s/p ERCP    ASSESSMENT/PLAN     Hospital Problems             Last Modified POA    * (Principal) Choledocholithiasis 12/22/2024 Yes    Preoperative cardiovascular examination 12/25/2024 Yes    Coronary artery disease involving coronary bypass graft of native heart without angina pectoris 12/25/2024 Yes    Essential hypertension 12/25/2024 Yes    Type 2 diabetes mellitus without complication, with long-term current use of insulin (HCC) 12/25/2024 Yes    Jaundice 12/23/2024 Yes         Family is deciding on inpatient vs outpatient cholecystectomy. Seem to be leaning toward outpatient.  Continue to trend LFTs and advance diet as tolerated  Resume asa, brillinta. Hold antihypertensives.  Give 500cc fluid bolus for low BP, monitor fluid status closely 2/2 heart failure history  Consult to cardio for clearance  Creatinine is normal, however pt has reduced functional status, he is a moderate risk for surgery but it is acceptable to proceed with cholecystectomy. Cardio is consulted and clearance pending  Hold oral antihyperglycemic meds, monitor glucose for now      DVT:Lovenox  Code Status: Full Code   ADULT DIET; Full Liquid         SUBJECTIVE:     Patient was seen and examined at bedside. No acute events overnight. Patient denied any CP, SOB, fever, chills, nausea, vomiting or diarrhea. Tolerating clear diet. Patient denied any new complaints or concerns.  Consultant notes, labs & imaging reviewed. Case was discussed with nursing staff. Case discussed with patients family.       Review of Systems   Constitutional:  Negative for fever.   Respiratory:  Negative for shortness of breath.    Cardiovascular:  Negative for chest pain.   Gastrointestinal:  Negative for abdominal  APTT 31.3 12/22/2024 05:28 PM           Shayne Haney MD   12/25/2024 10:10 AM

## 2024-12-26 ENCOUNTER — TELEPHONE (OUTPATIENT)
Dept: GASTROENTEROLOGY | Age: 84
End: 2024-12-26

## 2024-12-26 ENCOUNTER — CARE COORDINATION (OUTPATIENT)
Dept: CARE COORDINATION | Age: 84
End: 2024-12-26

## 2024-12-26 DIAGNOSIS — R17 JAUNDICE: Primary | ICD-10-CM

## 2024-12-26 LAB
EKG Q-T INTERVAL: 376 MS
EKG QRS DURATION: 118 MS
EKG QTC CALCULATION (BAZETT): 462 MS
EKG R AXIS: -29 DEGREES
EKG T AXIS: 5 DEGREES
EKG VENTRICULAR RATE: 91 BPM

## 2024-12-26 PROCEDURE — 1111F DSCHRG MED/CURRENT MED MERGE: CPT | Performed by: FAMILY MEDICINE

## 2024-12-26 PROCEDURE — 93010 ELECTROCARDIOGRAM REPORT: CPT | Performed by: INTERNAL MEDICINE

## 2024-12-26 NOTE — TELEPHONE ENCOUNTER
Called & left patient a voicemail message.  Asked him to call back to get follow up appointment scheduled in 3-4 weeks.      Sia Vo [facility] Gi Front Staff         Previous Messages       ----- Message -----  From: Berto Mendoza APRN - DAGOBERTO  Sent: 12/25/2024  11:19 AM EST  To: Sia Vo  Subject: Follow-up                                        Patient will need to follow-up in office with Dr. Parker in 3 to 4 weeks and then to be scheduled for repeat ERCP for stent removal in 6-8  Discharged today

## 2024-12-26 NOTE — CARE COORDINATION
Care Transitions Note    Initial Call - Call within 2 business days of discharge: Yes    Patient Current Location:  Home: [address] [place] [address] [place] [address]    Care Transition Nurse contacted the spouse/partner  by telephone to perform post hospital discharge assessment, verified name and  as identifiers. Provided introduction to self, and explanation of the Care Transition Nurse role.     Patient: Doe Kapadia    Patient : 1940   MRN: [id number]    Reason for Admission: Jaundice, choledocholithiasis  Discharge Date: 24  RURS: Readmission Risk Score: 14.6      Last Discharge Facility       Date Complaint Diagnosis Description Type Department Provider    24 Generalized Body Aches; Jaundice; Dizziness Jaundice ... ED to Hosp-Admission (Discharged) (ADMITTED) [facility] Shayne Haney MD; POPEYE Goodwin.            Was this an external facility discharge? No    Additional needs identified to be addressed with provider   No needs identified             Method of communication with provider: none.    Patients top risk factors for readmission: medical condition-choledocholithiasis    Interventions to address risk factors:   Education: Medications as ordered, monitor blood pressures, hold blood pressure meds if b/p <  110/60, drink fluids, follow up with cardiology, GI, surgeon and PCP.    Care Summary Note: Spoke with patient's wife, Corine today for initial 24 hour follow up call. Patient came in to the hospital after his daughter came in to town for the holiday and noticed he was looking yellow.  Patient came in to the ED, was not having pain and was found to have choledocholithiasis.  He had ERCP with GI who placed stent, this is to be removed in about 8 weeks.  Plans are for him to have cholecystectomy in the future, needs cardiology follow up for clearance and repeat echo done.  Today, his wife said he is doing ok, he is not complaining of any pain, his skin coloring no longer looking

## 2024-12-27 ENCOUNTER — CARE COORDINATION (OUTPATIENT)
Dept: CARE COORDINATION | Age: 84
End: 2024-12-27

## 2024-12-27 NOTE — CARE COORDINATION
Per CTN request, writer called GI office,spoke to Mirna and schedule f/u appointment for:   GI/Dr. Gurrola  Friday Feb. 7th  @ 10:45am   [address] [place], [place]  ph# [phone]    Writer called Cardiology / Dr. Vidales office, spoke to Keren. Patient currently has appt for:  Tues March 25th @ 1pm - If patient needs cardiac clearance for surgery sooner, Gen Surg office needs to fax Request for surgery clearance to the Cardiology office @ fax # [phone].     Writer called General Surg/  Thursday for f/u appt, spoke to Mckayla:   Gen Surg/ Dr. Bailey  Thursday, Jan. 16th @ 0930am  [address], [place]   ph# [phone]    Writer called patient and spoke to wife Corine.  A 3 way was conducted to schedule [facility] for:   Tues. Jan. 7th @ 2:30 / orders in Epic - take insur cards and ID.     Provided patients wife (Alpesh) with D/T/and locations and phone numbers of all appointments and she wrote them down and verbally understands. Patients wife also understands Cardiology appointment may be move up sooner depending on what Dr. Bailey recommends for GB surgery.    Will advise CTN  CC f/u 1.2.25

## 2024-12-30 ENCOUNTER — HOSPITAL ENCOUNTER (OUTPATIENT)
Age: 84
Setting detail: SPECIMEN
Discharge: HOME | End: 2024-12-30

## 2024-12-30 DIAGNOSIS — K80.50 CHOLEDOCHOLITHIASIS: ICD-10-CM

## 2024-12-30 LAB
ALBUMIN SERPL-MCNC: 3.5 G/DL (ref 3.5–5.2)
ALBUMIN/GLOB SERPL: 1 {RATIO} (ref 1–2.5)
ALP SERPL-CCNC: 729 U/L (ref 40–129)
ALT SERPL-CCNC: 133 U/L (ref 10–50)
ANION GAP SERPL CALCULATED.3IONS-SCNC: 18 MMOL/L (ref 9–16)
AST SERPL-CCNC: 79 U/L (ref 10–50)
BILIRUB DIRECT SERPL-MCNC: 2.9 MG/DL (ref 0–0.2)
BILIRUB INDIRECT SERPL-MCNC: 1.1 MG/DL (ref 0–1)
BILIRUB SERPL-MCNC: 4 MG/DL (ref 0–1.2)
BUN SERPL-MCNC: 12 MG/DL (ref 8–23)
CALCIUM SERPL-MCNC: 9.6 MG/DL (ref 8.6–10.4)
CHLORIDE SERPL-SCNC: 95 MMOL/L (ref 98–107)
CO2 SERPL-SCNC: 20 MMOL/L (ref 20–31)
CREAT SERPL-MCNC: 1.4 MG/DL (ref 0.7–1.2)
GFR, ESTIMATED: 50 ML/MIN/1.73M2
GLUCOSE SERPL-MCNC: 322 MG/DL (ref 74–99)
POTASSIUM SERPL-SCNC: 4.2 MMOL/L (ref 3.7–5.3)
PROT SERPL-MCNC: 7 G/DL (ref 6.6–8.7)
SODIUM SERPL-SCNC: 133 MMOL/L (ref 136–145)

## 2025-01-02 ENCOUNTER — CARE COORDINATION (OUTPATIENT)
Dept: CARE COORDINATION | Age: 85
End: 2025-01-02

## 2025-01-02 NOTE — CARE COORDINATION
Care Transitions Note    Follow Up Call     Patient Current Location:  Home: [address]  [place] [address]    LPN Care Coordinator contacted the spouse/partner  by telephone. Verified name and  as identifiers.    Additional needs identified to be addressed with provider   Per wife-he continues to have a dry cough-states he had this during his hospital follow up but lungs were clear. Is there anything he can take for this? She would also like t know if he should resume his Losartan and Metoprolol?  He has been holding both du to low BP in the hospital. I advised to check his BP due to there being parameters in place for Metorolol-she states she will try to check today.                  Method of communication with provider: chart routing.    Care Summary Note: Writer spoke with patient's wife Corine for a follow up care transitions call. She states Yo is doing okay. He is not having any abdominal pain or jaundice-no n/v/d or fever/chills. She states he continues to have a dry hacking cough with no SOB. She also states they are still holding his Losartan and Metoprolol and would like to know when he should resume these. They ave not checked his BP recently-advised to check BP due to Metoprolol having dosing parameters. Will route message to PCP regarding cough and Losartan. Corine denies having any other questions or concerns at this time.     Plan of care updates since last contact:  Review of patient management of conditions/medications:         Advance Care Planning:   Does patient have an Advance Directive: reviewed during previous call, see note. .    Medication Review:  No changes since last call.     Remote Patient Monitoring:  Offered patient enrollment in the Remote Patient Monitoring (RPM) program for in-home monitoring: Patient is not eligible for RPM program because: patient does not have qualifying diagnosis.    Assessments:  Care Transitions Subsequent and Final Call    Subsequent and Final

## 2025-01-03 ENCOUNTER — CARE COORDINATION (OUTPATIENT)
Dept: CARE COORDINATION | Age: 85
End: 2025-01-03

## 2025-01-03 NOTE — CARE COORDINATION
Writer received response from PCP regarding cough and BP meds.     \"He should take his BP medications as directed on his discharge paperwork and his Losartan has parameters as well. I recommend tessalon perles for his cough but these are prescription only. Please let them know and I can order these if they would like. Thank you. \"    Writer called and spoke with patient's wife-she requests Tessalon be sent to [facility] on [address]. Discussed BP parameters for Losartan and Metoprolol-she will start checking BP in the am and will dose BP meds accordingly. Writer notified PCP who will send Tessalon script.

## 2025-01-07 ENCOUNTER — HOSPITAL ENCOUNTER (OUTPATIENT)
Age: 85
Discharge: HOME | End: 2025-01-09
Attending: INTERNAL MEDICINE
Payer: MEDICARE

## 2025-01-07 VITALS
HEIGHT: 67 IN | WEIGHT: 156 LBS | SYSTOLIC BLOOD PRESSURE: 100 MMHG | BODY MASS INDEX: 24.48 KG/M2 | HEART RATE: 131 BPM | DIASTOLIC BLOOD PRESSURE: 78 MMHG

## 2025-01-07 DIAGNOSIS — I25.810 CORONARY ARTERY DISEASE INVOLVING CORONARY BYPASS GRAFT OF NATIVE HEART WITHOUT ANGINA PECTORIS: ICD-10-CM

## 2025-01-07 LAB
ECHO AO ROOT DIAM: 3.3 CM
ECHO AO ROOT INDEX: 1.81 CM/M2
ECHO AR MAX VEL PISA: 3.5 M/S
ECHO AV AREA PEAK VELOCITY: 2.3 CM2
ECHO AV AREA VTI: 3.2 CM2
ECHO AV AREA/BSA PEAK VELOCITY: 1.3 CM2/M2
ECHO AV AREA/BSA VTI: 1.8 CM2/M2
ECHO AV MEAN GRADIENT: 4 MMHG
ECHO AV MEAN VELOCITY: 0.9 M/S
ECHO AV PEAK GRADIENT: 6 MMHG
ECHO AV PEAK VELOCITY: 1.2 M/S
ECHO AV REGURGITANT PHT: 265 MS
ECHO AV VELOCITY RATIO: 0.67
ECHO AV VTI: 23.5 CM
ECHO BSA: 1.83 M2
ECHO LA AREA 2C: 10.3 CM2
ECHO LA AREA 4C: 10.9 CM2
ECHO LA DIAMETER INDEX: 1.98 CM/M2
ECHO LA DIAMETER: 3.6 CM
ECHO LA MAJOR AXIS: 4.2 CM
ECHO LA MINOR AXIS: 4.4 CM
ECHO LA TO AORTIC ROOT RATIO: 1.09
ECHO LA VOL BP: 22 ML (ref 18–58)
ECHO LA VOL MOD A2C: 20 ML (ref 18–58)
ECHO LA VOL MOD A4C: 23 ML (ref 18–58)
ECHO LA VOL/BSA BIPLANE: 12 ML/M2 (ref 16–34)
ECHO LA VOLUME INDEX MOD A2C: 11 ML/M2 (ref 16–34)
ECHO LA VOLUME INDEX MOD A4C: 13 ML/M2 (ref 16–34)
ECHO LV E' LATERAL VELOCITY: 11.2 CM/S
ECHO LV E' SEPTAL VELOCITY: 12.3 CM/S
ECHO LV EDV A2C: 114 ML
ECHO LV EDV A4C: 140 ML
ECHO LV EDV INDEX A4C: 77 ML/M2
ECHO LV EDV NDEX A2C: 63 ML/M2
ECHO LV EJECTION FRACTION A2C: 31 %
ECHO LV EJECTION FRACTION A4C: 32 %
ECHO LV EJECTION FRACTION BIPLANE: 33 % (ref 55–100)
ECHO LV ESV A2C: 79 ML
ECHO LV ESV A4C: 95 ML
ECHO LV ESV INDEX A2C: 43 ML/M2
ECHO LV ESV INDEX A4C: 52 ML/M2
ECHO LV FRACTIONAL SHORTENING: 11 % (ref 28–44)
ECHO LV INTERNAL DIMENSION DIASTOLE INDEX: 2.97 CM/M2
ECHO LV INTERNAL DIMENSION DIASTOLIC: 5.4 CM (ref 4.2–5.9)
ECHO LV INTERNAL DIMENSION SYSTOLIC INDEX: 2.64 CM/M2
ECHO LV INTERNAL DIMENSION SYSTOLIC: 4.8 CM
ECHO LV IVSD: 1.1 CM (ref 0.6–1)
ECHO LV MASS 2D: 249.4 G (ref 88–224)
ECHO LV MASS INDEX 2D: 137 G/M2 (ref 49–115)
ECHO LV POSTERIOR WALL DIASTOLIC: 1.2 CM (ref 0.6–1)
ECHO LV RELATIVE WALL THICKNESS RATIO: 0.44
ECHO LVOT AREA: 3.5 CM2
ECHO LVOT AV VTI INDEX: 0.91
ECHO LVOT DIAM: 2.1 CM
ECHO LVOT MEAN GRADIENT: 2 MMHG
ECHO LVOT PEAK GRADIENT: 3 MMHG
ECHO LVOT PEAK VELOCITY: 0.8 M/S
ECHO LVOT STROKE VOLUME INDEX: 40.9 ML/M2
ECHO LVOT SV: 74.4 ML
ECHO LVOT VTI: 21.5 CM
ECHO MV E DECELERATION TIME (DT): 156 MS
ECHO MV E VELOCITY: 1.09 M/S
ECHO MV E/E' LATERAL: 9.73
ECHO MV E/E' RATIO (AVERAGED): 9.3
ECHO MV E/E' SEPTAL: 8.86
ECHO RA AREA 4C: 10.6 CM2
ECHO RA END SYSTOLIC VOLUME APICAL 4 CHAMBER INDEX BSA: 12 ML/M2
ECHO RA VOLUME: 21 ML
ECHO RV TAPSE: 1 CM (ref 1.7–?)

## 2025-01-07 PROCEDURE — 93306 TTE W/DOPPLER COMPLETE: CPT | Performed by: INTERNAL MEDICINE

## 2025-01-07 PROCEDURE — 6360000004 HC RX CONTRAST MEDICATION: Performed by: INTERNAL MEDICINE

## 2025-01-07 PROCEDURE — C8929 TTE W OR WO FOL WCON,DOPPLER: HCPCS

## 2025-01-07 RX ADMIN — PERFLUTREN 2 ML: 6.52 INJECTION, SUSPENSION INTRAVENOUS at 16:40

## 2025-01-09 ENCOUNTER — CARE COORDINATION (OUTPATIENT)
Dept: CARE COORDINATION | Age: 85
End: 2025-01-09

## 2025-01-09 NOTE — CARE COORDINATION
Care Transitions Note    Follow Up Call     Patient Current Location:  Home: [address] [place] [address]    [facility] Care Coordinator contacted the patient by telephone. Verified name and  as identifiers.    Additional needs identified to be addressed with provider   No needs identified                 Method of communication with provider: none.    Care Summary Note: Writer spoke with patient's wife Corine for a follow up care transitions call. She states that Yo is doing okay-the Tessalon is helping with his cough-she states he has been able to sleep through the night without it waking him up. He is not having any abdominal pain,n/v/or diarrhea and is not having any jaundice. He is scheduled to see surgery on 2025. Corine denies having any new needs or concerns and thanked writer for calling.     Plan of care updates since last contact:  Review of patient management of conditions/medications:         Advance Care Planning:   Does patient have an Advance Directive: reviewed during previous call, see note. .    Medication Review:  No changes since last call.     Remote Patient Monitoring:  Offered patient enrollment in the Remote Patient Monitoring (RPM) program for in-home monitoring: Patient is not eligible for RPM program because: patient does not have qualifying diagnosis.    Assessments:  Care Transitions Subsequent and Final Call    Subsequent and Final Calls  Care Transitions Interventions  Other Interventions:              Follow Up Appointment:   Reviewed upcoming appointment(s). and BAUTISTA appointment attended as scheduled   Future Appointments         Provider Specialty Dept Phone    2025 9:30 AM Zenia Gale APRN - [facility] General Surgery [phone]    2025 3:00 PM Zulma Hill MD Family Medicine [phone]    2025 10:45 AM Santy Gurrola MD Gastroenterology [phone]            [facility] Care Coordinator provided contact information.  Plan for follow-up call in 6-10 days based on

## 2025-01-16 ENCOUNTER — HOSPITAL ENCOUNTER (OUTPATIENT)
Age: 85
Setting detail: SPECIMEN
Discharge: HOME | End: 2025-01-16

## 2025-01-16 ENCOUNTER — OFFICE VISIT (OUTPATIENT)
Age: 85
End: 2025-01-16
Payer: MEDICARE

## 2025-01-16 VITALS
TEMPERATURE: 97 F | HEART RATE: 118 BPM | DIASTOLIC BLOOD PRESSURE: 63 MMHG | HEIGHT: 67 IN | OXYGEN SATURATION: 93 % | BODY MASS INDEX: 22.54 KG/M2 | WEIGHT: 143.6 LBS | SYSTOLIC BLOOD PRESSURE: 114 MMHG

## 2025-01-16 DIAGNOSIS — K80.50 CHOLEDOCHOLITHIASIS: Primary | ICD-10-CM

## 2025-01-16 DIAGNOSIS — I25.810 CORONARY ARTERY DISEASE INVOLVING CORONARY BYPASS GRAFT OF NATIVE HEART WITHOUT ANGINA PECTORIS: ICD-10-CM

## 2025-01-16 DIAGNOSIS — I50.20 HEART FAILURE WITH REDUCED EJECTION FRACTION (HCC): ICD-10-CM

## 2025-01-16 DIAGNOSIS — R17 JAUNDICE: ICD-10-CM

## 2025-01-16 DIAGNOSIS — R74.8 ELEVATED LIVER ENZYMES: ICD-10-CM

## 2025-01-16 DIAGNOSIS — K80.50 CHOLEDOCHOLITHIASIS: ICD-10-CM

## 2025-01-16 LAB
ALBUMIN SERPL-MCNC: 3.6 G/DL (ref 3.5–5.2)
ALBUMIN/GLOB SERPL: 0.9 {RATIO} (ref 1–2.5)
ALP SERPL-CCNC: 217 U/L (ref 40–129)
ALT SERPL-CCNC: 90 U/L (ref 10–50)
AST SERPL-CCNC: 56 U/L (ref 10–50)
BILIRUB DIRECT SERPL-MCNC: 1.5 MG/DL (ref 0–0.2)
BILIRUB INDIRECT SERPL-MCNC: 0.7 MG/DL (ref 0–1)
BILIRUB SERPL-MCNC: 2.2 MG/DL (ref 0–1.2)
GLOBULIN SER CALC-MCNC: 3.9 G/DL
PROT SERPL-MCNC: 7.5 G/DL (ref 6.6–8.7)

## 2025-01-16 PROCEDURE — 3074F SYST BP LT 130 MM HG: CPT | Performed by: NURSE PRACTITIONER

## 2025-01-16 PROCEDURE — 1123F ACP DISCUSS/DSCN MKR DOCD: CPT | Performed by: NURSE PRACTITIONER

## 2025-01-16 PROCEDURE — 1126F AMNT PAIN NOTED NONE PRSNT: CPT | Performed by: NURSE PRACTITIONER

## 2025-01-16 PROCEDURE — 99203 OFFICE O/P NEW LOW 30 MIN: CPT | Performed by: NURSE PRACTITIONER

## 2025-01-16 PROCEDURE — 3078F DIAST BP <80 MM HG: CPT | Performed by: NURSE PRACTITIONER

## 2025-01-16 ASSESSMENT — ENCOUNTER SYMPTOMS
RHINORRHEA: 0
COUGH: 0
SORE THROAT: 0
SHORTNESS OF BREATH: 0

## 2025-01-16 NOTE — PROGRESS NOTES
recognition software.  Quite often unanticipated grammatical, syntax, homophones, and other interpretive errors are inadvertently transcribed by the computer software.  Please disregard these errors and any other errors that may have escaped final proofreading.     Electronically signed by HAYLEE Roy CNP  on 1/19/2025 at 8:20 AM

## 2025-01-17 ENCOUNTER — CARE COORDINATION (OUTPATIENT)
Dept: CARE COORDINATION | Age: 85
End: 2025-01-17

## 2025-01-17 NOTE — CARE COORDINATION
Care Transitions Note    Follow Up Call     Patient Current Location:  Home: [address]  [place] [address]    LPN Care Coordinator contacted the patient by telephone. Verified name and  as identifiers.    Additional needs identified to be addressed with provider   No needs identified                 Method of communication with provider: none.    Care Summary Note: Writer spoke with patient's wife Corine for a follow up care transitions call. She states he is doing okay. He is not having any jaundice, no abdominal pain or n/v/d. She states he is drinking well but not eating much-she is offering and encouraging Ensure throughout the day. His cough is still present but not as bad as before. He had his surgery follow up and a gallbladder US and NM hepatobiliary scan were ordered-wife was given number to call to get these appts scheduled. He will follow up with [facility] after completion.  Corine was very appreciative of today's call and denied having any other needs or concerns.     Plan of care updates since last contact:  Review of patient management of conditions/medications:         Advance Care Planning:   Does patient have an Advance Directive: reviewed during previous call, see note. .    Medication Review:  No changes since last call.     Remote Patient Monitoring:  Offered patient enrollment in the Remote Patient Monitoring (RPM) program for in-home monitoring: Patient is not eligible for RPM program because: patient does not have qualifying diagnosis.    Assessments:  Care Transitions Subsequent and Final Call    Subsequent and Final Calls  Do you have any ongoing symptoms?: No  Have your medications changed?: No  Do you have any questions related to your medications?: No  Do you currently have any active services?: No  Do you have any needs or concerns that I can assist you with?: No  Identified Barriers: None  Care Transitions Interventions  Other Interventions:              Follow Up Appointment:

## 2025-01-24 ENCOUNTER — CARE COORDINATION (OUTPATIENT)
Dept: CARE COORDINATION | Age: 85
End: 2025-01-24

## 2025-01-24 PROBLEM — Z01.810 PREOPERATIVE CARDIOVASCULAR EXAMINATION: Status: RESOLVED | Noted: 2024-12-25 | Resolved: 2025-01-24

## 2025-01-24 NOTE — CARE COORDINATION
Care Transitions Note    Final Call     Patient Current Location:  Home: [address] [place] [address]    N Care Coordinator contacted the spouse/partner  by telephone. Verified name and  as identifiers.    Patient graduated from the Care Transitions program on 25.  Patient/family verbalizes confidence in the ability to self-manage at this time..      Advance Care Planning:   Does patient have an Advance Directive: reviewed during previous call, see note. .    Handoff:   Patient was not referred to the ACM team due to no additional needs identified.       Care Summary Note: Writer spoke to patient's spouse Corine for follow up transitional care call. She stated that he is doing much better, is ambulating and eating more. The cough medication that was prescribed has really helped him. She states follow up appointments have been made. She voice no needs or concerns is is okay with ending transitional care calls. She will reach out to patient's provider should she have needs or concerns.     Assessments:  Care Transitions Subsequent and Final Call    Subsequent and Final Calls  Care Transitions Interventions  Other Interventions:              Upcoming Appointments:    Future Appointments         Provider Specialty Dept Phone    2025 9:00 AM (Arrive by 8:30 AM) [facility] US XD CLEAR RM Radiology [phone]    2025 9:30 AM (Arrive by 9:15 AM) [facility] NM SPECT/CT Radiology [phone]    2025 3:00 PM Zulma Hill MD Family Medicine [phone]    2025 10:45 AM Santy Gurrola MD Gastroenterology [phone]    2025 10:00 AM Boubacar Bailey MD General Surgery [phone]            Patient has agreed to contact primary care provider and/or specialist for any further questions, concerns, or needs.    Irasema Luna LPN

## 2025-02-03 PROBLEM — I25.5 ISCHEMIC CARDIOMYOPATHY: Status: ACTIVE | Noted: 2025-01-15

## 2025-02-03 PROBLEM — R09.89 CAROTID BRUIT: Status: ACTIVE | Noted: 2022-01-10

## 2025-02-03 PROBLEM — E03.9 ACQUIRED HYPOTHYROIDISM: Status: ACTIVE | Noted: 2024-01-22

## 2025-02-05 ENCOUNTER — HOSPITAL ENCOUNTER (OUTPATIENT)
Dept: ULTRASOUND IMAGING | Age: 85
Discharge: HOME | End: 2025-02-07
Payer: MEDICARE

## 2025-02-05 ENCOUNTER — HOSPITAL ENCOUNTER (OUTPATIENT)
Dept: NUCLEAR MEDICINE | Age: 85
Discharge: HOME | End: 2025-02-07
Payer: MEDICARE

## 2025-02-05 ENCOUNTER — TELEPHONE (OUTPATIENT)
Age: 85
End: 2025-02-05

## 2025-02-05 ENCOUNTER — HOSPITAL ENCOUNTER (OUTPATIENT)
Age: 85
Setting detail: SPECIMEN
Discharge: HOME | End: 2025-02-05

## 2025-02-05 DIAGNOSIS — K80.50 CHOLEDOCHOLITHIASIS: ICD-10-CM

## 2025-02-05 DIAGNOSIS — E11.8 TYPE 2 DIABETES MELLITUS WITH COMPLICATION, WITHOUT LONG-TERM CURRENT USE OF INSULIN (HCC): ICD-10-CM

## 2025-02-05 DIAGNOSIS — R17 JAUNDICE: ICD-10-CM

## 2025-02-05 DIAGNOSIS — E78.2 MIXED HYPERLIPIDEMIA: ICD-10-CM

## 2025-02-05 DIAGNOSIS — R74.8 ELEVATED LIVER ENZYMES: ICD-10-CM

## 2025-02-05 PROCEDURE — A9537 TC99M MEBROFENIN: HCPCS | Performed by: NURSE PRACTITIONER

## 2025-02-05 PROCEDURE — 78226 HEPATOBILIARY SYSTEM IMAGING: CPT

## 2025-02-05 PROCEDURE — 78227 HEPATOBIL SYST IMAGE W/DRUG: CPT

## 2025-02-05 PROCEDURE — 3430000000 HC RX DIAGNOSTIC RADIOPHARMACEUTICAL: Performed by: NURSE PRACTITIONER

## 2025-02-05 PROCEDURE — 76705 ECHO EXAM OF ABDOMEN: CPT

## 2025-02-05 RX ADMIN — Medication 3.7 MILLICURIE: at 10:30

## 2025-02-05 NOTE — TELEPHONE ENCOUNTER
Martin loyd to contact the patient and find out how he is feeling, called and spoke to leif hong wife she states he is feeling fine no abdominal pain no nausea or vomiting no fever or chills no issues with bowels or urine. I informed her that we will call and follow up tomorrow, she states they are seeing GI on Friday and then still plan to see us as scheduled on 2/26

## 2025-02-06 ENCOUNTER — TELEPHONE (OUTPATIENT)
Dept: SURGERY | Age: 85
End: 2025-02-06

## 2025-02-06 DIAGNOSIS — R74.8 ELEVATED LIVER ENZYMES: ICD-10-CM

## 2025-02-06 DIAGNOSIS — R17 JAUNDICE: ICD-10-CM

## 2025-02-06 DIAGNOSIS — K80.50 CHOLEDOCHOLITHIASIS: Primary | ICD-10-CM

## 2025-02-06 LAB
CHOLEST SERPL-MCNC: 186 MG/DL (ref 0–199)
CHOLESTEROL/HDL RATIO: 3.7
CREAT UR-MCNC: 92.4 MG/DL (ref 39–259)
HDLC SERPL-MCNC: 50 MG/DL
LDLC SERPL CALC-MCNC: 82 MG/DL (ref 0–100)
MICROALBUMIN UR-MCNC: 35 MG/L (ref 0–20)
MICROALBUMIN/CREAT UR-RTO: 38 MCG/MG CREAT (ref 0–17)
TRIGL SERPL-MCNC: 272 MG/DL
VLDLC SERPL CALC-MCNC: 54 MG/DL (ref 1–30)

## 2025-02-06 NOTE — TELEPHONE ENCOUNTER
Sending this to you as an FYI. I had discussed this patient with you previously. He has a f/u scheduled with you 2/26 and also with GI on 2/7. Let me know if you would like to see him sooner for an appointment.  He completed GBUS and HIDA scan, results copied below:    IMPRESSION:  1. Limited exam.  2. Biliary stent is present. The common bile duct measures approximately 8 mm.  3. Unable to visualize the gallbladder.    IMPRESSION:  1.  Activity is seen within the small bowel.  A biliary stent is present on  the concurrent ultrasound.     2.  The gallbladder is not visualized by 1 hour.  Presence of a biliary stent  can preclude visualization of the gallbladder due to preferential flow  through the stent and the examination is nondiagnostic for cystic duct  obstruction.     3.  Delayed clearance of blood pool activity suggests underlying hepatic  dysfunction.

## 2025-02-06 NOTE — TELEPHONE ENCOUNTER
Case discussed with Zenia Gale CNP.  Patient was seen by her in December in my absence.  Imaging studies reviewed.  Blood work done recently reviewed.  Liver enzymes are essentially normal at this time.  WBC count normal.    Patient has an appointment to see GI physician tomorrow.  Given his cardiac history patient will need cardiac clearance for cholecystectomy.  Will discuss with GI physician after his appointment, and if plans to proceed with surgery then will obtain cardiac clearance and then proceed accordingly.  Patient's wife was updated    Patient may need MRI MRCP to further evaluate the gallbladder based on the imaging studies and the results are not available.  Will make that decision after conversation with GI physician.    Upon further review of the chart cardiology has seen the patient and has cleared him with moderate to high risk for surgery.

## 2025-02-07 ENCOUNTER — PREP FOR PROCEDURE (OUTPATIENT)
Dept: GASTROENTEROLOGY | Age: 85
End: 2025-02-07

## 2025-02-07 ENCOUNTER — TELEPHONE (OUTPATIENT)
Dept: GASTROENTEROLOGY | Age: 85
End: 2025-02-07

## 2025-02-07 ENCOUNTER — OFFICE VISIT (OUTPATIENT)
Dept: GASTROENTEROLOGY | Age: 85
End: 2025-02-07
Payer: MEDICARE

## 2025-02-07 VITALS
SYSTOLIC BLOOD PRESSURE: 96 MMHG | BODY MASS INDEX: 23.23 KG/M2 | HEIGHT: 67 IN | HEART RATE: 101 BPM | DIASTOLIC BLOOD PRESSURE: 67 MMHG | WEIGHT: 148 LBS

## 2025-02-07 DIAGNOSIS — K80.50 CHOLEDOCHOLITHIASIS: Primary | ICD-10-CM

## 2025-02-07 DIAGNOSIS — K80.50 CHOLEDOCHOLITHIASIS: ICD-10-CM

## 2025-02-07 PROCEDURE — 3078F DIAST BP <80 MM HG: CPT | Performed by: INTERNAL MEDICINE

## 2025-02-07 PROCEDURE — 3074F SYST BP LT 130 MM HG: CPT | Performed by: INTERNAL MEDICINE

## 2025-02-07 PROCEDURE — 99214 OFFICE O/P EST MOD 30 MIN: CPT | Performed by: INTERNAL MEDICINE

## 2025-02-07 PROCEDURE — 1123F ACP DISCUSS/DSCN MKR DOCD: CPT | Performed by: INTERNAL MEDICINE

## 2025-02-07 PROCEDURE — 1126F AMNT PAIN NOTED NONE PRSNT: CPT | Performed by: INTERNAL MEDICINE

## 2025-02-07 PROCEDURE — 1159F MED LIST DOCD IN RCRD: CPT | Performed by: INTERNAL MEDICINE

## 2025-02-07 NOTE — TELEPHONE ENCOUNTER
Dr. Gurrola's note from today is completed.    \"Plan- Will schedule for ERCP for stent removal after his lap sunny. Will discuss with Dr. Bailey regarding the timing for his lap sunny\"

## 2025-02-07 NOTE — PROGRESS NOTES
GI CLINIC FOLLOW UP    INTERVAL HISTORY:   No referring provider defined for this encounter.    Chief Complaint   Patient presents with    Follow-up     4 WEEK FOLLOW UP D/C 12/25/2024             HISTORY OF PRESENT ILLNESS: Mr.Rajendra SAMANTHA Kapadia is a 85 y.o. male , referred for evaluation of choledocholithiasis.    He was admitted at [facility] 2 months ago with abdominal pain with abnormal LFT's.  His abdominal CT-scan showed- bile duct stones for which he had ERCP, removal of stones in CBD and stenting  He is planned for lap sunny.    No c/o- nausea, vomiting, fever, loss of appetite, weight loss, bloating, bleeding AK, diarrhea, nocturnal diarrhea, tenesmus      Past Medical,Family, and Social History reviewed and does not contribute to the patient presentingcondition.    I did review all the labs results available for the labs which were ordered by the primary care physician, and the other consultants, we search on [employer] at [facility] and all the available care everywhere epic    I did review all the imaging studies of the abdomen available on EMR, ordered by the primary care physician and the other consultant    I did review all the pathology from the biopsies done on the previous endoscopies    Patient's PMH/PSH,SH,PSYCH Hx, MEDs, ALLERGIES, and ROS were all reviewed and updated in the appropriate sections.    PAST MEDICAL HISTORY:  Past Medical History:   Diagnosis Date    Arthritis     CAD (coronary artery disease)     CHF (congestive heart failure) (HCC)     Diabetes mellitus (HCC)     History of blood transfusion     Hyperlipidemia     Hypertension     PVC (premature ventricular contraction)     SOB (shortness of breath)     Thyroid disease        Past Surgical History:   Procedure Laterality Date    CARDIAC SURGERY      COLONOSCOPY  10/05/2011    normal    COLONOSCOPY  03/07/2006    small internal hemorrhoids, diverticulosis    COLONOSCOPY  11/14/2001    small internal hemorrhoids, hyperplastic polyp

## 2025-02-07 NOTE — TELEPHONE ENCOUNTER
Procedure scheduled/Dr Gurrola  Procedure:ERCP  Dx:  Choledocholithiasis   Date:04/11/2025  Time:1:00 pm / Arrive: 11:00 am  Hospital:[facility] phone call:mike  Bowel Prep instructions given:ERCP instructions  In office/via phone:office   Clearance needed:  GLP-1:

## 2025-02-11 NOTE — DISCHARGE SUMMARY
[facility]  Family Medicine      Discharge Summary      NAME:  Doe Kapadia  :  1940  MRN:  [id number]    Admit date:  2024  Discharge date:  2024    Admitting Physician:  Renee Worley MD    Primary Diagnosis on Admission:   Present on Admission:   Choledocholithiasis   Jaundice   Preoperative cardiovascular examination   Coronary artery disease involving coronary bypass graft of native heart without angina pectoris   Essential hypertension   Type 2 diabetes mellitus without complication, with long-term current use of insulin (HCC)      Secondary Diagnoses:  has Unstable angina (HCC); Preoperative cardiovascular examination; and Coronary artery disease involving coronary bypass graft of native heart without angina pectoris on their pertinent problem list.      Admission Condition:  poor     Discharged Condition: good      Hospital Course:   The patient was admitted for the management of jaundice weakness and fatigue.  Patient was found to have choledocholithiasis with bile duct dilatation and significant liver enzyme and bilirubin elevation.  Patient went for ERCP with stent placement to be removed in 8 weeks by GI.  Patient will need a cholecystectomy, deliberated inpatient versus outpatient however due to patient's low albumin would probably be better to wait a couple of weeks and do the cholecystectomy outpatient.  Patient advised to drink high-protein Ensure 3 times daily.  Will repeat CMP with bilirubin in 1 week.  Patient also has cardiac history with history of CHF, he will need cardiac clearance and outpatient echocardiogram, order was provided.  Patient will need to follow-up with his outpatient cardiologist Dr. Vidales.    The patient was seen and examined at bedside today.  His spouse was at bedside and all questions were answered.  There were no acute events overnight.  Patient tolerated full liquids.  BP was on the lower side, will get a small fluid bolus 200 cc.   Alert and oriented to person, place and time

## 2025-02-11 NOTE — TELEPHONE ENCOUNTER
Called and spoke to patients wife leif, she is aware of the order I gave her the phone number to scheduling she is going to call and schedule it I explained that if they can't get him in soon enough to call me back and let me know so I can try to help she stated understanding.

## 2025-02-22 ENCOUNTER — HOSPITAL ENCOUNTER (OUTPATIENT)
Dept: MRI IMAGING | Age: 85
Discharge: HOME | End: 2025-02-24
Payer: MEDICARE

## 2025-02-22 DIAGNOSIS — R74.8 ELEVATED LIVER ENZYMES: ICD-10-CM

## 2025-02-22 DIAGNOSIS — K80.50 CHOLEDOCHOLITHIASIS: ICD-10-CM

## 2025-02-22 DIAGNOSIS — R17 JAUNDICE: ICD-10-CM

## 2025-02-22 LAB
EGFR, POC: 67 ML/MIN/1.73M2
POC CREATININE: 1.1 MG/DL (ref 0.51–1.19)

## 2025-02-22 PROCEDURE — A9579 GAD-BASE MR CONTRAST NOS,1ML: HCPCS | Performed by: NURSE PRACTITIONER

## 2025-02-22 PROCEDURE — 76377 3D RENDER W/INTRP POSTPROCES: CPT

## 2025-02-22 PROCEDURE — 82565 ASSAY OF CREATININE: CPT

## 2025-02-22 PROCEDURE — 2580000003 HC RX 258: Performed by: NURSE PRACTITIONER

## 2025-02-22 PROCEDURE — 2500000003 HC RX 250 WO HCPCS: Performed by: NURSE PRACTITIONER

## 2025-02-22 PROCEDURE — 6360000004 HC RX CONTRAST MEDICATION: Performed by: NURSE PRACTITIONER

## 2025-02-22 RX ORDER — 0.9 % SODIUM CHLORIDE 0.9 %
50 INTRAVENOUS SOLUTION INTRAVENOUS ONCE
Status: COMPLETED | OUTPATIENT
Start: 2025-02-22 | End: 2025-02-22

## 2025-02-22 RX ORDER — SODIUM CHLORIDE 0.9 % (FLUSH) 0.9 %
10 SYRINGE (ML) INJECTION PRN
Status: DISCONTINUED | OUTPATIENT
Start: 2025-02-22 | End: 2025-02-25 | Stop reason: HOSPADM

## 2025-02-22 RX ADMIN — GADOTERIDOL 13 ML: 279.3 INJECTION, SOLUTION INTRAVENOUS at 14:32

## 2025-02-22 RX ADMIN — SODIUM CHLORIDE, PRESERVATIVE FREE 10 ML: 5 INJECTION INTRAVENOUS at 14:32

## 2025-02-22 RX ADMIN — SODIUM CHLORIDE 50 ML: 9 INJECTION, SOLUTION INTRAVENOUS at 14:32

## 2025-02-24 ENCOUNTER — TELEPHONE (OUTPATIENT)
Dept: SURGERY | Age: 85
End: 2025-02-24

## 2025-02-24 DIAGNOSIS — R74.8 ELEVATED LIVER ENZYMES: ICD-10-CM

## 2025-02-24 DIAGNOSIS — K80.50 CHOLEDOCHOLITHIASIS: Primary | ICD-10-CM

## 2025-02-24 NOTE — TELEPHONE ENCOUNTER
Patient wife called and asked about the order for the Gallbladder US, stating that the scheduling department has not received it.   Please place order See the written copy of this report in the patient's paper medical record.  These results did not interface directly into the electronic medical record and are summarized here. Dr. Bailey phone encounter.

## 2025-02-24 NOTE — TELEPHONE ENCOUNTER
PATIENT INSTRUCTIONS    Thank you for seeing me today, it was a pleasure taking care of you.  Please check out at the  and schedule a follow up appointment.  Return in about 2 weeks (around 12/23/2024).  Please remember that the preferred vivi period for appointments is 5 minutes. This is to help maximize the amount of time that we can spend together at our visits.    Increase losartan to 100 mg daily (take two of your 50s)  Monitor blood pressures at home  Send me your readings for 5 days after losartan is at 100 mg  If needed (>140/90) I will plan to start another medication for you before you see me    Best,  Dr. Rodriguez     This patient just had an MRI done.  Results noted.  His biliary stent in place.  No evidence of common duct stone.    More importantly they did not see any stones in the gallbladder either on the MRI.  Previous ultrasound there was mention that they were unable to visualize the gallbladder.  It is contracted.    Please repeat his gallbladder ultrasound either today or tomorrow prior to his office visit on Wednesday this week.  Please call the patient and the family his wife and try to get the gallbladder ultrasound done maybe hopefully tomorrow

## 2025-02-24 NOTE — TELEPHONE ENCOUNTER
Corine called back and I told her that the order is now placed as STAT she states she is going to call radiology scheduling a call back to get the patient scheduled.

## 2025-02-24 NOTE — TELEPHONE ENCOUNTER
Called and spoke to patients wife leif and she states she will call the radiology scheduling number to get in to get the US completed hopefully prior to the appointment on Wednesday.

## 2025-02-25 ENCOUNTER — HOSPITAL ENCOUNTER (OUTPATIENT)
Dept: ULTRASOUND IMAGING | Age: 85
Discharge: HOME | End: 2025-02-27
Payer: MEDICARE

## 2025-02-25 DIAGNOSIS — R74.8 ELEVATED LIVER ENZYMES: ICD-10-CM

## 2025-02-25 DIAGNOSIS — K80.50 CHOLEDOCHOLITHIASIS: ICD-10-CM

## 2025-02-25 PROCEDURE — 76705 ECHO EXAM OF ABDOMEN: CPT

## 2025-02-26 ENCOUNTER — APPOINTMENT (OUTPATIENT)
Dept: GENERAL RADIOLOGY | Age: 85
End: 2025-02-26
Payer: MEDICARE

## 2025-02-26 ENCOUNTER — APPOINTMENT (OUTPATIENT)
Dept: CT IMAGING | Age: 85
End: 2025-02-26
Payer: MEDICARE

## 2025-02-26 ENCOUNTER — HOSPITAL ENCOUNTER (OUTPATIENT)
Age: 85
Setting detail: OBSERVATION
Discharge: HOME | End: 2025-02-27
Attending: EMERGENCY MEDICINE | Admitting: FAMILY MEDICINE
Payer: MEDICARE

## 2025-02-26 ENCOUNTER — OFFICE VISIT (OUTPATIENT)
Age: 85
End: 2025-02-26
Payer: MEDICARE

## 2025-02-26 VITALS
HEART RATE: 112 BPM | WEIGHT: 148 LBS | OXYGEN SATURATION: 99 % | BODY MASS INDEX: 23.23 KG/M2 | HEIGHT: 67 IN | TEMPERATURE: 97 F | DIASTOLIC BLOOD PRESSURE: 58 MMHG | SYSTOLIC BLOOD PRESSURE: 90 MMHG

## 2025-02-26 DIAGNOSIS — R74.8 ELEVATED LIVER ENZYMES: ICD-10-CM

## 2025-02-26 DIAGNOSIS — I95.1 ORTHOSTATIC HYPOTENSION: Primary | ICD-10-CM

## 2025-02-26 DIAGNOSIS — K80.50 CHOLEDOCHOLITHIASIS: Primary | ICD-10-CM

## 2025-02-26 DIAGNOSIS — I42.9 CARDIOMYOPATHY, UNSPECIFIED TYPE (HCC): ICD-10-CM

## 2025-02-26 DIAGNOSIS — I25.810 CORONARY ARTERY DISEASE INVOLVING CORONARY BYPASS GRAFT OF NATIVE HEART WITHOUT ANGINA PECTORIS: ICD-10-CM

## 2025-02-26 DIAGNOSIS — I50.20 HEART FAILURE WITH REDUCED EJECTION FRACTION (HCC): ICD-10-CM

## 2025-02-26 PROBLEM — I95.9 HYPOTENSION: Status: ACTIVE | Noted: 2025-02-26

## 2025-02-26 PROBLEM — N17.9 AKI (ACUTE KIDNEY INJURY): Status: RESOLVED | Noted: 2024-03-15 | Resolved: 2025-02-26

## 2025-02-26 LAB
ALBUMIN SERPL-MCNC: 3.4 G/DL (ref 3.5–5.2)
ALP SERPL-CCNC: 89 U/L (ref 40–129)
ALT SERPL-CCNC: 20 U/L (ref 10–50)
ANION GAP SERPL CALCULATED.3IONS-SCNC: 12 MMOL/L (ref 9–16)
AST SERPL-CCNC: 28 U/L (ref 10–50)
BASOPHILS # BLD: 0 K/UL (ref 0–0.2)
BASOPHILS NFR BLD: 1 % (ref 0–2)
BILIRUB SERPL-MCNC: 0.5 MG/DL (ref 0–1.2)
BILIRUB UR QL STRIP: NEGATIVE
BUN SERPL-MCNC: 11 MG/DL (ref 8–23)
CALCIUM SERPL-MCNC: 9.2 MG/DL (ref 8.6–10.4)
CHLORIDE SERPL-SCNC: 99 MMOL/L (ref 98–107)
CLARITY UR: CLEAR
CO2 SERPL-SCNC: 23 MMOL/L (ref 20–31)
COLOR UR: YELLOW
COMMENT: ABNORMAL
CREAT SERPL-MCNC: 1.1 MG/DL (ref 0.7–1.2)
EOSINOPHIL # BLD: 0.3 K/UL (ref 0–0.4)
EOSINOPHILS RELATIVE PERCENT: 5 % (ref 0–4)
ERYTHROCYTE [DISTWIDTH] IN BLOOD BY AUTOMATED COUNT: 14.1 % (ref 11.5–14.9)
EST. AVERAGE GLUCOSE BLD GHB EST-MCNC: 206 MG/DL
FLUAV RNA RESP QL NAA+PROBE: NOT DETECTED
FLUBV RNA RESP QL NAA+PROBE: NOT DETECTED
GFR, ESTIMATED: 67 ML/MIN/1.73M2
GLUCOSE BLD-MCNC: 202 MG/DL (ref 75–110)
GLUCOSE SERPL-MCNC: 394 MG/DL (ref 74–99)
GLUCOSE UR STRIP-MCNC: ABNORMAL MG/DL
HBA1C MFR BLD: 8.8 % (ref 4–6)
HCT VFR BLD AUTO: 38.9 % (ref 41–53)
HGB BLD-MCNC: 12.9 G/DL (ref 13.5–17.5)
HGB UR QL STRIP.AUTO: NEGATIVE
KETONES UR STRIP-MCNC: NEGATIVE MG/DL
LEUKOCYTE ESTERASE UR QL STRIP: NEGATIVE
LIPASE SERPL-CCNC: 36 U/L (ref 13–60)
LYMPHOCYTES NFR BLD: 1.1 K/UL (ref 1–4.8)
LYMPHOCYTES RELATIVE PERCENT: 17 % (ref 24–44)
MAGNESIUM SERPL-MCNC: 1.8 MG/DL (ref 1.6–2.4)
MCH RBC QN AUTO: 32.6 PG (ref 26–34)
MCHC RBC AUTO-ENTMCNC: 33.2 G/DL (ref 31–37)
MCV RBC AUTO: 98.4 FL (ref 80–100)
MONOCYTES NFR BLD: 0.4 K/UL (ref 0.1–1.3)
MONOCYTES NFR BLD: 6 % (ref 1–7)
NEUTROPHILS NFR BLD: 71 % (ref 36–66)
NEUTS SEG NFR BLD: 4.6 K/UL (ref 1.3–9.1)
NITRITE UR QL STRIP: NEGATIVE
PH UR STRIP: 7.5 [PH] (ref 5–8)
PLATELET # BLD AUTO: 212 K/UL (ref 150–450)
PMV BLD AUTO: 7.1 FL (ref 6–12)
POTASSIUM SERPL-SCNC: 4.9 MMOL/L (ref 3.7–5.3)
PROT SERPL-MCNC: 6.9 G/DL (ref 6.6–8.7)
PROT UR STRIP-MCNC: NEGATIVE MG/DL
RBC # BLD AUTO: 3.96 M/UL (ref 4.5–5.9)
SARS-COV-2 RNA RESP QL NAA+PROBE: NOT DETECTED
SODIUM SERPL-SCNC: 134 MMOL/L (ref 136–145)
SOURCE: NORMAL
SP GR UR STRIP: 1.02 (ref 1–1.03)
SPECIMEN DESCRIPTION: NORMAL
TROPONIN I SERPL HS-MCNC: 34 NG/L (ref 0–22)
TROPONIN I SERPL HS-MCNC: 40 NG/L (ref 0–22)
TSH SERPL DL<=0.05 MIU/L-ACNC: 1.22 UIU/ML (ref 0.27–4.2)
UROBILINOGEN UR STRIP-ACNC: NORMAL EU/DL (ref 0–1)
WBC OTHER # BLD: 6.4 K/UL (ref 3.5–11)

## 2025-02-26 PROCEDURE — 86403 PARTICLE AGGLUT ANTBDY SCRN: CPT

## 2025-02-26 PROCEDURE — 96360 HYDRATION IV INFUSION INIT: CPT

## 2025-02-26 PROCEDURE — 96361 HYDRATE IV INFUSION ADD-ON: CPT

## 2025-02-26 PROCEDURE — 2580000003 HC RX 258: Performed by: FAMILY MEDICINE

## 2025-02-26 PROCEDURE — 2500000003 HC RX 250 WO HCPCS: Performed by: EMERGENCY MEDICINE

## 2025-02-26 PROCEDURE — 83690 ASSAY OF LIPASE: CPT

## 2025-02-26 PROCEDURE — 71045 X-RAY EXAM CHEST 1 VIEW: CPT

## 2025-02-26 PROCEDURE — 85025 COMPLETE CBC W/AUTO DIFF WBC: CPT

## 2025-02-26 PROCEDURE — 1123F ACP DISCUSS/DSCN MKR DOCD: CPT | Performed by: SURGERY

## 2025-02-26 PROCEDURE — 84484 ASSAY OF TROPONIN QUANT: CPT

## 2025-02-26 PROCEDURE — 99214 OFFICE O/P EST MOD 30 MIN: CPT | Performed by: SURGERY

## 2025-02-26 PROCEDURE — 36415 COLL VENOUS BLD VENIPUNCTURE: CPT

## 2025-02-26 PROCEDURE — 3074F SYST BP LT 130 MM HG: CPT | Performed by: SURGERY

## 2025-02-26 PROCEDURE — 6370000000 HC RX 637 (ALT 250 FOR IP): Performed by: FAMILY MEDICINE

## 2025-02-26 PROCEDURE — 83735 ASSAY OF MAGNESIUM: CPT

## 2025-02-26 PROCEDURE — 6360000004 HC RX CONTRAST MEDICATION: Performed by: EMERGENCY MEDICINE

## 2025-02-26 PROCEDURE — 83036 HEMOGLOBIN GLYCOSYLATED A1C: CPT

## 2025-02-26 PROCEDURE — 2500000003 HC RX 250 WO HCPCS: Performed by: FAMILY MEDICINE

## 2025-02-26 PROCEDURE — 87636 SARSCOV2 & INF A&B AMP PRB: CPT

## 2025-02-26 PROCEDURE — 82947 ASSAY GLUCOSE BLOOD QUANT: CPT

## 2025-02-26 PROCEDURE — G0378 HOSPITAL OBSERVATION PER HR: HCPCS

## 2025-02-26 PROCEDURE — 99285 EMERGENCY DEPT VISIT HI MDM: CPT

## 2025-02-26 PROCEDURE — 81003 URINALYSIS AUTO W/O SCOPE: CPT

## 2025-02-26 PROCEDURE — 2580000003 HC RX 258: Performed by: EMERGENCY MEDICINE

## 2025-02-26 PROCEDURE — 93005 ELECTROCARDIOGRAM TRACING: CPT | Performed by: EMERGENCY MEDICINE

## 2025-02-26 PROCEDURE — 87186 SC STD MICRODIL/AGAR DIL: CPT

## 2025-02-26 PROCEDURE — 84443 ASSAY THYROID STIM HORMONE: CPT

## 2025-02-26 PROCEDURE — 72193 CT PELVIS W/DYE: CPT

## 2025-02-26 PROCEDURE — 87086 URINE CULTURE/COLONY COUNT: CPT

## 2025-02-26 PROCEDURE — 80053 COMPREHEN METABOLIC PANEL: CPT

## 2025-02-26 PROCEDURE — 3078F DIAST BP <80 MM HG: CPT | Performed by: SURGERY

## 2025-02-26 RX ORDER — ASPIRIN 81 MG/1
81 TABLET, CHEWABLE ORAL DAILY
Status: DISCONTINUED | OUTPATIENT
Start: 2025-02-26 | End: 2025-02-27 | Stop reason: HOSPADM

## 2025-02-26 RX ORDER — SODIUM CHLORIDE 0.9 % (FLUSH) 0.9 %
5-40 SYRINGE (ML) INJECTION PRN
Status: DISCONTINUED | OUTPATIENT
Start: 2025-02-26 | End: 2025-02-27 | Stop reason: HOSPADM

## 2025-02-26 RX ORDER — ACETAMINOPHEN 325 MG/1
650 TABLET ORAL EVERY 6 HOURS PRN
Status: DISCONTINUED | OUTPATIENT
Start: 2025-02-26 | End: 2025-02-27 | Stop reason: HOSPADM

## 2025-02-26 RX ORDER — IOPAMIDOL 755 MG/ML
75 INJECTION, SOLUTION INTRAVASCULAR
Status: COMPLETED | OUTPATIENT
Start: 2025-02-26 | End: 2025-02-26

## 2025-02-26 RX ORDER — SODIUM CHLORIDE 0.9 % (FLUSH) 0.9 %
10 SYRINGE (ML) INJECTION PRN
Status: DISCONTINUED | OUTPATIENT
Start: 2025-02-26 | End: 2025-02-27 | Stop reason: HOSPADM

## 2025-02-26 RX ORDER — ONDANSETRON 2 MG/ML
4 INJECTION INTRAMUSCULAR; INTRAVENOUS EVERY 6 HOURS PRN
Status: DISCONTINUED | OUTPATIENT
Start: 2025-02-26 | End: 2025-02-27 | Stop reason: HOSPADM

## 2025-02-26 RX ORDER — INSULIN GLARGINE 100 [IU]/ML
16 INJECTION, SOLUTION SUBCUTANEOUS NIGHTLY
Status: DISCONTINUED | OUTPATIENT
Start: 2025-02-26 | End: 2025-02-27 | Stop reason: HOSPADM

## 2025-02-26 RX ORDER — ENOXAPARIN SODIUM 100 MG/ML
40 INJECTION SUBCUTANEOUS DAILY
Status: DISCONTINUED | OUTPATIENT
Start: 2025-02-26 | End: 2025-02-27 | Stop reason: HOSPADM

## 2025-02-26 RX ORDER — ONDANSETRON 4 MG/1
4 TABLET, ORALLY DISINTEGRATING ORAL EVERY 8 HOURS PRN
Status: DISCONTINUED | OUTPATIENT
Start: 2025-02-26 | End: 2025-02-27 | Stop reason: HOSPADM

## 2025-02-26 RX ORDER — LOSARTAN POTASSIUM 25 MG/1
25 TABLET ORAL DAILY
Status: DISCONTINUED | OUTPATIENT
Start: 2025-02-26 | End: 2025-02-27 | Stop reason: HOSPADM

## 2025-02-26 RX ORDER — SODIUM CHLORIDE 9 MG/ML
INJECTION, SOLUTION INTRAVENOUS PRN
Status: DISCONTINUED | OUTPATIENT
Start: 2025-02-26 | End: 2025-02-27 | Stop reason: HOSPADM

## 2025-02-26 RX ORDER — ACETAMINOPHEN 650 MG/1
650 SUPPOSITORY RECTAL EVERY 6 HOURS PRN
Status: DISCONTINUED | OUTPATIENT
Start: 2025-02-26 | End: 2025-02-27 | Stop reason: HOSPADM

## 2025-02-26 RX ORDER — ACETAMINOPHEN 500 MG
1000 TABLET ORAL EVERY 6 HOURS PRN
Status: DISCONTINUED | OUTPATIENT
Start: 2025-02-26 | End: 2025-02-27 | Stop reason: HOSPADM

## 2025-02-26 RX ORDER — 0.9 % SODIUM CHLORIDE 0.9 %
500 INTRAVENOUS SOLUTION INTRAVENOUS ONCE
Status: COMPLETED | OUTPATIENT
Start: 2025-02-26 | End: 2025-02-26

## 2025-02-26 RX ORDER — SODIUM CHLORIDE 9 MG/ML
INJECTION, SOLUTION INTRAVENOUS CONTINUOUS
Status: DISCONTINUED | OUTPATIENT
Start: 2025-02-26 | End: 2025-02-27

## 2025-02-26 RX ORDER — ROSUVASTATIN CALCIUM 40 MG/1
40 TABLET, COATED ORAL DAILY
Status: DISCONTINUED | OUTPATIENT
Start: 2025-02-26 | End: 2025-02-27 | Stop reason: HOSPADM

## 2025-02-26 RX ORDER — INSULIN LISPRO 100 [IU]/ML
0-4 INJECTION, SOLUTION INTRAVENOUS; SUBCUTANEOUS
Status: DISCONTINUED | OUTPATIENT
Start: 2025-02-26 | End: 2025-02-27 | Stop reason: HOSPADM

## 2025-02-26 RX ORDER — POTASSIUM CHLORIDE 7.45 MG/ML
10 INJECTION INTRAVENOUS PRN
Status: DISCONTINUED | OUTPATIENT
Start: 2025-02-26 | End: 2025-02-27 | Stop reason: HOSPADM

## 2025-02-26 RX ORDER — VITAMIN B COMPLEX
1000 TABLET ORAL DAILY
Status: DISCONTINUED | OUTPATIENT
Start: 2025-02-26 | End: 2025-02-27 | Stop reason: HOSPADM

## 2025-02-26 RX ORDER — M-VIT,TX,IRON,MINS/CALC/FOLIC 27MG-0.4MG
1 TABLET ORAL DAILY
Status: DISCONTINUED | OUTPATIENT
Start: 2025-02-26 | End: 2025-02-27 | Stop reason: HOSPADM

## 2025-02-26 RX ORDER — MEMANTINE HYDROCHLORIDE 10 MG/1
5 TABLET ORAL 2 TIMES DAILY
Status: DISCONTINUED | OUTPATIENT
Start: 2025-02-26 | End: 2025-02-27 | Stop reason: HOSPADM

## 2025-02-26 RX ORDER — LEVOTHYROXINE SODIUM 88 UG/1
88 TABLET ORAL DAILY
Status: DISCONTINUED | OUTPATIENT
Start: 2025-02-27 | End: 2025-02-27 | Stop reason: HOSPADM

## 2025-02-26 RX ORDER — POTASSIUM CHLORIDE 1500 MG/1
40 TABLET, EXTENDED RELEASE ORAL PRN
Status: DISCONTINUED | OUTPATIENT
Start: 2025-02-26 | End: 2025-02-27 | Stop reason: HOSPADM

## 2025-02-26 RX ORDER — SODIUM CHLORIDE 0.9 % (FLUSH) 0.9 %
5-40 SYRINGE (ML) INJECTION EVERY 12 HOURS SCHEDULED
Status: DISCONTINUED | OUTPATIENT
Start: 2025-02-26 | End: 2025-02-27 | Stop reason: HOSPADM

## 2025-02-26 RX ORDER — POLYETHYLENE GLYCOL 3350 17 G/17G
17 POWDER, FOR SOLUTION ORAL DAILY PRN
Status: DISCONTINUED | OUTPATIENT
Start: 2025-02-26 | End: 2025-02-27 | Stop reason: HOSPADM

## 2025-02-26 RX ORDER — ROSUVASTATIN CALCIUM 20 MG/1
20 TABLET, COATED ORAL EVERY MORNING
COMMUNITY
End: 2025-02-26

## 2025-02-26 RX ORDER — BENZONATATE 100 MG/1
100 CAPSULE ORAL 3 TIMES DAILY PRN
Status: DISCONTINUED | OUTPATIENT
Start: 2025-02-26 | End: 2025-02-27 | Stop reason: HOSPADM

## 2025-02-26 RX ORDER — METOPROLOL SUCCINATE 25 MG/1
25 TABLET, EXTENDED RELEASE ORAL DAILY
Status: DISCONTINUED | OUTPATIENT
Start: 2025-02-26 | End: 2025-02-27 | Stop reason: HOSPADM

## 2025-02-26 RX ORDER — LANOLIN ALCOHOL/MO/W.PET/CERES
400 CREAM (GRAM) TOPICAL 2 TIMES DAILY
Status: DISCONTINUED | OUTPATIENT
Start: 2025-02-26 | End: 2025-02-27 | Stop reason: HOSPADM

## 2025-02-26 RX ORDER — MAGNESIUM SULFATE HEPTAHYDRATE 40 MG/ML
2000 INJECTION, SOLUTION INTRAVENOUS PRN
Status: DISCONTINUED | OUTPATIENT
Start: 2025-02-26 | End: 2025-02-27 | Stop reason: HOSPADM

## 2025-02-26 RX ORDER — DEXTROSE MONOHYDRATE 100 MG/ML
INJECTION, SOLUTION INTRAVENOUS CONTINUOUS PRN
Status: DISCONTINUED | OUTPATIENT
Start: 2025-02-26 | End: 2025-02-27 | Stop reason: HOSPADM

## 2025-02-26 RX ORDER — 0.9 % SODIUM CHLORIDE 0.9 %
100 INTRAVENOUS SOLUTION INTRAVENOUS ONCE
Status: COMPLETED | OUTPATIENT
Start: 2025-02-26 | End: 2025-02-26

## 2025-02-26 RX ADMIN — Medication 400 MG: at 21:48

## 2025-02-26 RX ADMIN — MICONAZOLE NITRATE: 20 POWDER TOPICAL at 21:51

## 2025-02-26 RX ADMIN — METFORMIN HYDROCHLORIDE 500 MG: 500 TABLET, FILM COATED ORAL at 21:47

## 2025-02-26 RX ADMIN — INSULIN LISPRO 1 UNITS: 100 INJECTION, SOLUTION INTRAVENOUS; SUBCUTANEOUS at 21:46

## 2025-02-26 RX ADMIN — SODIUM CHLORIDE 500 ML: 0.9 INJECTION, SOLUTION INTRAVENOUS at 12:13

## 2025-02-26 RX ADMIN — MEMANTINE 5 MG: 10 TABLET ORAL at 21:46

## 2025-02-26 RX ADMIN — Medication 1 TABLET: at 21:46

## 2025-02-26 RX ADMIN — SODIUM CHLORIDE, PRESERVATIVE FREE 10 ML: 5 INJECTION INTRAVENOUS at 13:08

## 2025-02-26 RX ADMIN — INSULIN GLARGINE 16 UNITS: 100 INJECTION, SOLUTION SUBCUTANEOUS at 21:45

## 2025-02-26 RX ADMIN — SODIUM CHLORIDE: 0.9 INJECTION, SOLUTION INTRAVENOUS at 21:58

## 2025-02-26 RX ADMIN — IOPAMIDOL 75 ML: 755 INJECTION, SOLUTION INTRAVENOUS at 13:08

## 2025-02-26 RX ADMIN — SODIUM CHLORIDE, PRESERVATIVE FREE 10 ML: 5 INJECTION INTRAVENOUS at 21:51

## 2025-02-26 RX ADMIN — SODIUM CHLORIDE 100 ML: 9 INJECTION, SOLUTION INTRAVENOUS at 13:08

## 2025-02-26 RX ADMIN — ROSUVASTATIN 40 MG: 40 TABLET, FILM COATED ORAL at 21:46

## 2025-02-26 RX ADMIN — SODIUM CHLORIDE 500 ML: 0.9 INJECTION, SOLUTION INTRAVENOUS at 17:53

## 2025-02-26 RX ADMIN — Medication 3 MG: at 21:46

## 2025-02-26 RX ADMIN — Medication 1000 UNITS: at 21:48

## 2025-02-26 NOTE — ED PROVIDER NOTES
EMERGENCY DEPARTMENT ENCOUNTER    Pt Name: Doe Kapadia  MRN: [id number]  Birthdate 1940  Date of evaluation: 2/26/25  CHIEF COMPLAINT       Chief Complaint   Patient presents with    Hypotension    Dizziness     HISTORY OF PRESENT ILLNESS   HPI  Was had a routine check For biliary stent this morning at his general surgeons office.  They noticed his blood pressure was in the 80s they sent him here for evaluation.  He said some generalized lightheadedness fatigue dizziness that is not new it has been ongoing.  His blood pressures been running in the 80s to 90s, taken off his blood pressure medication several months ago.  Has a biliary stent for biliary dysfunction and recent jaundice.  He supposed to get the biliary stent out next month by GI because he has been recovering very well.  Decreased intake of liquids.  This has been ongoing for several months.  Lives at home with family.  He is a chronic rash on his buttocks and groin for the past 2 months it has been getting progressively worse.  He wears depends.  Has some mild dementia the family suspects that has been developing.  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  He has been eating well, no nausea vomiting diarrhea.  No dysuria frequency urgency.  No cough or congestion.  He has lost about 40 pounds over the past year.      REVIEW OF SYSTEMS     Review of Systems   All other systems reviewed and are negative.    PASTMEDICAL HISTORY     Past Medical History:   Diagnosis Date    Arthritis     CAD (coronary artery disease)     CHF (congestive heart failure) (HCC)     Diabetes mellitus (HCC)     History of blood transfusion     Hyperlipidemia     Hypertension     PVC (premature ventricular contraction)     SOB (shortness of breath)     Thyroid disease      Past Problem List  Patient Active Problem List   Diagnosis Code    Diverticulosis of colon K57.30       SpO2 100%   BMI 23.18 kg/m²    Physical Exam  Constitutional:       General: He is not in acute distress.     Appearance: Normal appearance. He is well-developed. He is not diaphoretic.   HENT:      Head: Normocephalic and atraumatic.      Right Ear: External ear normal.      Left Ear: External ear normal.      Nose: Nose normal. No congestion.      Mouth/Throat:      Mouth: Mucous membranes are moist.      Pharynx: Oropharynx is clear.   Eyes:      General:         Right eye: No discharge.         Left eye: No discharge.      Conjunctiva/sclera: Conjunctivae normal.      Pupils: Pupils are equal, round, and reactive to light.   Neck:      Trachea: No tracheal deviation.   Cardiovascular:      Rate and Rhythm: Normal rate and regular rhythm.      Pulses: Normal pulses.      Heart sounds: Normal heart sounds.   Pulmonary:      Effort: Pulmonary effort is normal. No respiratory distress.      Breath sounds: Normal breath sounds. No wheezing or rales.   Abdominal:      Palpations: Abdomen is soft.      Tenderness: There is no abdominal tenderness. There is no guarding or rebound.      Comments: No tenderness.   Musculoskeletal:         General: No tenderness or deformity. Normal range of motion.      Cervical back: Normal range of motion and neck supple.   Skin:     General: Skin is warm and dry.      Capillary Refill: Capillary refill takes less than 2 seconds.      Findings: No erythema.      Comments: Chronic scaly rash on his buttocks and in his pelvis perennial him bilateral groin scrotum, some mild open areas of the skin.  I do not see any purulence or surrounding erythema.   Neurological:      General: No focal deficit present.      Mental Status: He is alert and oriented to person, place, and time.      Coordination: Coordination normal.      Comments: GCS 15  Strength in arms 5/5  Strength in legs 5/5  Sensation intact bl arms and legs  No facial droop  Speech is clear, no dysarthria or aphasia  No ataxia

## 2025-02-26 NOTE — PATIENT INSTRUCTIONS
Will plan for ultrasound at the same time as stent removal with Dr. Gurrola in April.  Recommendation is to continue to monitor symptoms.  Stay well hydrated.

## 2025-02-26 NOTE — ED TRIAGE NOTES
Mode of arrival (squad #, walk in, police, etc) : walk-in        Chief complaint(s): hypotension/dizziness        Arrival Note (brief scenario, treatment PTA, etc).: pt family reports above symptoms on and off and can't tell how long its been going on. Pt has been having low readings since January.         C= \"Have you ever felt that you should Cut down on your drinking?\"  No  A= \"Have people Annoyed you by criticizing your drinking?\"  No  G= \"Have you ever felt bad or Guilty about your drinking?\"  No  E= \"Have you ever had a drink as an Eye-opener first thing in the morning to steady your nerves or to help a hangover?\"  No      Deferred []      Reason for deferring: N/A    *If yes to two or more: probable alcohol abuse.*

## 2025-02-27 VITALS
TEMPERATURE: 98.6 F | BODY MASS INDEX: 23.67 KG/M2 | DIASTOLIC BLOOD PRESSURE: 48 MMHG | SYSTOLIC BLOOD PRESSURE: 101 MMHG | OXYGEN SATURATION: 99 % | HEART RATE: 81 BPM | RESPIRATION RATE: 18 BRPM | HEIGHT: 67 IN | WEIGHT: 150.79 LBS

## 2025-02-27 LAB
ANION GAP SERPL CALCULATED.3IONS-SCNC: 10 MMOL/L (ref 9–16)
BASOPHILS # BLD: 0 K/UL (ref 0–0.2)
BASOPHILS NFR BLD: 1 % (ref 0–2)
BUN SERPL-MCNC: 10 MG/DL (ref 8–23)
CALCIUM SERPL-MCNC: 8.6 MG/DL (ref 8.6–10.4)
CHLORIDE SERPL-SCNC: 106 MMOL/L (ref 98–107)
CO2 SERPL-SCNC: 23 MMOL/L (ref 20–31)
CREAT SERPL-MCNC: 0.9 MG/DL (ref 0.7–1.2)
EKG ATRIAL RATE: 91 BPM
EKG P AXIS: 82 DEGREES
EKG P-R INTERVAL: 216 MS
EKG Q-T INTERVAL: 392 MS
EKG QRS DURATION: 120 MS
EKG QTC CALCULATION (BAZETT): 482 MS
EKG R AXIS: -35 DEGREES
EKG T AXIS: 51 DEGREES
EKG VENTRICULAR RATE: 91 BPM
EOSINOPHIL # BLD: 0.5 K/UL (ref 0–0.4)
EOSINOPHILS RELATIVE PERCENT: 8 % (ref 0–4)
ERYTHROCYTE [DISTWIDTH] IN BLOOD BY AUTOMATED COUNT: 13.9 % (ref 11.5–14.9)
GFR, ESTIMATED: 85 ML/MIN/1.73M2
GLUCOSE BLD-MCNC: 132 MG/DL (ref 75–110)
GLUCOSE BLD-MCNC: 193 MG/DL (ref 75–110)
GLUCOSE BLD-MCNC: 219 MG/DL (ref 75–110)
GLUCOSE SERPL-MCNC: 116 MG/DL (ref 74–99)
HCT VFR BLD AUTO: 35.2 % (ref 41–53)
HGB BLD-MCNC: 11.7 G/DL (ref 13.5–17.5)
LYMPHOCYTES NFR BLD: 1.4 K/UL (ref 1–4.8)
LYMPHOCYTES RELATIVE PERCENT: 23 % (ref 24–44)
MCH RBC QN AUTO: 32.9 PG (ref 26–34)
MCHC RBC AUTO-ENTMCNC: 33.2 G/DL (ref 31–37)
MCV RBC AUTO: 99.1 FL (ref 80–100)
MONOCYTES NFR BLD: 0.4 K/UL (ref 0.1–1.3)
MONOCYTES NFR BLD: 7 % (ref 1–7)
NEUTROPHILS NFR BLD: 61 % (ref 36–66)
NEUTS SEG NFR BLD: 3.7 K/UL (ref 1.3–9.1)
PLATELET # BLD AUTO: 191 K/UL (ref 150–450)
PMV BLD AUTO: 7.1 FL (ref 6–12)
POTASSIUM SERPL-SCNC: 4 MMOL/L (ref 3.7–5.3)
RBC # BLD AUTO: 3.55 M/UL (ref 4.5–5.9)
SODIUM SERPL-SCNC: 139 MMOL/L (ref 136–145)
WBC OTHER # BLD: 6 K/UL (ref 3.5–11)

## 2025-02-27 PROCEDURE — 36415 COLL VENOUS BLD VENIPUNCTURE: CPT

## 2025-02-27 PROCEDURE — 2500000003 HC RX 250 WO HCPCS: Performed by: FAMILY MEDICINE

## 2025-02-27 PROCEDURE — 6360000002 HC RX W HCPCS: Performed by: FAMILY MEDICINE

## 2025-02-27 PROCEDURE — G0378 HOSPITAL OBSERVATION PER HR: HCPCS

## 2025-02-27 PROCEDURE — 99223 1ST HOSP IP/OBS HIGH 75: CPT | Performed by: NURSE PRACTITIONER

## 2025-02-27 PROCEDURE — 82947 ASSAY GLUCOSE BLOOD QUANT: CPT

## 2025-02-27 PROCEDURE — 93010 ELECTROCARDIOGRAM REPORT: CPT | Performed by: INTERNAL MEDICINE

## 2025-02-27 PROCEDURE — 96361 HYDRATE IV INFUSION ADD-ON: CPT

## 2025-02-27 PROCEDURE — 99223 1ST HOSP IP/OBS HIGH 75: CPT | Performed by: FAMILY MEDICINE

## 2025-02-27 PROCEDURE — 6370000000 HC RX 637 (ALT 250 FOR IP): Performed by: FAMILY MEDICINE

## 2025-02-27 PROCEDURE — 96372 THER/PROPH/DIAG INJ SC/IM: CPT

## 2025-02-27 PROCEDURE — 85025 COMPLETE CBC W/AUTO DIFF WBC: CPT

## 2025-02-27 PROCEDURE — 80048 BASIC METABOLIC PNL TOTAL CA: CPT

## 2025-02-27 RX ORDER — VITAMIN B COMPLEX
1000 TABLET ORAL DAILY
Qty: 60 TABLET | Refills: 0
Start: 2025-02-28

## 2025-02-27 RX ADMIN — INSULIN LISPRO 1 UNITS: 100 INJECTION, SOLUTION INTRAVENOUS; SUBCUTANEOUS at 11:34

## 2025-02-27 RX ADMIN — METOPROLOL SUCCINATE 25 MG: 25 TABLET, EXTENDED RELEASE ORAL at 08:08

## 2025-02-27 RX ADMIN — Medication 400 MG: at 08:08

## 2025-02-27 RX ADMIN — METFORMIN HYDROCHLORIDE 500 MG: 500 TABLET, FILM COATED ORAL at 08:08

## 2025-02-27 RX ADMIN — ROSUVASTATIN 40 MG: 40 TABLET, FILM COATED ORAL at 08:08

## 2025-02-27 RX ADMIN — ASPIRIN 81 MG: 81 TABLET, CHEWABLE ORAL at 08:08

## 2025-02-27 RX ADMIN — MICONAZOLE NITRATE: 20 POWDER TOPICAL at 08:09

## 2025-02-27 RX ADMIN — Medication 1000 UNITS: at 08:08

## 2025-02-27 RX ADMIN — SODIUM CHLORIDE, PRESERVATIVE FREE 10 ML: 5 INJECTION INTRAVENOUS at 08:09

## 2025-02-27 RX ADMIN — ENOXAPARIN SODIUM 40 MG: 100 INJECTION SUBCUTANEOUS at 08:08

## 2025-02-27 RX ADMIN — MEMANTINE 5 MG: 10 TABLET ORAL at 08:08

## 2025-02-27 RX ADMIN — METFORMIN HYDROCHLORIDE 500 MG: 500 TABLET, FILM COATED ORAL at 16:55

## 2025-02-27 RX ADMIN — LEVOTHYROXINE SODIUM 88 MCG: 88 TABLET ORAL at 06:26

## 2025-02-27 RX ADMIN — INSULIN LISPRO 1 UNITS: 100 INJECTION, SOLUTION INTRAVENOUS; SUBCUTANEOUS at 16:54

## 2025-02-27 RX ADMIN — Medication 1 TABLET: at 08:08

## 2025-02-27 NOTE — PROGRESS NOTES
Pharmacy Medication History Note      List of current medications patient is taking is complete.    Source of information: patient's caregiver, [employer], Care Everywhere, [employer], [employer] ([phone]), [employer] ([phone])    Changes made to medication list:  Medications removed (include reason, ex. therapy complete or physician discontinued, noncompliance):  Benzonatate 100 mg - therapy complete per patient  Rosuvastatin 20 mg - added by mistake    Medications flagged for provider review:  Rosuvastatin 40 mg 1 tablet daily - patient reports taking 20 mg 1 tablet daily; it appears that the dose increased recently (December 2024)  Losartan 50 mg - patient reports being instructed to separate from metoprolol succinate ER by 8-12 hours when taking, but per [facility], patient should be taking both together in the morning  Metoprolol succinate 25 mg ER - patient reports being instructed to separate from metoprolol succinate ER by 8-12 hours when taking, but per [facility], patient should be taking both together in the morning  Memantine 10 mg - patient reports taking 0.5 tablet twice daily, but prescribed 1 tablet twice daily    Medications added/doses adjusted:  Changed losartan 25 mg 1 tablet daily to 50 mg 1 tablet daily  Changed vitamin D3 1000 IU to 5000 IU     Other notes (ex. Recent course of antibiotics, Coumadin dosing):  OARRS negative  Many medications, including the levothyroxine, were last filled by [facility] in early 2024. Patient reports using multiple pharmacies. Unable to contact [facility] to verify medications, but patient's caregiver reports he is taking them      Current Home Medication List at Time of Admission:  Prior to Admission medications    Medication Sig   vitamin D (VITAMIN D3) 125 MCG (5000 UT) CAPS capsule Take 1 capsule by mouth every morning   Rosuvastatin Calcium 40 MG CPSP Take 40 mg by mouth daily DO NOT TAKE UNTIL LIVER ENZYMES ARE NORMAL  Patient taking differently: Take 20 mg by mouth daily

## 2025-02-27 NOTE — PLAN OF CARE
Problem: Chronic Conditions and Co-morbidities  Goal: Patient's chronic conditions and co-morbidity symptoms are monitored and maintained or improved  2/27/2025 0946 by Hafsa Hamilton RN  Outcome: Progressing  2/26/2025 2234 by Dede Lawrence RN  Outcome: Progressing  Flowsheets (Taken 2/26/2025 1933)  Care Plan - Patient's Chronic Conditions and Co-Morbidity Symptoms are Monitored and Maintained or Improved:   Monitor and assess patient's chronic conditions and comorbid symptoms for stability, deterioration, or improvement   Update acute care plan with appropriate goals if chronic or comorbid symptoms are exacerbated and prevent overall improvement and discharge   Collaborate with multidisciplinary team to address chronic and comorbid conditions and prevent exacerbation or deterioration     Problem: Discharge Planning  Goal: Discharge to home or other facility with appropriate resources  2/27/2025 0946 by Hafsa Hamilton RN  Outcome: Progressing  2/26/2025 2234 by Dede Lawrence RN  Outcome: Progressing  Flowsheets (Taken 2/26/2025 1933)  Discharge to home or other facility with appropriate resources:   Identify barriers to discharge with patient and caregiver   Arrange for needed discharge resources and transportation as appropriate   Arrange for interpreters to assist at discharge as needed   Identify discharge learning needs (meds, wound care, etc)   Refer to discharge planning if patient needs post-hospital services based on physician order or complex needs related to functional status, cognitive ability or social support system     Problem: Safety - Adult  Goal: Free from fall injury  2/27/2025 0946 by Hafsa Hamilton RN  Outcome: Progressing  2/26/2025 2234 by Dede Lawrence RN  Outcome: Progressing     Problem: Neurosensory - Adult  Goal: Achieves stable or improved neurological status  2/27/2025 0946 by Hafsa Hamilton RN  Outcome: Progressing  2/26/2025 2234 by Dede Lawrence RN  Outcome:

## 2025-02-27 NOTE — CARE COORDINATION
Does patient qualify for Remote Patient Monitoring (RPM)? Yes    Offered Remote Patient Monitoring to patient. Patient Accepted    Kit # [id number] and size Standard     Was RPM kit delivered to patient's room at discharge? Yes

## 2025-02-27 NOTE — ACP (ADVANCE CARE PLANNING)
Advance Care Planning     Advance Care Planning Activator (Inpatient)  Conversation Note      Date of ACP Conversation: 2/27/2025     Conversation Conducted with: Patient with Decision Making Capacity    ACP Activator: Jaci Juárez RN        Health Care Decision Maker:     Current Designated Health Care Decision Maker:     Primary Decision Maker: Corine Kapadia - Spouse - [phone]        Care Preferences    Ventilation:  \"If you were in your present state of health and suddenly became very ill and were unable to breathe on your own, what would your preference be about the use of a ventilator (breathing machine) if it were available to you?\"      Would the patient desire the use of ventilator (breathing machine)?: yes    \"If your health worsens and it becomes clear that your chance of recovery is unlikely, what would your preference be about the use of a ventilator (breathing machine) if it were available to you?\"     Would the patient desire the use of ventilator (breathing machine)?: \"I Don't Know\"      Resuscitation  \"CPR works best to restart the heart when there is a sudden event, like a heart attack, in someone who is otherwise healthy. Unfortunately, CPR does not typically restart the heart for people who have serious health conditions or who are very sick.\"    \"In the event your heart stopped as a result of an underlying serious health condition, would you want attempts to be made to restart your heart (answer \"yes\" for attempt to resuscitate) or would you prefer a natural death (answer \"no\" for do not attempt to resuscitate)?\" yes       [] Yes   [] No   Educated Patient / Decision Maker regarding differences between Advance Directives and portable DNR orders.    Length of ACP Conversation in minutes:      Conversation Outcomes:  ACP discussion completed    Follow-up plan:    [] Schedule follow-up conversation to continue planning  [] Referred individual to Provider for additional questions/concerns   []

## 2025-02-27 NOTE — CARE COORDINATION
Case Management Assessment  Initial Evaluation    Date/Time of Evaluation: 2/27/2025 11:32 AM  Assessment Completed by: Jaci Juárez RN    If patient is discharged prior to next notation, then this note serves as note for discharge by case management.    Patient Name: Doe Kapadia                   YOB: 1940  Diagnosis: Orthostatic hypotension [I95.1]  Hypotension [I95.9]  Cardiomyopathy, unspecified type (HCC) [I42.9]                   Date / Time: 2/26/2025 11:34 AM    Patient Admission Status: Observation   Readmission Risk (Low < 19, Mod (19-27), High > 27): Readmission Risk Score: 14.6    Current PCP: Zulma Hill MD  PCP verified by CM? Yes    Chart Reviewed: Yes      History Provided by: Patient, Spouse  Patient Orientation: Alert and Oriented    Patient Cognition: Alert    Hospitalization in the last 30 days (Readmission):  No    If yes, Readmission Assessment in  Navigator will be completed.    Advance Directives:      Code Status: Full Code   Patient's Primary Decision Maker is: Legal Next of Kin    Primary Decision Maker: Corine Kapadia - Spouse - [phone]    Discharge Planning:    Patient lives with: Spouse/Significant Other Type of Home: House  Primary Care Giver: Spouse  Patient Support Systems include: Spouse/Significant Other   Current Financial resources: Medicare  Current community resources: None  Current services prior to admission: Durable Medical Equipment            Current DME: Walker, Shower Chair, Cane, Glucometer (GB, Freestyle Mya)            Type of Home Care services:  None    ADLS  Prior functional level: Assistance with the following:, Bathing, Dressing, Cooking, Housework, Shopping, Mobility  Current functional level: Assistance with the following:, Bathing, Dressing, Cooking, Housework, Shopping, Mobility    PT AM-PAC:   /24  OT AM-PAC:   /24    Family can provide assistance at DC: Yes  Would you like Case Management to discuss the discharge plan

## 2025-02-27 NOTE — PLAN OF CARE
Problem: Chronic Conditions and Co-morbidities  Goal: Patient's chronic conditions and co-morbidity symptoms are monitored and maintained or improved  2/27/2025 0947 by Hafsa Hamilton RN  Outcome: Progressing  2/27/2025 0946 by Hafsa Hamilton RN  Outcome: Progressing  2/26/2025 2234 by Dede Lawrence RN  Outcome: Progressing  Flowsheets (Taken 2/26/2025 1933)  Care Plan - Patient's Chronic Conditions and Co-Morbidity Symptoms are Monitored and Maintained or Improved:   Monitor and assess patient's chronic conditions and comorbid symptoms for stability, deterioration, or improvement   Update acute care plan with appropriate goals if chronic or comorbid symptoms are exacerbated and prevent overall improvement and discharge   Collaborate with multidisciplinary team to address chronic and comorbid conditions and prevent exacerbation or deterioration     Problem: Discharge Planning  Goal: Discharge to home or other facility with appropriate resources  2/27/2025 0947 by Hafsa Hamilton RN  Outcome: Progressing  2/27/2025 0946 by Hafsa Hamilton RN  Outcome: Progressing  2/26/2025 2234 by Dede Lawrence RN  Outcome: Progressing  Flowsheets (Taken 2/26/2025 1933)  Discharge to home or other facility with appropriate resources:   Identify barriers to discharge with patient and caregiver   Arrange for needed discharge resources and transportation as appropriate   Arrange for interpreters to assist at discharge as needed   Identify discharge learning needs (meds, wound care, etc)   Refer to discharge planning if patient needs post-hospital services based on physician order or complex needs related to functional status, cognitive ability or social support system     Problem: Safety - Adult  Goal: Free from fall injury  2/27/2025 0947 by Hafsa Hamilton RN  Outcome: Progressing  2/27/2025 0946 by Hafsa Hamilton RN  Outcome: Progressing  2/26/2025 2234 by Dede Lawrence RN  Outcome: Progressing     Problem: Neurosensory -

## 2025-02-27 NOTE — CONSULTS
Sp Cardiology Cardiology    Consult                        Today's Date: 2/27/2025  Patient Name: Doe Kapadia  Date of admission: 2/26/2025 11:34 AM  Patient's age: 85 y.o., 1940  Admission Dx: Orthostatic hypotension [I95.1]  Hypotension [I95.9]  Cardiomyopathy, unspecified type (HCC) [I42.9]    Reason for Consult:  Cardiac evaluation    Requesting Physician: Renee Worley MD    CHIEF COMPLAINT:  hypotension    History Obtained From:  patient, electronic medical record    HISTORY OF PRESENT ILLNESS:      Per previous documentation: \"Patient had a routine check For biliary stent at his general surgeons office. They noticed his blood pressure was in the 80s they sent him to the ED for evaluation. He said some generalized lightheadedness fatigue dizziness that is not new it has been ongoing. His blood pressures been running in the 80s to 90s, taken off his blood pressure medication several months ago. Has a biliary stent for biliary dysfunction and recent jaundice. He supposed to get the biliary stent out next month by GI because he has been recovering very well. Decreased intake of liquids. This has been ongoing for several months. Lives at home with family. He is a chronic rash on his buttocks and groin for the past 2 months it has been getting progressively worse. Has some mild dementia the family suspects that has been developing.  He has been eating well, no nausea vomiting diarrhea. No dysuria frequency urgency. No cough or congestion. He has lost about 40 pounds over the past year.\"    Pt seen and examined in the room.  Patient resting in bed with RN at bedside. Pt denies any CP or sob.  Patient denies any recent lightheadedness/dizziness. Labs, vitals and tele reviewed-  99    Cardiology consulted for orthostatic hypotension.     Patient follows with [facility] outpatient. Last seen by Dr Vidales 1/15/25:   Hyperlipidemia    Presence of aortocoronary bypass graft    CAD

## 2025-02-27 NOTE — H&P
Family Medicine Admit Note    PCP: Zulma Hill MD    Date of Admission: 2/26/2025    Date of Service: Pt seen/examined on 2/27/2025 and Placed in Observation.    Chief Complaint:  hypotension      History Of Present Illness:   The patient is a 85 y.o. male who presents to [facility] with SBP 80s at appointment in surgeon's office this morning.     He has a history of ischemic cardiomyopathy with LVEF 25-30% on echo 1/7/2025 with CAD and prior CABG and stenting.     On exam, he was sleeping but awakened easily. Denies complaints.     Past Medical History:        Diagnosis Date    Arthritis     CAD (coronary artery disease)     CHF (congestive heart failure) (HCC)     Diabetes mellitus (HCC)     History of blood transfusion     Hyperlipidemia     Hypertension     PVC (premature ventricular contraction)     SOB (shortness of breath)     Thyroid disease        Past Surgical History:        Procedure Laterality Date    CARDIAC SURGERY      COLONOSCOPY  10/05/2011    normal    COLONOSCOPY  03/07/2006    small internal hemorrhoids, diverticulosis    COLONOSCOPY  11/14/2001    small internal hemorrhoids, hyperplastic polyp    CORONARY ANGIOPLASTY WITH STENT PLACEMENT  05/06/2022    x2    CORONARY ARTERY BYPASS GRAFT      x2    ERCP N/A 12/23/2024    ENDOSCOPIC RETROGRADE CHOLANGIOPANCREATOGRAPHY STONE REMOVAL performed by Santy Gurrola MD at [facility] ENDO    EYE SURGERY      Cataract    JOINT REPLACEMENT      Knee        Medications Prior to Admission:    Prior to Admission medications    Medication Sig Start Date End Date Taking? Authorizing Provider   vitamin D (VITAMIN D3) 125 MCG (5000 UT) CAPS capsule Take 1 capsule by mouth every morning   Yes Provider, MD Boogie   Rosuvastatin Calcium 40 MG CPSP Take 40 mg by mouth daily DO NOT TAKE UNTIL LIVER ENZYMES ARE NORMAL  Patient taking differently: Take 20 mg by mouth daily 12/25/24  Yes Shayne Haney MD   memantine (NAMENDA) 10 MG tablet Take 1

## 2025-02-27 NOTE — PLAN OF CARE
Problem: Chronic Conditions and Co-morbidities  Goal: Patient's chronic conditions and co-morbidity symptoms are monitored and maintained or improved  Outcome: Progressing  Flowsheets (Taken 2/26/2025 1933)  Care Plan - Patient's Chronic Conditions and Co-Morbidity Symptoms are Monitored and Maintained or Improved:   Monitor and assess patient's chronic conditions and comorbid symptoms for stability, deterioration, or improvement   Update acute care plan with appropriate goals if chronic or comorbid symptoms are exacerbated and prevent overall improvement and discharge   Collaborate with multidisciplinary team to address chronic and comorbid conditions and prevent exacerbation or deterioration     Problem: Discharge Planning  Goal: Discharge to home or other facility with appropriate resources  Outcome: Progressing  Flowsheets (Taken 2/26/2025 1933)  Discharge to home or other facility with appropriate resources:   Identify barriers to discharge with patient and caregiver   Arrange for needed discharge resources and transportation as appropriate   Arrange for interpreters to assist at discharge as needed   Identify discharge learning needs (meds, wound care, etc)   Refer to discharge planning if patient needs post-hospital services based on physician order or complex needs related to functional status, cognitive ability or social support system     Problem: Safety - Adult  Goal: Free from fall injury  Outcome: Progressing     Problem: Neurosensory - Adult  Goal: Achieves stable or improved neurological status  Outcome: Progressing  Goal: Remains free of injury related to seizures activity  Outcome: Progressing     Problem: Metabolic/Fluid and Electrolytes - Adult  Goal: Electrolytes maintained within normal limits  Outcome: Progressing  Goal: Glucose maintained within prescribed range  Outcome: Progressing

## 2025-02-28 ENCOUNTER — CARE COORDINATION (OUTPATIENT)
Dept: CARE COORDINATION | Age: 85
End: 2025-02-28

## 2025-02-28 DIAGNOSIS — I50.20 HFREF (HEART FAILURE WITH REDUCED EJECTION FRACTION) (HCC): Primary | ICD-10-CM

## 2025-02-28 NOTE — CARE COORDINATION
Care Transitions Note    Initial Call - Call within 2 business days of discharge: Yes    Patient Current Location:  Home: [facility] [address]  [place] [address]    Care Transition Nurse contacted the patient, spouse/partner  by telephone to perform post hospital discharge assessment, verified name and  as identifiers. Provided introduction to self, and explanation of the Care Transition Nurse role.     Patient: Doe Kapadia    Patient : 1940   MRN: [id number]    Reason for Admission: Orthostatic hypotension   Discharge Date: 25  RURS: Readmission Risk Score: 14.6      Last Discharge Facility       Date Complaint Diagnosis Description Type Department Provider    25 Hypotension; Dizziness Orthostatic hypotension ... ED to Hosp-Admission (Discharged) (ADMITTED) [facility] Zulma Ceballos MD; Anoop Ervin...            Was this an external facility discharge? No    Additional needs identified to be addressed with provider   High priority:    *[facility] Follow-up    Discharge date:     [place] [facility]    Diagnosis: Orthostatic hypotension     Initial appointment date offered:   Reason patient wasn't scheduled:  wife wants to schedule    Patient needs: HFU    *Please schedule within 7 days of discharge and contact the patient.             Method of communication with provider: chart routing.    Patients top risk factors for readmission: functional physical ability and medication management    Interventions to address risk factors:   Education:   If you notice any sign or symptom that indicates that you may be having a stroke, activate the  emergency medical services immediately by calling . These symptoms include: uneven  facial expression, arm(s) drifting down, strange speech or loss of speech, vision problems, sudden  severe headache, sudden numbness or face/arms/legs, sudden confusion or difficult  understanding, sudden dizziness, sudden difficulty with walking. Continue or

## 2025-02-28 NOTE — PROGRESS NOTES
Remote Patient Monitoring Treatment Plan    Received request from [facility]/CTN Jyoti Cruz RN  to order remote patient monitoring for in home monitoring of CHF; Condition managed by Dr Rey Vidales ([facility]) and PCP .  and order completed.     Patient will be monitoring blood pressure   pulse ox   weight.      Patient will engage in Remote Patient Monitoring each day to develop the skills necessary for self management.       RPM Care Team Responsibilities:   Alerts will be reviewed daily and addressed within 2-4 hours during operational hours (Monday -Friday 9 am-4 pm)  Alert response and intervention documented in patient medical record  Alert response escalated to PCP per protocol and documented in patient medical record  Patient monitored over approximately  days  Discharge from program based on self-management readiness    See care coordination encounters for additional details.

## 2025-03-01 LAB
MICROORGANISM SPEC CULT: ABNORMAL
SPECIMEN DESCRIPTION: ABNORMAL

## 2025-03-04 ENCOUNTER — CARE COORDINATION (OUTPATIENT)
Dept: CARE COORDINATION | Age: 85
End: 2025-03-04

## 2025-03-04 NOTE — CARE COORDINATION
Care Transitions Note    Follow Up Call     Attempted to reach patient for transitions of care follow up.  Unable to reach patient.      Outreach Attempts:   HIPAA compliant voicemail left for patient.     Care Summary Note: Attempted to reach patient for subsequent call. Left Hipaa appropriate message with contact information requesting return call to [phone]     Follow Up Appointment:   Future Appointments         Provider Specialty Dept Phone    4/1/2025 1:15 PM [facility] PAT RM 4 Pre-Admission Testing [phone]    4/24/2025 1:00 PM Boubacar Bailey MD General Surgery [phone]    5/2/2025 11:15 AM Santy Gurrola MD Gastroenterology [phone]    7/7/2025 3:00 PM Zulma Hill MD Family Medicine [phone]            Plan for follow-up call in 2-5 days based on severity of symptoms and risk factors. Plan for next call:  2nd attempt sub call    Jyoti Cruz RN

## 2025-03-05 ENCOUNTER — CARE COORDINATION (OUTPATIENT)
Dept: CARE COORDINATION | Age: 85
End: 2025-03-05

## 2025-03-05 NOTE — CARE COORDINATION
Zulma Carreon MD Family Medicine [phone]            Care Transition Nurse provided contact information.  Plan for follow-up call in 2-5 days based on severity of symptoms and risk factors.  Plan for next call: symptom management-follow up on BP  follow-up appointment-check about HFU appt  referrals-check on RPM      Jyoti Cruz RN

## 2025-03-05 NOTE — DISCHARGE SUMMARY
Family Medicine Discharge Summary    Doe Kapadia  :  1940  MRN:  [id number]    Admit date:  2025  Discharge date:  2025    Admitting Physician:  Renee Worley MD    Discharge Diagnoses:    Patient Active Problem List   Diagnosis    Diverticulosis of colon    Essential hypertension    Type 2 diabetes mellitus (HCC)    Hyperlipidemia    Type 2 diabetes mellitus without complication, with long-term current use of insulin (HCC)    Coronary artery disease involving native coronary artery of native heart without angina pectoris    Primary osteoarthritis involving multiple joints    Overweight (BMI 25.0-29.9)    Status post cardiac catheterization    Autoimmune thyroiditis    Disorder associated with type 2 diabetes mellitus (HCC)    Hesitancy of micturition    PVC (premature ventricular contraction)    Presence of aortocoronary bypass graft    SOB (shortness of breath)    Ventricular premature depolarization    Vitamin deficiency    Other forms of angina pectoris    Unstable angina (HCC)    Chronic renal disease, stage III ([facility]) [541032]    Weakness generalized    Atherosclerotic heart disease of native coronary artery with other forms of angina pectoris    Jaundice    Coronary artery disease involving coronary bypass graft of native heart without angina pectoris    Acquired hypothyroidism    Ischemic cardiomyopathy    Carotid bruit    Orthostatic hypotension    Hypotension       Admission Condition:  guarded  Discharged Condition:  fair    Hospital Course:   84 yo admitted with orthostatic hypotension. Medications were adjusted and pressures improved.    Discharge Medications:         Medication List        CHANGE how you take these medications      * vitamin D 125 MCG (5000 UT) Caps capsule  Commonly known as: CHOLECALCIFEROL  What changed: Another medication with the same name was changed. Make sure you understand how and when to take each.     * Vitamin D 25 MCG (1000 UT) Tabs

## 2025-03-07 ENCOUNTER — CARE COORDINATION (OUTPATIENT)
Dept: CARE COORDINATION | Age: 85
End: 2025-03-07

## 2025-03-07 ENCOUNTER — CARE COORDINATION (OUTPATIENT)
Dept: OTHER | Facility: CLINIC | Age: 85
End: 2025-03-07

## 2025-03-07 NOTE — CARE COORDINATION
3/7/2025 2:06 PM  *  RPM Verification RPM Verification and Welcome Call Successful? Yes,     Remote Patient Monitoring Welcome Note  Date/Time:  3/7/2025 2:07 PM  Patient Current Location: Home: [address]  [place] [address]  Verified patients name and  as identifiers.       Completed and confirmed the following:    [x] Patient received all RPM equipment (tablet, scale, blood pressure device and cuff, and pulse oximeter)  Cuff Size: regular (9.05\"-15.74\")    Weight Scale:  regular (<330lbs)                    [x] Instructed patient keep box for use when returning equipment                                                          [x] Reviewed Patient Welcome Letter with patient    [x]  Reviewed Consent Form  Copy of consent form in chart.                 [x] Reviewed expectations for patient and care team  Monitoring hours M-F 9-4pm  It is important to take your vitals every day, even on the weekends,to keep your care team aware of how you are doing every day of the week.  Completing monitoring by 12pm on  so that alerts can be responded to in the same day  Patient weighs self at same time every day (or after urinating and waking up)  Take blood pressure 1-2 hrs after medications   RPM team may have different phone area code (including [place], [place], [place] or [place])                              [x] Instructed patient to keep scale on flat surface                                                         [x] Instructed patient to keep tablet plugged in at all times                         [x] Instructed how to contact IT support  ([phone])  [x] Provided Remote Patient Monitoring care  information     Emergency Contact Verified: Corine Kapadia               All questions answered at this time.        MASON QuijanoN, RN  Associate Care Manager   Cell: [phone]  [email or web address]

## 2025-03-07 NOTE — CARE COORDINATION
Care Transitions Note    Initial Call - Call within 2 business days of discharge: Yes    Patient Current Location:  Home: [facility] [address]   [place] [address]    Care Transition Nurse contacted the spouse/partner  by telephone. Verified name and  as identifiers.    Additional needs identified to be addressed with provider   No needs identified                 Method of communication with provider: none.    Care Summary Note: Writer spoke to patient's wife Corine, he is doing well, got started with the RPM, denied any c/o fever, chills, n/v/d sob or chest pain at time of call, patient has been feeling pretty good, going to [place] tomorrow, will continue to follow//JU    Plan of care updates since last contact:  Review of patient management of conditions/medications:         Advance Care Planning:   Does patient have an Advance Directive: reviewed during previous call, see note. .    Medication Review:  No changes since last call.     Remote Patient Monitoring:  Offered patient enrollment in the Remote Patient Monitoring (RPM) program for in-home monitoring: Yes, patient enrolled; current status is activated and monitoring.    Assessments:       Follow Up Appointment:   Reviewed upcoming appointment(s).  Future Appointments         Provider Specialty Dept Phone    2025 1:15 PM [facility] PAT RM 4 Pre-Admission Testing [phone]    2025 1:00 PM Boubacar Bailey MD General Surgery [phone]    2025 11:15 AM Santy Gurrola MD Gastroenterology [phone]    2025 3:00 PM Zulma Hill MD Family Medicine [phone]            Care Transition Nurse provided contact information.  Plan for follow-up call in 6-10 days based on severity of symptoms and risk factors.  Plan for next call: symptom management-follow up for weakness,, vital, rpm      Jyoti Cruz, RN

## 2025-03-10 ENCOUNTER — TELEPHONE (OUTPATIENT)
Dept: PRIMARY CARE CLINIC | Age: 85
End: 2025-03-10

## 2025-03-10 ENCOUNTER — CARE COORDINATION (OUTPATIENT)
Dept: CASE MANAGEMENT | Age: 85
End: 2025-03-10

## 2025-03-10 NOTE — TELEPHONE ENCOUNTER
03/10/25 1:50 PM     REMOTE PATIENT MONITORING HIGH ALERT RESPONSE     Message sent to patient via Patient Connect Portal for Remote Patient Monitoring     ELIDIA Osman, This is Tamara Rg the RPM nurse practitioner with [facility]. I will be calling you shortly from [phone]. Please answer if you are able.       ASSESSMENT AND PLAN   Hypotension  --remains asymptomatic  --repeat BP now 98/54 with HR of 64  --instructions for losartan and metoprolol are to hold meds if systolic BP < 110  --Mrs. Kapadia will hold metoprolol this evening as has been advised by pt's prescribing provider  --advised Mrs. Kapadia to send update on pt's condition tomorrow to PCP  --will continue to monitor    CHIEF COMPLAINT    Responding to a high RPM alert for low BP of 88/51/68 at 11:22A and on repeat at 11:55A, was 80/52/65    HPI    Doe Kapadia is a 85 y.o. male with above low BP alert. Per Mrs. Kapadia, pt has been drinking fluids today and has had lunch. Spoke briefly to him and he denies SOB/CP/nausea/vomiting/dizziness/syncope. Repeated BP together after 3-minute rest period. At 2:13P was 98/54 with HR of 64. Will continue to monitor.    ALLERGIES    No Known Allergies    Tamara Rg DNP, FNP-C, Remote Patient Monitoring NP, ) [phone]

## 2025-03-10 NOTE — CARE COORDINATION
-Remote Alert Monitoring Note      Date/Time:  3/10/2025 11:52 AM  Patient Current Location: Home: [address] [place] [address]  Verified patients name and  as identifiers.    Rpm alert to be reviewed by the provider   red alert  blood pressure reading (88/51)  Vitals Recheck blood pressure reading (80/52)  Additional needs to be addressed by provider:  Spoke to patient and wife they states he is doing well feels fine denies chest  pain, SOB, dizziness blurred vision fatigue. Wife states she did hold both Metoprolol and losartan doses this am. Repeat BP dropped even lower then first reading. Patient is currently eating his lunch and has no concerns                   LPN contacted patient by telephone regarding red alert received   Background: CHF  Refer to 911 immediately if:  Patient unresponsive or unable to provide history  Change in cognition or sudden confusion  Patient unable to respond in complete sentences  Intense chest pain/tightness  Any concern for any clinical emergency  Red Alert: Provider response time of 1 hr required for any red alert requiring intervention  Yellow Alert: Provider response time of 3hr required for any escalated yellow alert  Patient Chief Complaint:  Blood Pressure BP Triage  Are you having any Chest Pain? no   Are you having any Shortness of Breath? no   Do you have a headache or have any vision changes? no   Are you having any numbness or tingling? no   Are you having any other health concerns or issues? no  Patient/Caregiver educated on how to properly take a blood pressure. Patient/Caregiver verbalizes understanding.     Clinical Interventions: Spoke to patient and wife they states he is doing well feels fine denies chest  pain, SOB, dizziness blurred visionfatigue. Wife states she did hold both Metoprolol and losartan doses this am. Repeat BP dropped even lower then first reading. Patient is currently eating his lunch and has no concerns   Plan/Follow Up: Will continue

## 2025-03-11 ENCOUNTER — CARE COORDINATION (OUTPATIENT)
Dept: CARE COORDINATION | Age: 85
End: 2025-03-11

## 2025-03-11 NOTE — CARE COORDINATION
Care Transitions Note    Follow Up Call     Patient Current Location:  Home: [address]   [facility] [address]    Care Transition Nurse contacted the patient by telephone. Verified name and  as identifiers.    Additional needs identified to be addressed with provider   No needs identified                 Method of communication with provider: none.    Care Summary Note:     Writer spoke to patient and wife Corine, BP today  96/52  up from yesterday, patient doing well today, up moving around, he didn't take his metoprolol or losartan yesterday and  also held today, order is to hold if BP is less than 110/60, wife stated patient is doing really well, eating lunch at time of call, will continue to follow//JU    Plan of care updates since last contact:  Review of patient management of conditions/medications:         Advance Care Planning:   Does patient have an Advance Directive: reviewed during previous call, see note. .    Medication Review:       Remote Patient Monitoring:  Offered patient enrollment in the Remote Patient Monitoring (RPM) program for in-home monitoring: Yes, patient enrolled; current status is activated and monitoring.    Assessments:       Follow Up Appointment:   Reviewed upcoming appointment(s).  Future Appointments         Provider Specialty Dept Phone    2025 1:15 PM [facility] PAT RM 4 Pre-Admission Testing [phone]    2025 1:00 PM Boubacar Bailey MD General Surgery [phone]    2025 11:15 AM Santy Gurrola MD Gastroenterology [phone]    2025 3:00 PM Zulma Hill MD Family Medicine [phone]            Care Transition Nurse provided contact information.  Plan for follow-up call in 2-5 days based on severity of symptoms and risk factors.  Plan for next call: symptom management-follow up on BP, medications      Jyoti Cruz RN

## 2025-03-14 VITALS
SYSTOLIC BLOOD PRESSURE: 118 MMHG | OXYGEN SATURATION: 98 % | DIASTOLIC BLOOD PRESSURE: 57 MMHG | HEART RATE: 90 BPM | WEIGHT: 144.4 LBS | BODY MASS INDEX: 22.62 KG/M2

## 2025-03-17 ENCOUNTER — CARE COORDINATION (OUTPATIENT)
Dept: CARE COORDINATION | Age: 85
End: 2025-03-17

## 2025-03-17 NOTE — CARE COORDINATION
Care Transitions Note    Follow Up Call     Patient Current Location:  Home: [address]   [place] [address]    Care Transition Nurse contacted the spouse/partner  by telephone. Verified name and  as identifiers.    Additional needs identified to be addressed with provider   No needs identified                 Method of communication with provider: none.    Care Summary Note: Writer spoke to patient's wife, patient doing well, BP have been good, patient feeling pretty good, no c/o fever, chills, n/v/d sob r chest pain at time of call, still has RPM, will continue to follow for any needs//JU    Plan of care updates since last contact:  Review of patient management of conditions/medications:         Advance Care Planning:   Does patient have an Advance Directive: reviewed during previous call, see note. .    Medication Review:  No changes since last call.     Remote Patient Monitoring:  Offered patient enrollment in the Remote Patient Monitoring (RPM) program for in-home monitoring: Yes, patient enrolled; current status is activated and monitoring.    Assessments:  No changes since last call    Follow Up Appointment:   Reviewed upcoming appointment(s).  Future Appointments         Provider Specialty Dept Phone    2025 1:15 PM [facility] PAT RM 4 Pre-Admission Testing [phone]    2025 1:00 PM Boubacar Bailey MD General Surgery [phone]    2025 11:15 AM Santy Gurrola MD Gastroenterology [phone]    2025 3:00 PM Zulma Hill MD Family Medicine [phone]            Care Transition Nurse provided contact information.  Plan for follow-up call in 6-10 days based on severity of symptoms and risk factors.  Plan for next call: symptom management-follow up on BP, appetite RPM      Jyoti Cruz RN

## 2025-03-25 ENCOUNTER — CARE COORDINATION (OUTPATIENT)
Dept: CARE COORDINATION | Age: 85
End: 2025-03-25

## 2025-03-25 PROBLEM — K80.50 CHOLEDOCHOLITHIASIS: Status: ACTIVE | Noted: 2025-02-07

## 2025-03-25 NOTE — CARE COORDINATION
Care Transitions Note    Final Call     Patient Current Location:  Home: [address]  [place] [address]    LPN Care Coordinator contacted the spouse/partner  by telephone. Verified name and  as identifiers.    Patient graduated from the Care Transitions program on 3/25/25.  Patient/family  has RPM referred to AC .      Advance Care Planning:   Does patient have an Advance Directive: reviewed during previous call, see note. .    Handoff:   Patient/family agreeable to ACM outreach. , Condition management for chf., Patient enrolled in RPM for Kidney Disease   CHF  Diabetes  HTN and will be monitoring blood pressure , pulse ox , and weight daily.   Patient has graduated from Transitions of Care program and will be transitioned to [facility], [place] - [phone]  .   RPM team has been notified of transition in patients care.   Patient provided AC name and contact information for future needs.         Care Summary Note: Writer spoke to pt's spouse Corine she states that patient is doing very well. She states he is eating and drinking fine and has not voiced any concerns. Reviewed RPM v/s /69 p 103 SPO2 99% weight 143.6 Corine is aware that patient will be followed by AC. Pt does not have home care services. Discussed upcoming April appointment also active via [employer] thanked writer for calling.     Assessments:  Care Transitions Subsequent and Final Call    Subsequent and Final Calls  Do you have any ongoing symptoms?: No  Have your medications changed?: No  Do you have any questions related to your medications?: No  Do you currently have any active services?: No  Do you have any needs or concerns that I can assist you with?: No  Care Transitions Interventions     Other Services: Declined     Specialty Service Referral: Completed    Other Interventions:              Upcoming Appointments:    Future Appointments         Provider Specialty Dept Phone    2025  1:15 PM [facility] PAT RM 4 Pre-Admission Testing

## 2025-03-25 NOTE — CARE COORDINATION
DALE received RPM patient hand off from [facility] Jyoti Randallham .  ACM will outreach to patient in 1-2 business days to enroll in a High Risk Care Management Program.

## 2025-03-27 ENCOUNTER — CARE COORDINATION (OUTPATIENT)
Dept: CARE COORDINATION | Age: 85
End: 2025-03-27

## 2025-03-27 NOTE — CARE COORDINATION
Ambulatory Care Coordination Note     3/27/2025 2:00 PM     Patient Current Location:  Home: [address]   [place] [address]     This patient was received as a referral from Ambulatory Care Manager .    Spouse/Partner contacted the spouse/partner by telephone. Verified name and  with spouse/partner as identifiers. Provided introduction to self, and explanation of the ACM role.   Spouse/Partner accepted care management services at this time.          ACM: JOY SANTIAGO RN     Challenges to be reviewed by the provider   Additional needs identified to be addressed with provider No  none               Method of communication with provider: none.    Utilization: Initial Call - N/A    Care Summary Note: CTN hand off- RPM active.   Spoke with patients spouse who reported Yo was busy at this time.   Introduced self, explained care coordination and provided my contact information for any future concerns.     Offered patient enrollment in the Remote Patient Monitoring (RPM) program for in-home monitoring: Yes, patient enrolled; current status is activated and monitoring.     Assessments Completed:   General Assessment    Do you have any symptoms that are causing concern?: No          Advance Care Planning:   Not reviewed during this call     Care Planning:   Education Documentation  General medication information, taught by Joy Santiago, RN at 3/27/2025  1:59 PM.  Learner: Significant Other  Readiness: Acceptance  Method: Explanation  Response: Verbalizes Understanding    Educate reporting changes in condition, taught by Joy Santiago, RN at 3/27/2025  1:59 PM.  Learner: Significant Other  Readiness: Acceptance  Method: Explanation  Response: Verbalizes Understanding    Educate Patient on When to Call for Symptoms, taught by Joy Santiago, RN at 3/27/2025  1:59 PM.  Learner: Significant Other  Readiness: Acceptance  Method: Explanation  Response: Verbalizes Understanding    Educate limitations or barriers to

## 2025-03-31 ENCOUNTER — CARE COORDINATION (OUTPATIENT)
Dept: CARE COORDINATION | Facility: CLINIC | Age: 85
End: 2025-03-31

## 2025-03-31 NOTE — CARE COORDINATION
3/31/2025 8:42 AM  *  Alert and Triage   -Remote Alert Monitoring Note      Date/Time:  3/31/2025 8:42 AM  Patient Current Location: [place]  Verified patients name and  as identifiers.    Rpm alert to be reviewed by the provider   red alert  blood pressure reading (82/55)  Vitals Recheck blood pressure reading (112/62)  Additional needs to be addressed by provider: No             LPN attempted to contact patient by telephone regarding red alert received. Spoke with patients emergency contact / spouse, Corine Kapadia.   Background: Pt enrolled in RPM r/t CHF  Refer to 911 immediately if:  Patient unresponsive or unable to provide history  Change in cognition or sudden confusion  Patient unable to respond in complete sentences  Intense chest pain/tightness  Any concern for any clinical emergency  Red Alert: Provider response time of 1 hr required for any red alert requiring intervention  Yellow Alert: Provider response time of 3hr required for any escalated yellow alert  Patient Chief Complaint:  Blood Pressure BP Triage (answered for pt by emergency contact, Corine Kapadia)  Are you having any Chest Pain? no   Are you having any Shortness of Breath? no   Do you have a headache or have any vision changes? no   Are you having any numbness or tingling? no   Are you having any other health concerns or issues? no  Patient/Caregiver educated on how to properly take a blood pressure. Patient/Caregiver verbalizes understanding.     Clinical Interventions: Reviewed and followed up on alerts and treatments-Per patients spouse, pt denies CP, SOB, headache, vision changes, numbness, tingling and dizziness at this time. States, \"he's doing fine\". Reports pt has not taken losartan or Toprol XL as of this time due to BP reading. Spouse unsure if BP cuff was on properly. Educated on how to properly take a BP, v/u and is agreeable to assist pt with BP recheck at this time. Updated reading of 112/62 noted in HRS and is within RPM

## 2025-04-01 ENCOUNTER — HOSPITAL ENCOUNTER (OUTPATIENT)
Dept: PREADMISSION TESTING | Age: 85
Discharge: HOME | End: 2025-04-05

## 2025-04-01 VITALS — HEIGHT: 67 IN | BODY MASS INDEX: 23.23 KG/M2 | WEIGHT: 148 LBS

## 2025-04-01 NOTE — PROGRESS NOTES
Pre-op Instructions For Out-Patient Endoscopy Surgery    Medication Instructions:  Please stop herbs and any supplements now (includes vitamins and minerals).    For these medications:  Dulaglutide (Trulicity), Exenatide (Byetta and Bydureon, Liraglutide (Victoza), Lixisenatide (Adlyxin), Semaglutide (Ozempic and Rybelsus), Tirzepatide (Mounjaro, Zepbound)- Stop 1 week prior if taking weekly or 1 day prior if taking every 12 hours or daily.     Please contact your surgeon and prescribing physician for pre-op instructions for any blood thinners. STOP ASPIRIN AS DIRECTED    If you have inhalers/aerosol treatments at home, please use them the morning of your surgery and bring the inhalers with you to the hospital.    Please take the following medications the morning of your surgery with a sip of water:    Levothyroxine and Metoprolol succinate    Surgery Instructions:  After midnight before surgery:  Do not eat or drink anything, including water, mints, gum, and hard candy.  You may brush your teeth without swallowing.  No smoking, chewing tobacco, or street drugs.     ** Please Follow Bowel Prep instructions if given by surgeon's office**    Please shower or bathe before surgery.       Please do not wear any cologne, lotion, powder, jewelry, piercings, perfume, makeup, nail polish, hair accessories, or hair spray on the day of surgery.  Wear loose comfortable clothing.    Leave your valuables at home but bring a payment source for any after-surgery prescriptions you plan to fill at [facility].  Bring a storage case for any glasses/contacts.    An adult who is responsible for you MUST drive you home and should be with you for the first 24 hours after surgery.     The Day of Surgery:  Arrive at [facility] Entr[facility] at the time directed by your surgeon and check in at the desk.     If you have a living will or healthcare power of , please bring a copy.    You will be taken to the

## 2025-04-08 ENCOUNTER — CARE COORDINATION (OUTPATIENT)
Dept: CASE MANAGEMENT | Age: 85
End: 2025-04-08

## 2025-04-08 NOTE — CARE COORDINATION
-Remote Alert Monitoring Note      Date/Time:  2025 12:11 PM  Patient Current Location: Home: [address] [place] [address]  Verified patients name and  as identifiers.    Rpm alert to be reviewed by the provider   red alert  blood pressure reading (88/60)  Vitals Recheck blood pressure reading (declined)  Additional needs to be addressed by provider: No                   LPN contacted patient by telephone regarding red alert received   Background: CHF  Refer to 911 immediately if:  Patient unresponsive or unable to provide history  Change in cognition or sudden confusion  Patient unable to respond in complete sentences  Intense chest pain/tightness  Any concern for any clinical emergency  Red Alert: Provider response time of 1 hr required for any red alert requiring intervention  Yellow Alert: Provider response time of 3hr required for any escalated yellow alert  Patient Chief Complaint:  Blood Pressure BP Triage  Are you having any Chest Pain? no   Are you having any Shortness of Breath? no   Do you have a headache or have any vision changes? no   Are you having any numbness or tingling? no   Are you having any other health concerns or issues? no  Patient/Caregiver educated on how to properly take a blood pressure. Patient/Caregiver verbalizes understanding.     Clinical Interventions: Reviewed and followed up on alerts and treatments-Spoke to patients wife she states he is doing fine no complaints of chest pain, dizziness SOB, he is eating at the present time so she will recheck BP when he is done, she does states he has not had much fluids today no other concerns at present time    Plan/Follow Up: Will continue to review, monitor and address alerts with follow up based on severity of symptoms and risk factors.  **For any new or worsening symptoms or you are concerned in anyway, please contact your Provider or report to the nearest Emergency Room.**

## 2025-04-09 NOTE — PRE-PROCEDURE INSTRUCTIONS
No answer, left message ?                             Unable to leave message ?    When were you told to arrive at hospital ? -1100     Do you have a  ?-YES    Are you on any blood thinners ?-ASA                     If yes when did you stop taking ?- 1 WEEK AGO    Do you have your prep Rx filled and instruction ?-N/A      Nothing to eat the day before , only clear liquids.-N/A    Are you experiencing any covid symptoms ? -NONE    Do you have any infections or rash we should be aware of ?-NONE      Do you have the Hibiclens soap to use the night before and the morning of surgery ?-N/A    Nothing to eat or drink after midnight, only a sip of water to take any medication instructed to take the night before.-INSTRUCTED    Wear comfortable clothing, leave any valuables at home, remove any jewelry and body piercing .-INSTRUCTED    *SPOKE WITH PT'S WIFE

## 2025-04-10 ENCOUNTER — ANESTHESIA EVENT (OUTPATIENT)
Dept: ENDOSCOPY | Age: 85
End: 2025-04-10
Payer: MEDICARE

## 2025-04-10 ASSESSMENT — ENCOUNTER SYMPTOMS
BACK PAIN: 0
SHORTNESS OF BREATH: 0
NAUSEA: 0
SINUS PRESSURE: 0
ABDOMINAL PAIN: 0

## 2025-04-11 ENCOUNTER — TELEPHONE (OUTPATIENT)
Dept: PRIMARY CARE CLINIC | Age: 85
End: 2025-04-11

## 2025-04-11 ENCOUNTER — ANESTHESIA (OUTPATIENT)
Dept: ENDOSCOPY | Age: 85
End: 2025-04-11
Payer: MEDICARE

## 2025-04-11 ENCOUNTER — HOSPITAL ENCOUNTER (OUTPATIENT)
Age: 85
Setting detail: OUTPATIENT SURGERY
Discharge: HOME | End: 2025-04-11
Attending: INTERNAL MEDICINE | Admitting: INTERNAL MEDICINE
Payer: MEDICARE

## 2025-04-11 ENCOUNTER — APPOINTMENT (OUTPATIENT)
Dept: GENERAL RADIOLOGY | Age: 85
End: 2025-04-11
Attending: INTERNAL MEDICINE
Payer: MEDICARE

## 2025-04-11 ENCOUNTER — CARE COORDINATION (OUTPATIENT)
Dept: CARE COORDINATION | Age: 85
End: 2025-04-11

## 2025-04-11 VITALS
HEIGHT: 67 IN | BODY MASS INDEX: 23.23 KG/M2 | OXYGEN SATURATION: 98 % | HEART RATE: 67 BPM | RESPIRATION RATE: 14 BRPM | SYSTOLIC BLOOD PRESSURE: 124 MMHG | TEMPERATURE: 96.9 F | DIASTOLIC BLOOD PRESSURE: 67 MMHG | WEIGHT: 148 LBS

## 2025-04-11 LAB
GLUCOSE BLD-MCNC: 252 MG/DL (ref 75–110)
GLUCOSE BLD-MCNC: 256 MG/DL (ref 75–110)

## 2025-04-11 PROCEDURE — 2500000003 HC RX 250 WO HCPCS: Performed by: NURSE ANESTHETIST, CERTIFIED REGISTERED

## 2025-04-11 PROCEDURE — 43276 ERCP STENT EXCHANGE W/DILATE: CPT | Performed by: INTERNAL MEDICINE

## 2025-04-11 PROCEDURE — 2580000003 HC RX 258: Performed by: ANESTHESIOLOGY

## 2025-04-11 PROCEDURE — 7100000031 HC ASPR PHASE II RECOVERY - ADDTL 15 MIN: Performed by: INTERNAL MEDICINE

## 2025-04-11 PROCEDURE — 2709999900 HC NON-CHARGEABLE SUPPLY: Performed by: INTERNAL MEDICINE

## 2025-04-11 PROCEDURE — 3700000001 HC ADD 15 MINUTES (ANESTHESIA): Performed by: INTERNAL MEDICINE

## 2025-04-11 PROCEDURE — 7100000010 HC PHASE II RECOVERY - FIRST 15 MIN: Performed by: INTERNAL MEDICINE

## 2025-04-11 PROCEDURE — 7100000001 HC PACU RECOVERY - ADDTL 15 MIN: Performed by: INTERNAL MEDICINE

## 2025-04-11 PROCEDURE — 3609014300 HC ERCP BALLOON DILATE BILIARY/PANC DUCT/AMPULLA EA: Performed by: INTERNAL MEDICINE

## 2025-04-11 PROCEDURE — 3700000000 HC ANESTHESIA ATTENDED CARE: Performed by: INTERNAL MEDICINE

## 2025-04-11 PROCEDURE — C1769 GUIDE WIRE: HCPCS | Performed by: INTERNAL MEDICINE

## 2025-04-11 PROCEDURE — 82947 ASSAY GLUCOSE BLOOD QUANT: CPT

## 2025-04-11 PROCEDURE — 2580000003 HC RX 258: Performed by: INTERNAL MEDICINE

## 2025-04-11 PROCEDURE — 7100000000 HC PACU RECOVERY - FIRST 15 MIN: Performed by: INTERNAL MEDICINE

## 2025-04-11 PROCEDURE — 7100000030 HC ASPR PHASE II RECOVERY - FIRST 15 MIN: Performed by: INTERNAL MEDICINE

## 2025-04-11 PROCEDURE — 7100000011 HC PHASE II RECOVERY - ADDTL 15 MIN: Performed by: INTERNAL MEDICINE

## 2025-04-11 PROCEDURE — 6360000004 HC RX CONTRAST MEDICATION: Performed by: INTERNAL MEDICINE

## 2025-04-11 PROCEDURE — 6360000002 HC RX W HCPCS: Performed by: NURSE ANESTHETIST, CERTIFIED REGISTERED

## 2025-04-11 PROCEDURE — 2720000010 HC SURG SUPPLY STERILE: Performed by: INTERNAL MEDICINE

## 2025-04-11 RX ORDER — SODIUM CHLORIDE 9 MG/ML
INJECTION, SOLUTION INTRAVENOUS CONTINUOUS
Status: DISCONTINUED | OUTPATIENT
Start: 2025-04-11 | End: 2025-04-11 | Stop reason: HOSPADM

## 2025-04-11 RX ORDER — ONDANSETRON 2 MG/ML
4 INJECTION INTRAMUSCULAR; INTRAVENOUS
Status: DISCONTINUED | OUTPATIENT
Start: 2025-04-11 | End: 2025-04-11 | Stop reason: HOSPADM

## 2025-04-11 RX ORDER — SODIUM CHLORIDE 0.9 % (FLUSH) 0.9 %
5-40 SYRINGE (ML) INJECTION EVERY 12 HOURS SCHEDULED
Status: DISCONTINUED | OUTPATIENT
Start: 2025-04-11 | End: 2025-04-11 | Stop reason: HOSPADM

## 2025-04-11 RX ORDER — SODIUM CHLORIDE 9 MG/ML
INJECTION, SOLUTION INTRAVENOUS PRN
Status: DISCONTINUED | OUTPATIENT
Start: 2025-04-11 | End: 2025-04-11 | Stop reason: HOSPADM

## 2025-04-11 RX ORDER — DIPHENHYDRAMINE HYDROCHLORIDE 50 MG/ML
12.5 INJECTION, SOLUTION INTRAMUSCULAR; INTRAVENOUS
Status: DISCONTINUED | OUTPATIENT
Start: 2025-04-11 | End: 2025-04-11 | Stop reason: HOSPADM

## 2025-04-11 RX ORDER — SODIUM CHLORIDE, SODIUM LACTATE, POTASSIUM CHLORIDE, CALCIUM CHLORIDE 600; 310; 30; 20 MG/100ML; MG/100ML; MG/100ML; MG/100ML
INJECTION, SOLUTION INTRAVENOUS CONTINUOUS
Status: DISCONTINUED | OUTPATIENT
Start: 2025-04-11 | End: 2025-04-11 | Stop reason: HOSPADM

## 2025-04-11 RX ORDER — SUCCINYLCHOLINE/SOD CL,ISO/PF 200MG/10ML
SYRINGE (ML) INTRAVENOUS
Status: DISCONTINUED | OUTPATIENT
Start: 2025-04-11 | End: 2025-04-11 | Stop reason: SDUPTHER

## 2025-04-11 RX ORDER — PROPOFOL 10 MG/ML
INJECTION, EMULSION INTRAVENOUS
Status: DISCONTINUED | OUTPATIENT
Start: 2025-04-11 | End: 2025-04-11 | Stop reason: SDUPTHER

## 2025-04-11 RX ORDER — SODIUM CHLORIDE 0.9 % (FLUSH) 0.9 %
5-40 SYRINGE (ML) INJECTION PRN
Status: DISCONTINUED | OUTPATIENT
Start: 2025-04-11 | End: 2025-04-11 | Stop reason: HOSPADM

## 2025-04-11 RX ORDER — IOPAMIDOL 612 MG/ML
INJECTION, SOLUTION INTRAVASCULAR PRN
Status: DISCONTINUED | OUTPATIENT
Start: 2025-04-11 | End: 2025-04-11 | Stop reason: ALTCHOICE

## 2025-04-11 RX ORDER — FENTANYL CITRATE 50 UG/ML
INJECTION, SOLUTION INTRAMUSCULAR; INTRAVENOUS
Status: DISCONTINUED | OUTPATIENT
Start: 2025-04-11 | End: 2025-04-11 | Stop reason: SDUPTHER

## 2025-04-11 RX ORDER — DEXAMETHASONE SODIUM PHOSPHATE 4 MG/ML
INJECTION, SOLUTION INTRA-ARTICULAR; INTRALESIONAL; INTRAMUSCULAR; INTRAVENOUS; SOFT TISSUE
Status: DISCONTINUED | OUTPATIENT
Start: 2025-04-11 | End: 2025-04-11 | Stop reason: SDUPTHER

## 2025-04-11 RX ORDER — FENTANYL CITRATE 0.05 MG/ML
25 INJECTION, SOLUTION INTRAMUSCULAR; INTRAVENOUS EVERY 5 MIN PRN
Status: DISCONTINUED | OUTPATIENT
Start: 2025-04-11 | End: 2025-04-11 | Stop reason: HOSPADM

## 2025-04-11 RX ORDER — KETOROLAC TROMETHAMINE 30 MG/ML
INJECTION, SOLUTION INTRAMUSCULAR; INTRAVENOUS
Status: DISCONTINUED | OUTPATIENT
Start: 2025-04-11 | End: 2025-04-11 | Stop reason: SDUPTHER

## 2025-04-11 RX ORDER — LIDOCAINE HYDROCHLORIDE 10 MG/ML
1 INJECTION, SOLUTION EPIDURAL; INFILTRATION; INTRACAUDAL; PERINEURAL
Status: DISCONTINUED | OUTPATIENT
Start: 2025-04-11 | End: 2025-04-11 | Stop reason: HOSPADM

## 2025-04-11 RX ORDER — INDOMETHACIN 50 MG/1
SUPPOSITORY RECTAL PRN
Status: DISCONTINUED | OUTPATIENT
Start: 2025-04-11 | End: 2025-04-11 | Stop reason: ALTCHOICE

## 2025-04-11 RX ORDER — ONDANSETRON 2 MG/ML
INJECTION INTRAMUSCULAR; INTRAVENOUS
Status: DISCONTINUED | OUTPATIENT
Start: 2025-04-11 | End: 2025-04-11 | Stop reason: SDUPTHER

## 2025-04-11 RX ADMIN — Medication 100 MG: at 13:01

## 2025-04-11 RX ADMIN — FENTANYL CITRATE 50 MCG: 50 INJECTION INTRAMUSCULAR; INTRAVENOUS at 12:59

## 2025-04-11 RX ADMIN — KETOROLAC TROMETHAMINE 30 MG: 30 INJECTION, SOLUTION INTRAMUSCULAR at 13:23

## 2025-04-11 RX ADMIN — PROPOFOL 160 MG: 10 INJECTION, EMULSION INTRAVENOUS at 13:01

## 2025-04-11 RX ADMIN — ONDANSETRON 4 MG: 2 INJECTION, SOLUTION INTRAMUSCULAR; INTRAVENOUS at 13:23

## 2025-04-11 RX ADMIN — SODIUM CHLORIDE: 900 INJECTION, SOLUTION INTRAVENOUS at 13:00

## 2025-04-11 RX ADMIN — SODIUM CHLORIDE, SODIUM LACTATE, POTASSIUM CHLORIDE, AND CALCIUM CHLORIDE: .6; .31; .03; .02 INJECTION, SOLUTION INTRAVENOUS at 13:55

## 2025-04-11 RX ADMIN — FENTANYL CITRATE 50 MCG: 50 INJECTION INTRAMUSCULAR; INTRAVENOUS at 13:07

## 2025-04-11 RX ADMIN — DEXAMETHASONE SODIUM PHOSPHATE 8 MG: 4 INJECTION INTRA-ARTICULAR; INTRALESIONAL; INTRAMUSCULAR; INTRAVENOUS; SOFT TISSUE at 13:10

## 2025-04-11 ASSESSMENT — PAIN - FUNCTIONAL ASSESSMENT
PAIN_FUNCTIONAL_ASSESSMENT: NONE - DENIES PAIN
PAIN_FUNCTIONAL_ASSESSMENT: NONE - DENIES PAIN

## 2025-04-11 ASSESSMENT — ENCOUNTER SYMPTOMS: SHORTNESS OF BREATH: 1

## 2025-04-11 NOTE — OP NOTE
removal of bile duct stent  Normal cholangiogram    Plan:  Keep NPO for next 4 hours, if no symptoms, then start on clears  Monitor for bleeding, abdominal pain, nausea/ vomiting, abdominal bloating, fever, chills  Lactated Ringers IV @ 250 ml/ hour x 4 hours      Electronically signed by   Santy Gurrola M.D,FACG   on 4/11/2025 at 1:44 PM

## 2025-04-11 NOTE — H&P
HISTORY and PHYSICAL  [facility]       NAME:  Doe Kapadia  MRN: [id number]   YOB: 1940   Date: 4/11/2025   Age: 85 y.o.  Gender: male       COMPLAINT AND PRESENT HISTORY:             Doe Kapadia is 85 y.o., male, undergoing for ERCP ( ENDOSCOPIC RETROGRADE CHOLANGIOPANCREATOGRAPHY)    Pt is being seen for hx of:  Pre-Op Diagnosis Codes:      * Choledocholithiasis     Patient has had previous  ERCP done Dec, 2024.  With stent.     Patient has hx of blockage or narrowing of bile ducts .  Patient has no  hx of pancreatic disease.     Patient denies any abdominal pain.    Patient  denies any changes in bowel habits.  Pt denies  any discoloration in stools.    Patient denies any heartburn, indigestion, acid reflux (GERD).       Pt denies any dysphagia . No  regurgitation.     Pt denies any  nausea or  vomiting.  No changes in appetite. There has been no Wt loss.     Medical history reviewed: CAD, CHF, SOB, PVC,   htn, hld.  Cardiac cath-x2 stents. CABG X2 vessels. DM,blood sugar 256 today.     Denies any recent fever or chills, chest pain /pressure . Pt reports some SOB..     Your nurse has reviewed your medications.   Blood thinner/anticoagulant:  aspirin    Pt denies any issues with Anesthesia.     RECENT LABS, IMAGING AND TESTING     Lab Results   Component Value Date    WBC 6.0 02/27/2025    RBC 3.55 (L) 02/27/2025    HGB 11.7 (L) 02/27/2025    HCT 35.2 (L) 02/27/2025    MCV 99.1 02/27/2025    MCH 32.9 02/27/2025    MCHC 33.2 02/27/2025    RDW 13.9 02/27/2025     02/27/2025    MPV 7.1 02/27/2025        Lab Results   Component Value Date     02/27/2025    K 4.0 02/27/2025     02/27/2025    CO2 23 02/27/2025    BUN 10 02/27/2025    CREATININE 0.9 02/27/2025    GLUCOSE 116 (H) 02/27/2025    CALCIUM 8.6 02/27/2025    BILITOT 0.5 02/26/2025    ALKPHOS 89 02/26/2025    AST 28 02/26/2025    ALT 20 02/26/2025         PAST MEDICAL HISTORY     Past Medical History:

## 2025-04-11 NOTE — ANESTHESIA PRE PROCEDURE
Anesthesia Plan      general     ASA 3     (GETA)  Induction: intravenous.    MIPS: Postoperative opioids intended and Prophylactic antiemetics administered.  Anesthetic plan and risks discussed with patient.      Plan discussed with CRNA.                Marci Conley MD   4/11/2025

## 2025-04-11 NOTE — ANESTHESIA POSTPROCEDURE EVALUATION
Department of Anesthesiology  Postprocedure Note    Patient: Doe Kapadia  MRN: [id number]  YOB: 1940  Date of evaluation: 4/11/2025    Procedure Summary       Date: 04/11/25 Room / Location: [place] [address] / [facility]    Anesthesia Start: 1259 Anesthesia Stop: 1328    Procedure: ENDOSCOPIC RETROGRADE CHOLANGIOPANCREATOGRAPHY WITH BALLON DILATION AND STENT REMOVAL Diagnosis:       Choledocholithiasis      (Choledocholithiasis [K80.50])    Surgeons: Santy Gurrola MD Responsible Provider: Marci Conley MD    Anesthesia Type: General ASA Status: 3            Anesthesia Type: General    Chiquita Phase I: Chiquita Score: 9    Chiquita Phase II: Chiquita Score: 10    Anesthesia Post Evaluation    Comments: POST- ANESTHESIA EVALUATION       Pt Name: Doe Kapadia  MRN: [id number]  YOB: 1940  Date of evaluation: 4/11/2025  Time:  3:25 PM      /67   Pulse 67   Temp 96.9 °F (36.1 °C) (Infrared)   Resp 14   Ht 1.702 m (5' 7\")   Wt 67.1 kg (148 lb)   SpO2 98%   BMI 23.18 kg/m²      Consciousness Level  Awake  Cardiopulmonary Status  Stable  Pain Adequately Treated YES  Nausea / Vomiting  NO  Adequate Hydration  YES  Anesthesia Related Complications NONE      Electronically signed by Marci Conley MD on 4/11/2025 at 3:25 PM           No notable events documented.

## 2025-04-11 NOTE — CARE COORDINATION
Remote Alert Monitoring Note      Date/Time:  2025 10:15 AM  Patient Current Location: Home: [address] [place] [address]    LPN contacted wife and patient by telephone regarding red alert received for blood pressure heart rate (127). Verified patients name and  as identifiers.    Rpm alert to be reviewed by the provider                        Background: CHF     Refer to 911 immediately if:  Patient unresponsive or unable to provide history  Change in cognition or sudden confusion  Patient unable to respond in complete sentences  Intense chest pain/tightness  Any concern for any clinical emergency  Red Alert: Provider response time of 1 hr required for any red alert requiring intervention  Yellow Alert: Provider response time of 3hr required for any escalated yellow alert        Heart Rate HR Triage  Are you having any Chest Pain? no   Are you having any Shortness of Breath? no   Are you having any dizziness? no   Are you feeling more fatigued or tired than normal? no   Are you having any other health concerns or issues? no       Clinical Interventions: Reviewed and followed up on alerts and treatments-. Pt is asymptomatic and in no apparent distress, speaking in full sentences.  Patient states he is feeling fine. They are unable to recheck HR as they are heading out the door at this time as patient is scheduled for an Endoscopy this morning. Will resume monitoring tomorrow. Will forward to NP for review.    **For any new or worsening symptoms or you are concerned in anyway, please contact your Provider or report to the nearest Emergency Room.**    Plan/Follow Up: Will continue to review, monitor and address alerts with follow up based on severity of symptoms and risk factors.    BARB Lombardo [facility]/ CTN/ Remote Patient monitoring  [phone]

## 2025-04-11 NOTE — DISCHARGE INSTRUCTIONS
DISCHARGE INSTRUCTIONS FOR GENERAL ANESTHESIA    In order to continue your care at home, please follow the instructions below.    Anesthesia - Do not drink any alcoholic beverages or make any legal or important decisions for 24 hours. If your surgery was on an extremity or abdomen, do not drive or operate any machinery until your surgeon approves, otherwise do not drive or operate any machinery for 24 hours or as restricted by your surgeon.     Pain Medications - Please do not drive, operate machinery, or drink alcoholic beverages while taking any prescribed pain medication.     Diet -   Do not eat or drink anything until 5:30. If no symptoms, then start on clear liquids.      Call your surgeon for the following:    Monitor for bleeding, abdominal pain, nausea/ vomiting, abdominal bloating, fever, chills    Persistent nausea or vomiting and can’t keep fluids down.  If you are unable to urinate within 8 hours of surgery.  Redness or swelling at IV site.  For any questions or concerns you may have.         
unknown

## 2025-04-11 NOTE — TELEPHONE ENCOUNTER
MULTIVITAMIN-MINERALS) tablet Take 1 tablet by mouth daily 30 tablet 5    aspirin 81 MG chewable tablet Take 1 tablet by mouth every morning         ALLERGIES    No Known Allergies    Ana Luisa Bender DNP MSN FNP-C; Remote Patient Monitoring NP; Ph: [phone]

## 2025-04-14 ENCOUNTER — APPOINTMENT (OUTPATIENT)
Dept: GENERAL RADIOLOGY | Age: 85
End: 2025-04-14
Payer: MEDICARE

## 2025-04-14 ENCOUNTER — CARE COORDINATION (OUTPATIENT)
Dept: CASE MANAGEMENT | Age: 85
End: 2025-04-14

## 2025-04-14 ENCOUNTER — HOSPITAL ENCOUNTER (OUTPATIENT)
Age: 85
Setting detail: OBSERVATION
LOS: 1 days | Discharge: HOME | End: 2025-04-15
Attending: EMERGENCY MEDICINE | Admitting: STUDENT IN AN ORGANIZED HEALTH CARE EDUCATION/TRAINING PROGRAM
Payer: MEDICARE

## 2025-04-14 ENCOUNTER — TELEPHONE (OUTPATIENT)
Dept: PRIMARY CARE CLINIC | Age: 85
End: 2025-04-14

## 2025-04-14 ENCOUNTER — CARE COORDINATION (OUTPATIENT)
Dept: CARE COORDINATION | Age: 85
End: 2025-04-14

## 2025-04-14 DIAGNOSIS — R00.0 TACHYCARDIA: Primary | ICD-10-CM

## 2025-04-14 DIAGNOSIS — R31.9 HEMATURIA, UNSPECIFIED TYPE: ICD-10-CM

## 2025-04-14 DIAGNOSIS — N17.9 AKI (ACUTE KIDNEY INJURY): ICD-10-CM

## 2025-04-14 LAB
ABSOLUTE BANDS: 0.49 K/UL (ref 0–1)
ALBUMIN SERPL-MCNC: 3.9 G/DL (ref 3.5–5.2)
ALP SERPL-CCNC: 110 U/L (ref 40–129)
ALT SERPL-CCNC: 74 U/L (ref 10–50)
ANION GAP SERPL CALCULATED.3IONS-SCNC: 19 MMOL/L (ref 9–16)
AST SERPL-CCNC: 51 U/L (ref 10–50)
BACTERIA URNS QL MICRO: ABNORMAL
BANDS: 5 % (ref 0–10)
BASOPHILS # BLD: 0 K/UL (ref 0–0.2)
BASOPHILS NFR BLD: 0 % (ref 0–2)
BILIRUB SERPL-MCNC: 0.9 MG/DL (ref 0–1.2)
BILIRUB UR QL STRIP: NEGATIVE
BUN SERPL-MCNC: 23 MG/DL (ref 8–23)
CALCIUM SERPL-MCNC: 9.7 MG/DL (ref 8.6–10.4)
CASTS #/AREA URNS LPF: ABNORMAL /LPF
CHLORIDE SERPL-SCNC: 95 MMOL/L (ref 98–107)
CHP ED QC CHECK: NORMAL
CLARITY UR: CLEAR
CO2 SERPL-SCNC: 19 MMOL/L (ref 20–31)
COLOR UR: YELLOW
CREAT SERPL-MCNC: 1.5 MG/DL (ref 0.7–1.2)
EOSINOPHIL # BLD: 0 K/UL (ref 0–0.4)
EOSINOPHILS RELATIVE PERCENT: 0 % (ref 0–4)
EPI CELLS #/AREA URNS HPF: ABNORMAL /HPF
ERYTHROCYTE [DISTWIDTH] IN BLOOD BY AUTOMATED COUNT: 13.5 % (ref 11.5–14.9)
GFR, ESTIMATED: 46 ML/MIN/1.73M2
GLUCOSE BLD-MCNC: 309 MG/DL (ref 75–110)
GLUCOSE BLD-MCNC: 333 MG/DL (ref 75–110)
GLUCOSE SERPL-MCNC: 374 MG/DL (ref 74–99)
GLUCOSE UR STRIP-MCNC: ABNORMAL MG/DL
HCT VFR BLD AUTO: 38.4 % (ref 41–53)
HGB BLD-MCNC: 12.8 G/DL (ref 13.5–17.5)
HGB UR QL STRIP.AUTO: ABNORMAL
KETONES UR STRIP-MCNC: ABNORMAL MG/DL
LEUKOCYTE ESTERASE UR QL STRIP: NEGATIVE
LYMPHOCYTES NFR BLD: 0.49 K/UL (ref 1–4.8)
LYMPHOCYTES RELATIVE PERCENT: 5 % (ref 24–44)
MCH RBC QN AUTO: 30.8 PG (ref 26–34)
MCHC RBC AUTO-ENTMCNC: 33.4 G/DL (ref 31–37)
MCV RBC AUTO: 92 FL (ref 80–100)
MONOCYTES NFR BLD: 0.68 K/UL (ref 0.1–1.3)
MONOCYTES NFR BLD: 7 % (ref 1–7)
MORPHOLOGY: NORMAL
NEUTROPHILS NFR BLD: 83 % (ref 36–66)
NEUTS SEG NFR BLD: 8.04 K/UL (ref 1.3–9.1)
NITRITE UR QL STRIP: NEGATIVE
PH UR STRIP: 5.5 [PH] (ref 5–8)
PLATELET # BLD AUTO: 177 K/UL (ref 150–450)
PMV BLD AUTO: 7.3 FL (ref 6–12)
POTASSIUM SERPL-SCNC: 4.7 MMOL/L (ref 3.7–5.3)
PROT SERPL-MCNC: 7.4 G/DL (ref 6.6–8.7)
PROT UR STRIP-MCNC: ABNORMAL MG/DL
RBC # BLD AUTO: 4.17 M/UL (ref 4.5–5.9)
RBC #/AREA URNS HPF: ABNORMAL /HPF
SODIUM SERPL-SCNC: 133 MMOL/L (ref 136–145)
SP GR UR STRIP: 1.02 (ref 1–1.03)
T4 FREE SERPL-MCNC: 1.6 NG/DL (ref 0.9–1.7)
TROPONIN I SERPL HS-MCNC: 61 NG/L (ref 0–22)
TROPONIN I SERPL HS-MCNC: 66 NG/L (ref 0–22)
TSH SERPL DL<=0.05 MIU/L-ACNC: 3.56 UIU/ML (ref 0.27–4.2)
UROBILINOGEN UR STRIP-ACNC: NORMAL EU/DL (ref 0–1)
WBC #/AREA URNS HPF: ABNORMAL /HPF
WBC OTHER # BLD: 9.7 K/UL (ref 3.5–11)

## 2025-04-14 PROCEDURE — 71046 X-RAY EXAM CHEST 2 VIEWS: CPT

## 2025-04-14 PROCEDURE — 84481 FREE ASSAY (FT-3): CPT

## 2025-04-14 PROCEDURE — 2580000003 HC RX 258

## 2025-04-14 PROCEDURE — 99285 EMERGENCY DEPT VISIT HI MDM: CPT

## 2025-04-14 PROCEDURE — 84439 ASSAY OF FREE THYROXINE: CPT

## 2025-04-14 PROCEDURE — 82947 ASSAY GLUCOSE BLOOD QUANT: CPT

## 2025-04-14 PROCEDURE — 84443 ASSAY THYROID STIM HORMONE: CPT

## 2025-04-14 PROCEDURE — 81001 URINALYSIS AUTO W/SCOPE: CPT

## 2025-04-14 PROCEDURE — 36415 COLL VENOUS BLD VENIPUNCTURE: CPT

## 2025-04-14 PROCEDURE — 1200000000 HC SEMI PRIVATE

## 2025-04-14 PROCEDURE — 84484 ASSAY OF TROPONIN QUANT: CPT

## 2025-04-14 PROCEDURE — 6370000000 HC RX 637 (ALT 250 FOR IP): Performed by: STUDENT IN AN ORGANIZED HEALTH CARE EDUCATION/TRAINING PROGRAM

## 2025-04-14 PROCEDURE — 80053 COMPREHEN METABOLIC PANEL: CPT

## 2025-04-14 PROCEDURE — 93005 ELECTROCARDIOGRAM TRACING: CPT

## 2025-04-14 PROCEDURE — 85025 COMPLETE CBC W/AUTO DIFF WBC: CPT

## 2025-04-14 PROCEDURE — 96365 THER/PROPH/DIAG IV INF INIT: CPT

## 2025-04-14 PROCEDURE — 6360000002 HC RX W HCPCS

## 2025-04-14 RX ORDER — INSULIN GLARGINE 100 [IU]/ML
12 INJECTION, SOLUTION SUBCUTANEOUS NIGHTLY
Status: DISCONTINUED | OUTPATIENT
Start: 2025-04-14 | End: 2025-04-15 | Stop reason: HOSPADM

## 2025-04-14 RX ORDER — SODIUM CHLORIDE 0.9 % (FLUSH) 0.9 %
5-40 SYRINGE (ML) INJECTION EVERY 12 HOURS SCHEDULED
Status: DISCONTINUED | OUTPATIENT
Start: 2025-04-14 | End: 2025-04-15 | Stop reason: HOSPADM

## 2025-04-14 RX ORDER — POTASSIUM CHLORIDE 7.45 MG/ML
10 INJECTION INTRAVENOUS PRN
Status: DISCONTINUED | OUTPATIENT
Start: 2025-04-14 | End: 2025-04-15 | Stop reason: HOSPADM

## 2025-04-14 RX ORDER — POTASSIUM CHLORIDE 1500 MG/1
40 TABLET, EXTENDED RELEASE ORAL PRN
Status: DISCONTINUED | OUTPATIENT
Start: 2025-04-14 | End: 2025-04-15 | Stop reason: HOSPADM

## 2025-04-14 RX ORDER — ONDANSETRON 2 MG/ML
4 INJECTION INTRAMUSCULAR; INTRAVENOUS EVERY 6 HOURS PRN
Status: DISCONTINUED | OUTPATIENT
Start: 2025-04-14 | End: 2025-04-14

## 2025-04-14 RX ORDER — INSULIN LISPRO 100 [IU]/ML
0-8 INJECTION, SOLUTION INTRAVENOUS; SUBCUTANEOUS
Status: DISCONTINUED | OUTPATIENT
Start: 2025-04-14 | End: 2025-04-15 | Stop reason: HOSPADM

## 2025-04-14 RX ORDER — BISACODYL 5 MG/1
5 TABLET, DELAYED RELEASE ORAL DAILY PRN
Status: DISCONTINUED | OUTPATIENT
Start: 2025-04-14 | End: 2025-04-15 | Stop reason: HOSPADM

## 2025-04-14 RX ORDER — SODIUM CHLORIDE 0.9 % (FLUSH) 0.9 %
5-40 SYRINGE (ML) INJECTION PRN
Status: DISCONTINUED | OUTPATIENT
Start: 2025-04-14 | End: 2025-04-15 | Stop reason: HOSPADM

## 2025-04-14 RX ORDER — LOSARTAN POTASSIUM 50 MG/1
50 TABLET ORAL DAILY
Status: DISCONTINUED | OUTPATIENT
Start: 2025-04-15 | End: 2025-04-15 | Stop reason: HOSPADM

## 2025-04-14 RX ORDER — MAGNESIUM SULFATE 1 G/100ML
1000 INJECTION INTRAVENOUS ONCE
Status: COMPLETED | OUTPATIENT
Start: 2025-04-14 | End: 2025-04-14

## 2025-04-14 RX ORDER — DEXTROSE MONOHYDRATE 100 MG/ML
INJECTION, SOLUTION INTRAVENOUS CONTINUOUS PRN
Status: DISCONTINUED | OUTPATIENT
Start: 2025-04-14 | End: 2025-04-15 | Stop reason: HOSPADM

## 2025-04-14 RX ORDER — ROSUVASTATIN CALCIUM 20 MG/1
20 TABLET, COATED ORAL DAILY
Status: DISCONTINUED | OUTPATIENT
Start: 2025-04-15 | End: 2025-04-15 | Stop reason: HOSPADM

## 2025-04-14 RX ORDER — MEMANTINE HYDROCHLORIDE 10 MG/1
10 TABLET ORAL 2 TIMES DAILY
Status: DISCONTINUED | OUTPATIENT
Start: 2025-04-14 | End: 2025-04-15 | Stop reason: HOSPADM

## 2025-04-14 RX ORDER — MAGNESIUM OXIDE 400 MG/1
400 TABLET ORAL 2 TIMES DAILY
Status: DISCONTINUED | OUTPATIENT
Start: 2025-04-14 | End: 2025-04-14 | Stop reason: CLARIF

## 2025-04-14 RX ORDER — METOPROLOL SUCCINATE 25 MG/1
25 TABLET, EXTENDED RELEASE ORAL DAILY
Status: DISCONTINUED | OUTPATIENT
Start: 2025-04-15 | End: 2025-04-15 | Stop reason: HOSPADM

## 2025-04-14 RX ORDER — METOPROLOL TARTRATE 1 MG/ML
5 INJECTION, SOLUTION INTRAVENOUS EVERY 6 HOURS PRN
Status: DISCONTINUED | OUTPATIENT
Start: 2025-04-14 | End: 2025-04-15 | Stop reason: HOSPADM

## 2025-04-14 RX ORDER — ONDANSETRON 4 MG/1
4 TABLET, ORALLY DISINTEGRATING ORAL EVERY 8 HOURS PRN
Status: DISCONTINUED | OUTPATIENT
Start: 2025-04-14 | End: 2025-04-14

## 2025-04-14 RX ORDER — ACETAMINOPHEN 325 MG/1
650 TABLET ORAL EVERY 6 HOURS PRN
Status: DISCONTINUED | OUTPATIENT
Start: 2025-04-14 | End: 2025-04-15 | Stop reason: HOSPADM

## 2025-04-14 RX ORDER — LEVOTHYROXINE SODIUM 88 UG/1
88 TABLET ORAL DAILY
Status: DISCONTINUED | OUTPATIENT
Start: 2025-04-15 | End: 2025-04-15 | Stop reason: HOSPADM

## 2025-04-14 RX ORDER — ENOXAPARIN SODIUM 100 MG/ML
40 INJECTION SUBCUTANEOUS DAILY
Status: DISCONTINUED | OUTPATIENT
Start: 2025-04-14 | End: 2025-04-15 | Stop reason: HOSPADM

## 2025-04-14 RX ORDER — 0.9 % SODIUM CHLORIDE 0.9 %
500 INTRAVENOUS SOLUTION INTRAVENOUS ONCE
Status: COMPLETED | OUTPATIENT
Start: 2025-04-14 | End: 2025-04-14

## 2025-04-14 RX ORDER — LANOLIN ALCOHOL/MO/W.PET/CERES
400 CREAM (GRAM) TOPICAL 2 TIMES DAILY
Status: DISCONTINUED | OUTPATIENT
Start: 2025-04-14 | End: 2025-04-15 | Stop reason: HOSPADM

## 2025-04-14 RX ORDER — ACETAMINOPHEN 650 MG/1
650 SUPPOSITORY RECTAL EVERY 6 HOURS PRN
Status: DISCONTINUED | OUTPATIENT
Start: 2025-04-14 | End: 2025-04-15 | Stop reason: HOSPADM

## 2025-04-14 RX ORDER — ASPIRIN 81 MG/1
81 TABLET, CHEWABLE ORAL EVERY MORNING
Status: DISCONTINUED | OUTPATIENT
Start: 2025-04-15 | End: 2025-04-15 | Stop reason: HOSPADM

## 2025-04-14 RX ORDER — SODIUM CHLORIDE 9 MG/ML
INJECTION, SOLUTION INTRAVENOUS CONTINUOUS
Status: DISCONTINUED | OUTPATIENT
Start: 2025-04-14 | End: 2025-04-15 | Stop reason: HOSPADM

## 2025-04-14 RX ADMIN — INSULIN LISPRO 6 UNITS: 100 INJECTION, SOLUTION INTRAVENOUS; SUBCUTANEOUS at 21:43

## 2025-04-14 RX ADMIN — MAGNESIUM SULFATE 1000 MG: 1 INJECTION INTRAVENOUS at 16:46

## 2025-04-14 RX ADMIN — Medication 400 MG: at 21:43

## 2025-04-14 RX ADMIN — SODIUM CHLORIDE: 0.9 INJECTION, SOLUTION INTRAVENOUS at 20:02

## 2025-04-14 RX ADMIN — ENOXAPARIN SODIUM 40 MG: 100 INJECTION SUBCUTANEOUS at 20:02

## 2025-04-14 RX ADMIN — INSULIN GLARGINE 12 UNITS: 100 INJECTION, SOLUTION SUBCUTANEOUS at 21:43

## 2025-04-14 RX ADMIN — SODIUM CHLORIDE 500 ML: 0.9 INJECTION, SOLUTION INTRAVENOUS at 15:42

## 2025-04-14 RX ADMIN — MEMANTINE 10 MG: 10 TABLET ORAL at 21:43

## 2025-04-14 ASSESSMENT — PAIN - FUNCTIONAL ASSESSMENT
PAIN_FUNCTIONAL_ASSESSMENT: NONE - DENIES PAIN
PAIN_FUNCTIONAL_ASSESSMENT: NONE - DENIES PAIN

## 2025-04-14 NOTE — TELEPHONE ENCOUNTER
04/14/25 2:28 PM     REMOTE PATIENT MONITORING HIGH ALERT RESPONSE         ASSESSMENT AND PLAN   Tachycardia  --advised pt to call 911 and go to ED for the following: chest pain/pressure/tightness, new or worsening SOB, sudden loss of use of an arm or leg, sudden numbness or tingling--especially unilateral, confusion, sudden change in vision, facial droop, slurred speech, fainting spell or any other serious or worsening symptoms. Patient agreeable to this plan.      CHIEF COMPLAINT    Responding to a high RPM alert for elevated HR in the 120s. Per RPM LPN's note, pt had diarrhea through the night and has not had good oral intake today.    HPI    Doe Kapadia is a 85 y.o. male with above elevated HR alert. Has had one BP reading in low 100s but most recent was 134/68 after getting up to [place]. HR increased to 130s this afternoon and most recent after trip to  was 140. Pt has reported more fatigue than usual earlier today. Spoke mainly with his wife Corine on this call and she reports MARSH that is at his baseline, denies fever/CP/vomiting/tarry stools or rectal bleeding. Does report ongoing small amounts of diarrhea and frequent urge to defecate with no results. Denies anyone else in home having similar sxs. States she is trying to get him to drink more fluids and this is improving somewhat. Advised pt be evaluated in ED and to go by ambulance. Mrs. Kapadia is agreeable to this plan.    ALLERGIES    No Known Allergies    Tamara Rg DNP, FNP-C, Remote Patient Monitoring NP, (Ph) [phone]

## 2025-04-14 NOTE — ED PROVIDER NOTES
[facility] MED SURG  Emergency Department Encounter  Emergency Medicine Resident     Pt Name:Doe Kapadia  MRN: [id number]  Birthdate 1940  Date of evaluation: 4/14/25  PCP:  Zulma Hill MD  Note Started: 3:15 PM EDT      CHIEF COMPLAINT       Chief Complaint   Patient presents with    Tachycardia     Pt offers no complaints, home nurse noted HR 140s and advised pt to come in       HISTORY OF PRESENT ILLNESS  (Location/Symptom, Timing/Onset, Context/Setting, Quality, Duration, Modifying Factors, Severity.)      Doe Kapadia is a 85 y.o. male brought in by his wife for evaluation of tachycardia.  Wife takes his vitals daily and upon sending them today to the PCP they were concerned about his heart rate being in the 140s, recommended he come to the ER.  Patient has had small amounts of diarrhea over the last day, wife is concerned that he has not been staying hydrated appropriately. He denies fever, chills, nausea, vomiting, abdominal pain, chest pain, shortness of breath.  He denies any dizziness or lightheadedness but wife does note that he has been somewhat off balance while using his walker today.    PAST MEDICAL / SURGICAL / SOCIAL / FAMILY HISTORY      has a past medical history of Arthritis, CAD (coronary artery disease), CHF (congestive heart failure) (HCC), Diabetes mellitus (HCC), History of blood transfusion, Hyperlipidemia, Hypertension, PVC (premature ventricular contraction), SOB (shortness of breath), and Thyroid disease.     has a past surgical history that includes Colonoscopy (10/05/2011); Colonoscopy (03/07/2006); Colonoscopy (11/14/2001); Cardiac surgery; joint replacement (Left); eye surgery; Coronary angioplasty with stent (05/06/2022); ERCP (N/A, 12/23/2024); Coronary artery bypass graft; Multiple tooth extractions; and ERCP (N/A, 4/11/2025).    Social History     Socioeconomic History    Marital status:      Spouse name: Not on file    Number of children: Not on file

## 2025-04-14 NOTE — CARE COORDINATION
-Remote Alert Monitoring Note      Date/Time:  2025 12:15 PM  Patient Current Location: Home: [address] [place] [address]  Verified patients name and  as identifiers.    Rpm alert to be reviewed by the provider   red alert  blood pressure heart rate (128) and pulse ox heart rate (128)  Vitals Recheck blood pressure heart rate (133) and pulse ox heart rate (130)  Additional needs to be addressed by provider:  Spoke to patient and wife, he denies chest pain, , dizziness  has SOB with walking , states he is tired today. Patient has had diarrhea since last evening, wife has been trying to get patient to drink fluids today he has not taken in much. All medications have been taken and no other concerns at present time                    LPN contacted patient by telephone regarding red alert received   Background: CHF  Refer to 911 immediately if:  Patient unresponsive or unable to provide history  Change in cognition or sudden confusion  Patient unable to respond in complete sentences  Intense chest pain/tightness  Any concern for any clinical emergency  Red Alert: Provider response time of 1 hr required for any red alert requiring intervention  Yellow Alert: Provider response time of 3hr required for any escalated yellow alert  Patient Chief Complaint:  Heart Rate HR Triage  Are you having any Chest Pain? no   Are you having any Shortness of Breath? no   Are you having any dizziness? no   Are you feeling more fatigued or tired than normal? no   Are you having any other health concerns or issues? no     Clinical Interventions: Spoke to patient and wife, he denies chest pain, , dizziness has SOB with walking , states he is tired today. Patient has had diarrhea since last evening, wife has been trying to get patient to drink fluids today he has not taken in much. All medications have been taken and no other concerns at present time      Plan/Follow Up: Will continue to review, monitor and address alerts with

## 2025-04-14 NOTE — ED PROVIDER NOTES
[facility] EMERGENCY DEPARTMENT  eMERGENCY dEPARTMENT eNCOUnter   Attending Attestation     Pt Name: Doe Kapadia  MRN: [id number]  Birthdate 1940  Date of evaluation: 4/14/25       Doe Kapadia is a 85 y.o. male who presents with Tachycardia (Pt offers no complaints, home nurse noted HR 140s and advised pt to come in)      History:   Patient is here because his blood pressure was on the little bit on the low side and heart rate was high at home today.  Patient had some diarrhea and has not been eating and drinking very well.  Patient states he has a little bit of abdominal pain.  Patient had no fevers.    Exam: Vitals:   Vitals:    04/14/25 1520 04/14/25 1522   BP: 113/89    Pulse: (!) 124    Resp: 18    Temp:  97.3 °F (36.3 °C)   TempSrc:  Oral   SpO2: 99%    Weight: 67.6 kg (149 lb)    Height: 1.702 m (5' 7\")      Heart tachycardic regular no murmurs.  Lungs clear to auscultation bilaterally.  Abdomen is distended but soft with some tenderness to palpation in the right lower quadrant.    I performed a history and physical examination of the patient and discussed management with the resident. I reviewed the resident’s note and agree with the documented findings and plan of care. Any areas of disagreement are noted on the chart. I was personally present for the key portions of any procedures. I have documented in the chart those procedures where I was not present during the key portions. I have personally reviewed all images and agree with the resident's interpretation. I have reviewed the emergency nurses triage note. I agree with the chief complaint, past medical history, past surgical history, allergies, medications, social and family history as documented unless otherwise noted below. Documentation of the HPI, Physical Exam and Medical Decision Making performed by medical students or scribes is based on my personal performance of the HPI, PE and MDM. I personally evaluated and examined the

## 2025-04-15 ENCOUNTER — CARE COORDINATION (OUTPATIENT)
Dept: CARE COORDINATION | Age: 85
End: 2025-04-15

## 2025-04-15 VITALS
RESPIRATION RATE: 16 BRPM | BODY MASS INDEX: 23.39 KG/M2 | HEIGHT: 67 IN | HEART RATE: 75 BPM | SYSTOLIC BLOOD PRESSURE: 104 MMHG | OXYGEN SATURATION: 98 % | WEIGHT: 149 LBS | TEMPERATURE: 98.4 F | DIASTOLIC BLOOD PRESSURE: 50 MMHG

## 2025-04-15 PROBLEM — R31.9 HEMATURIA: Status: ACTIVE | Noted: 2025-04-15

## 2025-04-15 PROBLEM — R19.7 DIARRHEA: Status: ACTIVE | Noted: 2025-04-15

## 2025-04-15 PROBLEM — R00.0 TACHYCARDIA: Status: ACTIVE | Noted: 2025-04-15

## 2025-04-15 LAB
ANION GAP SERPL CALCULATED.3IONS-SCNC: 13 MMOL/L (ref 9–16)
BASOPHILS # BLD: 0 K/UL (ref 0–0.2)
BASOPHILS NFR BLD: 0 % (ref 0–2)
BUN SERPL-MCNC: 19 MG/DL (ref 8–23)
CALCIUM SERPL-MCNC: 8.8 MG/DL (ref 8.6–10.4)
CHLORIDE SERPL-SCNC: 104 MMOL/L (ref 98–107)
CO2 SERPL-SCNC: 21 MMOL/L (ref 20–31)
CREAT SERPL-MCNC: 1.2 MG/DL (ref 0.7–1.2)
EOSINOPHIL # BLD: 0 K/UL (ref 0–0.4)
EOSINOPHILS RELATIVE PERCENT: 0 % (ref 0–4)
ERYTHROCYTE [DISTWIDTH] IN BLOOD BY AUTOMATED COUNT: 13.8 % (ref 11.5–14.9)
GFR, ESTIMATED: 60 ML/MIN/1.73M2
GLUCOSE BLD-MCNC: 102 MG/DL (ref 75–110)
GLUCOSE BLD-MCNC: 118 MG/DL (ref 75–110)
GLUCOSE BLD-MCNC: 165 MG/DL (ref 75–110)
GLUCOSE BLD-MCNC: 265 MG/DL (ref 75–110)
GLUCOSE SERPL-MCNC: 94 MG/DL (ref 74–99)
HCT VFR BLD AUTO: 36.4 % (ref 41–53)
HGB BLD-MCNC: 12 G/DL (ref 13.5–17.5)
LYMPHOCYTES NFR BLD: 1.1 K/UL (ref 1–4.8)
LYMPHOCYTES RELATIVE PERCENT: 14 % (ref 24–44)
MCH RBC QN AUTO: 31.4 PG (ref 26–34)
MCHC RBC AUTO-ENTMCNC: 33.1 G/DL (ref 31–37)
MCV RBC AUTO: 94.7 FL (ref 80–100)
MONOCYTES NFR BLD: 0.7 K/UL (ref 0.1–1.3)
MONOCYTES NFR BLD: 9 % (ref 1–7)
NEUTROPHILS NFR BLD: 77 % (ref 36–66)
NEUTS SEG NFR BLD: 6.4 K/UL (ref 1.3–9.1)
PLATELET # BLD AUTO: 155 K/UL (ref 150–450)
PMV BLD AUTO: 7.9 FL (ref 6–12)
POTASSIUM SERPL-SCNC: 4 MMOL/L (ref 3.7–5.3)
RBC # BLD AUTO: 3.84 M/UL (ref 4.5–5.9)
SODIUM SERPL-SCNC: 138 MMOL/L (ref 136–145)
T3FREE SERPL-MCNC: 1.74 PG/ML (ref 2–4.4)
WBC OTHER # BLD: 8.3 K/UL (ref 3.5–11)

## 2025-04-15 PROCEDURE — 99223 1ST HOSP IP/OBS HIGH 75: CPT | Performed by: STUDENT IN AN ORGANIZED HEALTH CARE EDUCATION/TRAINING PROGRAM

## 2025-04-15 PROCEDURE — G0378 HOSPITAL OBSERVATION PER HR: HCPCS

## 2025-04-15 PROCEDURE — 36415 COLL VENOUS BLD VENIPUNCTURE: CPT

## 2025-04-15 PROCEDURE — 99223 1ST HOSP IP/OBS HIGH 75: CPT | Performed by: NURSE PRACTITIONER

## 2025-04-15 PROCEDURE — 97535 SELF CARE MNGMENT TRAINING: CPT

## 2025-04-15 PROCEDURE — 97116 GAIT TRAINING THERAPY: CPT

## 2025-04-15 PROCEDURE — 97166 OT EVAL MOD COMPLEX 45 MIN: CPT

## 2025-04-15 PROCEDURE — 82947 ASSAY GLUCOSE BLOOD QUANT: CPT

## 2025-04-15 PROCEDURE — 80048 BASIC METABOLIC PNL TOTAL CA: CPT

## 2025-04-15 PROCEDURE — 6370000000 HC RX 637 (ALT 250 FOR IP): Performed by: STUDENT IN AN ORGANIZED HEALTH CARE EDUCATION/TRAINING PROGRAM

## 2025-04-15 PROCEDURE — 6360000002 HC RX W HCPCS: Performed by: STUDENT IN AN ORGANIZED HEALTH CARE EDUCATION/TRAINING PROGRAM

## 2025-04-15 PROCEDURE — 97162 PT EVAL MOD COMPLEX 30 MIN: CPT

## 2025-04-15 PROCEDURE — 2580000003 HC RX 258

## 2025-04-15 PROCEDURE — 85025 COMPLETE CBC W/AUTO DIFF WBC: CPT

## 2025-04-15 RX ADMIN — INSULIN LISPRO 4 UNITS: 100 INJECTION, SOLUTION INTRAVENOUS; SUBCUTANEOUS at 16:37

## 2025-04-15 RX ADMIN — ROSUVASTATIN 20 MG: 20 TABLET, FILM COATED ORAL at 08:26

## 2025-04-15 RX ADMIN — METOPROLOL SUCCINATE 25 MG: 25 TABLET, EXTENDED RELEASE ORAL at 08:26

## 2025-04-15 RX ADMIN — LEVOTHYROXINE SODIUM 88 MCG: 0.09 TABLET ORAL at 05:57

## 2025-04-15 RX ADMIN — SODIUM CHLORIDE: 0.9 INJECTION, SOLUTION INTRAVENOUS at 12:23

## 2025-04-15 RX ADMIN — Medication 400 MG: at 08:26

## 2025-04-15 RX ADMIN — LOSARTAN POTASSIUM 50 MG: 50 TABLET, FILM COATED ORAL at 08:26

## 2025-04-15 RX ADMIN — ACETAMINOPHEN 650 MG: 325 TABLET ORAL at 09:40

## 2025-04-15 RX ADMIN — MEMANTINE 10 MG: 10 TABLET ORAL at 08:26

## 2025-04-15 RX ADMIN — ENOXAPARIN SODIUM 40 MG: 100 INJECTION SUBCUTANEOUS at 08:26

## 2025-04-15 RX ADMIN — ASPIRIN 81 MG: 81 TABLET, CHEWABLE ORAL at 08:26

## 2025-04-15 ASSESSMENT — ENCOUNTER SYMPTOMS
ABDOMINAL PAIN: 0
SHORTNESS OF BREATH: 0
NAUSEA: 0
DIARRHEA: 1
VOMITING: 0

## 2025-04-15 ASSESSMENT — PAIN SCALES - GENERAL: PAINLEVEL_OUTOF10: 3

## 2025-04-15 NOTE — H&P
[facility]  Family Medicine      History & Physical              Date:   4/15/2025  Patient name:  Doe Kapadia  Date of admission:  4/14/2025  3:14 PM  MRN:   [id number]  YOB: 1940    CHIEF COMPLAINT:       Chief Complaint   Patient presents with    Tachycardia     Pt offers no complaints, home nurse noted HR 140s and advised pt to come in         ASSESSMENT/PLAN       Hospital Problems           Last Modified POA    * (Principal) JANIYA (acute kidney injury) 4/14/2025 Yes    Type 2 diabetes mellitus without complication, with long-term current use of insulin 4/15/2025 Yes    Acquired hypothyroidism 4/14/2025 Yes    Diarrhea 4/15/2025 Yes    Tachycardia 4/15/2025 Yes         Tachycardia and JANIYA likely 2/2 to dehydration from diarrhea  Lantus 12 units with sliding scale  thyroid labs, T3 slightly low  Gentle hydration, hold diuretics  Cardio OK with outpatient follow up  Continue namenda  +hemoglobin in urine, no bacteria, repeat outpatient     Above plan discussed with the patient and spouse at bedside    Consultations:   Consults: IP CONSULT TO INTERNAL MEDICINE  IP CONSULT TO CARDIOLOGY  IP CONSULT TO SOCIAL WORK      HPI:       History Obtained From:  Patient and chart review.    The patient is a 85 y.o.  male who presented with tachycardia detected by nurse on outpatient monitoring equipment with HR in the 140s. Hx of HFrEF, dementia, T2DM. Pt has been having some diarrhea but is otherwise asymptomatic. In the ER HR was elevated, creatinine 1.5, up from baseline of 0.9.     The case was discussed with the ER physician, the decision was made to observe the patient and treat with gentle hydration and get cardio consult.     The patient was seen and examined at bedside, wide was present at bedside. He feels well and has no complaints. All relevant notes, labs & imaging were reviewed.  The case was discussed with nursing staff.     PAST MEDICAL HISTORY:        has a past medical history of

## 2025-04-15 NOTE — ACP (ADVANCE CARE PLANNING)
Advance Care Planning     Advance Care Planning Activator (Inpatient)  Conversation Note      Date of ACP Conversation: 4/15/2025     Conversation Conducted with: Patient with Decision Making Capacity    ACP Activator: Sia Louie RN    Health Care Decision Maker:     Current Designated Health Care Decision Maker:     Primary Decision Maker: Corine Kapadia - [facility] - [phone]  Click here to complete Healthcare Decision Makers including section of the Healthcare Decision Maker Relationship (ie \"Primary\")  Today we documented Decision Maker(s) consistent with Legal Next of Kin hierarchy.    Care Preferences    Ventilation:  \"If you were in your present state of health and suddenly became very ill and were unable to breathe on your own, what would your preference be about the use of a ventilator (breathing machine) if it were available to you?\"      Would the patient desire the use of ventilator (breathing machine)?: yes    \"If your health worsens and it becomes clear that your chance of recovery is unlikely, what would your preference be about the use of a ventilator (breathing machine) if it were available to you?\"     Would the patient desire the use of ventilator (breathing machine)?: No      Resuscitation  \"CPR works best to restart the heart when there is a sudden event, like a heart attack, in someone who is otherwise healthy. Unfortunately, CPR does not typically restart the heart for people who have serious health conditions or who are very sick.\"    \"In the event your heart stopped as a result of an underlying serious health condition, would you want attempts to be made to restart your heart (answer \"yes\" for attempt to resuscitate) or would you prefer a natural death (answer \"no\" for do not attempt to resuscitate)?\" yes       [] Yes   [x] No   Educated Patient / Decision Maker regarding differences between Advance Directives and portable DNR orders.    Length of ACP Conversation in minutes:  
Awake/Alert/Cooperative

## 2025-04-15 NOTE — CONSULTS
[facility]    Consult                        Today's Date: 4/15/2025  Patient Name: Doe Kapadia  Date of admission: 4/14/2025  3:14 PM  Patient's age: 85 y.o., 1940  Admission Dx: Tachycardia [R00.0]  JANIYA (acute kidney injury) [N17.9]    Reason for Consult:  Cardiac evaluation    Requesting Physician: Shayne Haney MD    CHIEF COMPLAINT:  Tachycardia    History Obtained From:  patient, electronic medical record    HISTORY OF PRESENT ILLNESS:    Per prior documentation  \"Doe Kapadia is a 85 y.o. male brought in by his wife for evaluation of tachycardia. Wife takes his vitals daily and upon sending them today to the PCP they were concerned about his heart rate being in the 140s, recommended he come to the ER.  Patient has had small amounts of diarrhea over the last day, wife is concerned that he has not been staying hydrated appropriately. He denies fever, chills, nausea, vomiting, abdominal pain, chest pain, shortness of breath.  He denies any dizziness or lightheadedness but wife does note that he has been somewhat off balance while using his walker today.\"    We have been consulted for elevated troponin, 66-61 (baseline 30-40)    He was admitted for JANIYA and tachycardia     Patient completely asymptomatic, CXR negative     Patient follows with [facility]     Past Medical History:   has a past medical history of Arthritis, CAD (coronary artery disease), CHF (congestive heart failure) (HCC), Diabetes mellitus (HCC), History of blood transfusion, Hyperlipidemia, Hypertension, PVC (premature ventricular contraction), SOB (shortness of breath), and Thyroid disease.    Past Surgical History:   has a past surgical history that includes Colonoscopy (10/05/2011); Colonoscopy (03/07/2006); Colonoscopy (11/14/2001); Cardiac surgery; joint replacement (Left); eye surgery; Coronary angioplasty with stent (05/06/2022); ERCP (N/A, 12/23/2024); Coronary artery bypass graft; Multiple

## 2025-04-15 NOTE — DISCHARGE INSTR - COC
Continuity of Care Form    Patient Name: Doe Kapadia   :  1940  MRN:  [id number]    Admit date:  2025  Discharge date:  ***    Code Status Order: Full Code   Advance Directives:     Admitting Physician:  Shayne Haney MD  PCP: Zulma Hill MD    Discharging Nurse: ***  Discharging Hospital Unit/Room#:   Discharging Unit Phone Number: ***    Emergency Contact:   Extended Emergency Contact Information  Primary Emergency Contact: Corine Kapadia  Address: [address]  Home Phone: [phone]  Work Phone: [phone]  Mobile Phone: [phone]  Relation: Spouse  Secondary Emergency Contact: DavidDanica  Home Phone: [phone]  Work Phone: [phone]  Mobile Phone: [phone]  Relation: Child    Past Surgical History:  Past Surgical History:   Procedure Laterality Date    CARDIAC SURGERY      COLONOSCOPY  10/05/2011    normal    COLONOSCOPY  2006    small internal hemorrhoids, diverticulosis    COLONOSCOPY  2001    small internal hemorrhoids, hyperplastic polyp    CORONARY ANGIOPLASTY WITH STENT PLACEMENT  05/06/2022    x2    CORONARY ARTERY BYPASS GRAFT      x2    ERCP N/A 2024    ENDOSCOPIC RETROGRADE CHOLANGIOPANCREATOGRAPHY STONE REMOVAL performed by Santy Gurrola [facility] at [facility]    ERCP N/A 2025    ENDOSCOPIC RETROGRADE CHOLANGIOPANCREATOGRAPHY WITH BALLON DILATION AND STENT REMOVAL performed by Santy Gurrola MD at [facility]    EYE SURGERY      Cataract    JOINT REPLACEMENT Left     Knee     MULTIPLE TOOTH EXTRACTIONS         Immunization History:   Immunization History   Administered Date(s) Administered    COVID-19, MODERNA BLUE border, Primary or Immunocompromised, (age 12y+), IM, 100 mcg/0.5mL 2021, 2021    COVID-19, MODERNA Bivalent, (age 12y+), IM, 50 mcg/0.5 mL 2022    COVID-19, PFIZER, , (age 12y+), IM, 30mcg/0.3mL 2023, 10/03/2024    Influenza A ([id number])

## 2025-04-15 NOTE — CARE COORDINATION
Case Management Assessment  Initial Evaluation    Date/Time of Evaluation: 4/15/2025 8:55 AM  Assessment Completed by: Sia Louie RN    If patient is discharged prior to next notation, then this note serves as note for discharge by case management.    Patient Name: Doe Kapadia                   YOB: 1940  Diagnosis: Tachycardia [R00.0]  JANIYA (acute kidney injury) [N17.9]                   Date / Time: 4/14/2025  3:14 PM    Patient Admission Status: Inpatient   Readmission Risk (Low < 19, Mod (19-27), High > 27): Readmission Risk Score: 14.4    Current PCP: Zulma Hill MD  PCP verified by CM? Yes    Chart Reviewed: Yes      History Provided by: Patient  Patient Orientation: Alert and Oriented    Patient Cognition: Alert    Hospitalization in the last 30 days (Readmission):  No    If yes, Readmission Assessment in CM Navigator will be completed.    Advance Directives:      Code Status: Full Code   Patient's Primary Decision Maker is: Legal Next of Kin    Primary Decision Maker: Corine Kapadia - Spouse - [phone]    Discharge Planning:    Patient lives with: Spouse/Significant Other Type of Home: House  Primary Care Giver: Self  Patient Support Systems include: Spouse/Significant Other, Children   Current Financial resources: Medicare  Current community resources: Other (Comment) (Remote Patient Monitoring)  Current services prior to admission: Durable Medical Equipment            Current DME: Walker, Cane            Type of Home Care services:  OT, PT, Nursing Services    ADLS  Prior functional level: Assistance with the following:, Mobility  Current functional level: Assistance with the following:, Mobility    PT AM-PAC: 16 /24  OT AM-PAC:   /24    Family can provide assistance at DC: Yes  Would you like Case Management to discuss the discharge plan with any other family members/significant others, and if so, who? Yes (Spouse; Corine)  Plans to Return to Present Housing: Yes  Other

## 2025-04-15 NOTE — PLAN OF CARE
Problem: Chronic Conditions and Co-morbidities  Goal: Patient's chronic conditions and co-morbidity symptoms are monitored and maintained or improved  Outcome: Progressing  Flowsheets (Taken 4/15/2025 0433)  Care Plan - Patient's Chronic Conditions and Co-Morbidity Symptoms are Monitored and Maintained or Improved:   Monitor and assess patient's chronic conditions and comorbid symptoms for stability, deterioration, or improvement   Collaborate with multidisciplinary team to address chronic and comorbid conditions and prevent exacerbation or deterioration   Update acute care plan with appropriate goals if chronic or comorbid symptoms are exacerbated and prevent overall improvement and discharge     Problem: Discharge Planning  Goal: Discharge to home or other facility with appropriate resources  Outcome: Progressing  Flowsheets (Taken 4/15/2025 0433)  Discharge to home or other facility with appropriate resources:   Identify barriers to discharge with patient and caregiver   Arrange for needed discharge resources and transportation as appropriate   Identify discharge learning needs (meds, wound care, etc)   Refer to discharge planning if patient needs post-hospital services based on physician order or complex needs related to functional status, cognitive ability or social support system     Problem: Safety - Adult  Goal: Free from fall injury  Outcome: Progressing  Flowsheets (Taken 4/15/2025 0433)  Free From Fall Injury: Instruct family/caregiver on patient safety

## 2025-04-15 NOTE — PROGRESS NOTES
[facility]   Occupational Therapy Evaluation  Date: 4/15/25  Patient Name: Doe Kapadia       Room:   MRN: [id number]  Account: [id number]   : 1940  (85 y.o.) Gender: male     Discharge Recommendations:  Further Occupational Therapy is recommended upon facility discharge.    OT Equipment Recommendations  Other: TBD    Referring Practitioner: Shayne Haney MD  Diagnosis: Tachycardia        Treatment Diagnosis: Impaired self care status    Past Medical History:  has a past medical history of Arthritis, CAD (coronary artery disease), CHF (congestive heart failure) (HCC), Diabetes mellitus (HCC), History of blood transfusion, Hyperlipidemia, Hypertension, PVC (premature ventricular contraction), SOB (shortness of breath), and Thyroid disease.    Past Surgical History:   has a past surgical history that includes Colonoscopy (10/05/2011); Colonoscopy (2006); Colonoscopy (2001); Cardiac surgery; joint replacement (Left); eye surgery; Coronary angioplasty with stent (2022); ERCP (N/A, 2024); Coronary artery bypass graft; Multiple tooth extractions; and ERCP (N/A, 2025).    Restrictions  Restrictions/Precautions  Restrictions/Precautions: Fall Risk, General Precautions  Required Braces or Orthoses?: No  Implants Present? : Metal implants (L TKA)      Vitals  Vitals  O2 Device: None (Room air)     Subjective  Subjective: \"I need to use to bathroom.\" Pt was pleasant and agreeable to OT/PT eval  Comments: OK per SAMM Guzman for OT/PT eval  Pain  Pre-Pain: 0  Post-Pain: 0    Social/Functional History  Social/Functional History  Lives With: Spouse  Type of Home: House  Home Layout: One level  Home Access: Stairs to enter with rails  Entrance Stairs - Number of Steps: 2 ROMI  Entrance Stairs - Rails: Left  Bathroom Shower/Tub: Walk-in shower, Doors  Bathroom Toilet: Standard  Bathroom Equipment: Built-in shower seat, Grab bars in shower, Hand-held shower, 
IV removed. Discharge instructions given. Denies any questions at this time. Wife at bedside.    Electronically signed by Megan Weinberg RN on 4/15/25 at 6:59 PM EDT   
Pharmacy Medication History Note      List of current medications patient is taking is complete.    Source of information: Sure Scripts, OARRS, Care Everywhere     Changes made to medication list:  Medications removed (include reason, ex. therapy complete or physician discontinued, noncompliance):  None     Medications flagged for provider review:  Cholecalciferol 1000 units - now taking 5000 units. Please remove old order.     Medications added/doses adjusted:  None     Other notes (ex. Recent course of antibiotics, Coumadin dosing):  OARRS negative       Current Home Medication List at Time of Admission:  Prior to Admission medications    Medication Sig   levothyroxine (SYNTHROID) 88 MCG tablet Take 1 tablet by mouth daily 1 tablet daily   rosuvastatin calcium 40 MG CPSP Take 20 mg by mouth daily   metFORMIN (GLUCOPHAGE) 1000 MG tablet Take 0.5 tablets by mouth 2 times daily (with meals)   Vitamin D (CHOLECALCIFEROL) 25 MCG (1000 UT) TABS tablet Take 1 tablet by mouth daily  Patient not taking: Reported on 4/14/2025   vitamin D (VITAMIN D3) 125 MCG (5000 UT) CAPS capsule Take 1 capsule by mouth every morning   losartan (COZAAR) 50 MG tablet Take 1 tablet by mouth daily DO NOT TAKE IF BP IS UNDER 110/60   metoprolol succinate (TOPROL XL) 25 MG extended release tablet Take 1 tablet by mouth daily DO NOT TAKE IF BP IS UNDER 110/60   memantine (NAMENDA) 10 MG tablet Take 1 tablet by mouth 2 times daily REPORTS TAKING 0.5 TABLET TWICE DAILY   insulin glargine, 1 unit dial, (TOUJEO SOLOSTAR) 300 UNIT/ML concentrated injection pen Inject 20 Units into the skin every morning   magnesium oxide (MAG-OX) 400 MG tablet Take 1 tablet by mouth 2 times daily   acetaminophen (TYLENOL) 500 MG tablet Take 2 tablets by mouth every 6 hours as needed for Pain   Multiple Vitamins-Minerals (THERAPEUTIC MULTIVITAMIN-MINERALS) tablet Take 1 tablet by mouth daily   aspirin 81 MG chewable tablet Take 1 tablet by mouth every morning 
without using bedrails?: Total  How much help is needed moving to and from a bed to a chair?: A Little  How much help is needed standing up from a chair using your arms?: A Little  How much help is needed walking in hospital room?: A Little  How much help is needed climbing 3-5 steps with a railing?: A Little  AM-[facility] Inpatient Mobility Raw Score : 16  AM-PAC Inpatient T-Scale Score : 40.78  Mobility Inpatient CMS 0-100% Score: 54.16  Mobility Inpatient CMS G-Code Modifier : CK     Goals  Patient Goals   Patient Goals : To go home  Short Term Goals  Time Frame for Short Term Goals: 3-4 visits  Short Term Goal 1: Pt to complete bed mobility sup<>sit independently  Short Term Goal 2: Pt to complete sit to stand and stand pivot transfers with supervision assist  Short Term Goal 3: Pt to ambulate with RW for 150 feet with supervision assist  Short Term Goal 4: Pt to negotiate 2 steps with left rail SBA  Short Term Goal 5: Pt to tolerate standing for 3-4 min with F+ dynamic balance in order to reduce fall risk      Plan  Physical Therapy Plan  General Plan: 5-7 times per week  Current Treatment Recommendations: Strengthening, Balance training, Functional mobility training, Transfer training, Gait training, Stair training, Neuromuscular re-education, Therapeutic activities   Safety Devices  Type of Devices: Bed alarm in place, Call light within reach, Gait belt, Patient at risk for falls, Left in bed, Nurse notified       PT Individual Minutes  Time In: 0845  Time Out: 0908  Minutes: 23   Time Code Minutes  Timed Code Treatment Minutes: 15 Minutes   Co-treatment with OT warranted secondary to decreased safety and independence requiring 2 skilled therapy professionals to address individual discipline's goals. PT addressing transfer training, bed mobility training.       Electronically signed by Whitney Harrison PT on 4/15/25 at 10:04 AM EDT

## 2025-04-15 NOTE — ED NOTES
Report given to SAMM avery from Meds Surg.   Report method by phone   The following was reviewed with receiving RN:   Current vital signs:  /81   Pulse (!) 114   Temp 98 °F (36.7 °C)   Resp 18   Ht 1.702 m (5' 7\")   Wt 67.6 kg (149 lb)   SpO2 98%   BMI 23.34 kg/m²                MEWS Score: 3     Any medication or safety alerts were reviewed. Any pending diagnostics and notifications were also reviewed, as well as any safety concerns or issues, abnormal labs, abnormal imaging, and abnormal assessment findings. Questions were answered.

## 2025-04-16 ENCOUNTER — CARE COORDINATION (OUTPATIENT)
Dept: CARE COORDINATION | Age: 85
End: 2025-04-16

## 2025-04-16 ENCOUNTER — CARE COORDINATION (OUTPATIENT)
Dept: CASE MANAGEMENT | Age: 85
End: 2025-04-16

## 2025-04-16 DIAGNOSIS — R00.0 TACHYCARDIA: ICD-10-CM

## 2025-04-16 DIAGNOSIS — R19.7 DIARRHEA, UNSPECIFIED TYPE: ICD-10-CM

## 2025-04-16 DIAGNOSIS — N17.9 AKI (ACUTE KIDNEY INJURY): ICD-10-CM

## 2025-04-16 DIAGNOSIS — R31.9 HEMATURIA, UNSPECIFIED TYPE: Primary | ICD-10-CM

## 2025-04-16 LAB
EKG ATRIAL RATE: 116 BPM
EKG P-R INTERVAL: 136 MS
EKG Q-T INTERVAL: 374 MS
EKG QRS DURATION: 112 MS
EKG QTC CALCULATION (BAZETT): 519 MS
EKG R AXIS: -41 DEGREES
EKG T AXIS: 35 DEGREES
EKG VENTRICULAR RATE: 116 BPM

## 2025-04-16 PROCEDURE — 1111F DSCHRG MED/CURRENT MED MERGE: CPT | Performed by: FAMILY MEDICINE

## 2025-04-16 NOTE — CARE COORDINATION
4/16/2025 11:41 AM  *  RPM Alert   Remote Patient Monitoring Note      Date/Time:  4/16/2025 11:46 AM  Patient Current Location: [place]  CTN attempted to contact patient by telephone regarding red alert received for pulse ox reading (82/103). LM asking for pt to return call and will reach out again today.     Background: CHF    Plan/Follow Up: Will continue to review, monitor and address alerts with follow up based on severity of symptoms and risk factors.

## 2025-04-16 NOTE — CARE COORDINATION
Ambulatory Care Coordination Note     2025 2:09 PM     Patient Current Location:  Home: [facility] [address] [place] [address]     ACM contacted the spouse/partner by telephone. Verified name and  with spouse/partner as identifiers.         ACM: AYUSH DELANEY RN     Challenges to be reviewed by the provider   Additional needs identified to be addressed with provider No  none               Method of communication with provider: none.    Utilization: Has the patient been discharged from the hospital since your last call? yes -   Call within 2 business days of discharge: Yes    Patient: Doe Kapadia    Patient : 1940   MRN: [id number]    Reason for Admission: tachycardia  Discharge Date: 4/15/25  RURS: Readmission Risk Score: 15.1      Last Discharge Facility       Date Complaint Diagnosis Description Type Department Provider    25 Tachycardia Tachycardia ... ED to Hosp-Admission (Discharged) (ADMITTED) [facility] Shayne Haney MD; Cordell Boone...            Was this an external facility discharge? No    Ambulatory Care Manager reviewed discharge instructions, medical action plan, and red flags with spouse/partner. The spouse/partner was given an opportunity to ask questions; all questions answered at this time.. The spouse/partner verbalized understanding.   Were discharge instructions available to patient? Yes.   Reviewed appropriate site of care based on symptoms and resources available to patient including: PCP  Home health  When to call 911. The spouse/partner agrees to contact the primary care provider and/or specialist office for questions related to their healthcare.     Patients top risk factors for readmission: continued weakness    Hospital follow up appointment: Discussed follow up appointments. Patient has hospital follow up appointment routed to PCP staff for scheduling due to no available appointments.     *[facility] Hospital Follow-up    Discharge date: 4.15.25    Discharge hospital:

## 2025-04-16 NOTE — DISCHARGE SUMMARY
[facility]  Family Medicine      Discharge Summary      NAME:  Doe Kapadia  :  1940  MRN:  [id number]    Admit date:  2025  Discharge date:  4/15/2025    Admitting Physician:  Shayne Haney MD    Primary Diagnosis on Admission:   Present on Admission:   JANIYA (acute kidney injury)   Acquired hypothyroidism   Type 2 diabetes mellitus without complication, with long-term current use of insulin   Diarrhea   Tachycardia   Hematuria      Secondary Diagnoses:  has Unstable angina (HCC) and Coronary artery disease involving coronary bypass graft of native heart without angina pectoris on their pertinent problem list.      Admission Condition:  fair     Discharged Condition: good      Hospital Course:   The patient was admitted to observation for the management of tachycardia and JANIYA secondary to diarrhea. Pt improved significantly with IV hydration, cardio recommends holding diuretics. Pt did have some blood in his urine but otherwise no bacteria, recommending pt have repeat urinalysis outpatient and consider urology referral if hematuria persists.      The patient was seen and examined at bedside today. Pt feels better with no further complaints. All relevant notes, labs & imaging were reviewed.  The case was discussed with nursing staff. Vitals and Labs are at pts baseline. All consultants involved during this admission are agreeable to discharge.    Consults:  cardiology      Disposition:   home    Instructions to Patient:      Follow up with Zulma Hill MD in  1-2 weeks    Discharge Medications:       Medication List        CHANGE how you take these medications      vitamin D 125 MCG (5000 UT) Caps capsule  Commonly known as: CHOLECALCIFEROL  What changed: Another medication with the same name was removed. Continue taking this medication, and follow the directions you see here.            CONTINUE taking these medications      acetaminophen 500 MG tablet  Commonly known as:

## 2025-04-17 ENCOUNTER — CARE COORDINATION (OUTPATIENT)
Dept: CASE MANAGEMENT | Age: 85
End: 2025-04-17

## 2025-04-17 ENCOUNTER — CARE COORDINATION (OUTPATIENT)
Dept: CARE COORDINATION | Age: 85
End: 2025-04-17

## 2025-04-17 NOTE — CARE COORDINATION
Second outreach attempt made to patient and VM left. Will route to [facility] for additional follow up.

## 2025-04-17 NOTE — CARE COORDINATION
Remote Patient Monitoring Note      Date/Time:  4/17/2025 1:25 PM  Patient Current Location: Home: [address] [place] [address]  ACM attempted to contact patient by telephone regarding red alert received for weight increase (5 lbs in 7 days ).   Background: Monitoring states CHF in HRS. Enrollment notes state HTN will confirm with RPM NP.   Clinical Interventions:  RN attempted to reach patient to discuss asymptomatic alert for weight increase. Patient did nto answer and  VM left requesting return call. Per review of LPN assessment patient is dong well, no s/s of concern, eating more an has established HHC. Patient being monitored for CHF however not noted in his problem list. Patient is also not on any diuretics according to his med list. Will confirm with patient when call is returned.     Plan/Follow Up: Will continue to review, monitor and address alerts with follow up based on severity of symptoms and risk factors.

## 2025-04-17 NOTE — CARE COORDINATION
-Remote Alert Monitoring Note      Date/Time:  2025 10:38 AM  Patient Current Location: Home: [address] [place] [address]  Verified patients name and  as identifiers.    Rpm alert to be reviewed by the provider   red alert  weight (5 lbs in 7 days )  Additional needs to be addressed by provider: -spoke to patients wife Corine he is doing well since his hospital visit a few days ago, she denies any swelling in feet, legs hands and abdomen, Corine states patient has been eating a lot better lately, they do now have HHC through [facility] , she states no concerns at present time                    LPN contacted patient by telephone regarding red alert received   Background: CHF  Refer to 911 immediately if:  Patient unresponsive or unable to provide history  Change in cognition or sudden confusion  Patient unable to respond in complete sentences  Intense chest pain/tightness  Any concern for any clinical emergency  Red Alert: Provider response time of 1 hr required for any red alert requiring intervention  Yellow Alert: Provider response time of 3hr required for any escalated yellow alert  Patient Chief Complaint:  Weight Weight Scale Triage  Was your weight obtained upon rising/waking today? yes   Was your weight obtained after voiding and/or use of the bathroom today? yes   Did you weigh yourself in the same amount of clothing today, compared to how you typically do? yes   Was the scale bumped or moved prior to today's weight? no   Is your scale on a flat/hard surface? yes   Did you obtain your weight with shoes on? no   If yes, is this something you normally do during your daily weights? no   Were you standing up straight on the scale today? yes   Were you leaning on anything while obtaining your weight today? no   Weight Education Provided to Patient or Caregiver:   Patient to weigh on the same scale at the same time each day  Patient to weigh first thing in the morning, after going to the bathroom; before

## 2025-04-24 ENCOUNTER — HOSPITAL ENCOUNTER (OUTPATIENT)
Age: 85
Setting detail: SPECIMEN
Discharge: HOME | End: 2025-04-24

## 2025-04-24 ENCOUNTER — OFFICE VISIT (OUTPATIENT)
Age: 85
End: 2025-04-24
Payer: MEDICARE

## 2025-04-24 VITALS
TEMPERATURE: 97.8 F | HEIGHT: 67 IN | BODY MASS INDEX: 23.54 KG/M2 | DIASTOLIC BLOOD PRESSURE: 56 MMHG | OXYGEN SATURATION: 97 % | SYSTOLIC BLOOD PRESSURE: 101 MMHG | HEART RATE: 63 BPM | WEIGHT: 150 LBS

## 2025-04-24 DIAGNOSIS — K80.50 CHOLEDOCHOLITHIASIS: Primary | ICD-10-CM

## 2025-04-24 DIAGNOSIS — E86.0 DEHYDRATION: ICD-10-CM

## 2025-04-24 DIAGNOSIS — I25.810 CORONARY ARTERY DISEASE INVOLVING CORONARY BYPASS GRAFT OF NATIVE HEART WITHOUT ANGINA PECTORIS: ICD-10-CM

## 2025-04-24 DIAGNOSIS — I50.20 HEART FAILURE WITH REDUCED EJECTION FRACTION (HCC): ICD-10-CM

## 2025-04-24 DIAGNOSIS — R19.4 CHANGE IN BOWEL HABITS: ICD-10-CM

## 2025-04-24 DIAGNOSIS — N17.9 AKI (ACUTE KIDNEY INJURY): ICD-10-CM

## 2025-04-24 DIAGNOSIS — R74.8 ELEVATED LIVER ENZYMES: ICD-10-CM

## 2025-04-24 LAB
ANION GAP SERPL CALCULATED.3IONS-SCNC: 10 MMOL/L (ref 9–16)
BUN SERPL-MCNC: 15 MG/DL (ref 8–23)
CALCIUM SERPL-MCNC: 9.1 MG/DL (ref 8.6–10.4)
CHLORIDE SERPL-SCNC: 100 MMOL/L (ref 98–107)
CO2 SERPL-SCNC: 25 MMOL/L (ref 20–31)
CREAT SERPL-MCNC: 1 MG/DL (ref 0.7–1.2)
GFR, ESTIMATED: 75 ML/MIN/1.73M2
GLUCOSE SERPL-MCNC: 332 MG/DL (ref 74–99)
POTASSIUM SERPL-SCNC: 5.3 MMOL/L (ref 3.7–5.3)
SODIUM SERPL-SCNC: 135 MMOL/L (ref 136–145)

## 2025-04-24 PROCEDURE — 3078F DIAST BP <80 MM HG: CPT | Performed by: SURGERY

## 2025-04-24 PROCEDURE — 1123F ACP DISCUSS/DSCN MKR DOCD: CPT | Performed by: SURGERY

## 2025-04-24 PROCEDURE — 3074F SYST BP LT 130 MM HG: CPT | Performed by: SURGERY

## 2025-04-24 PROCEDURE — 99214 OFFICE O/P EST MOD 30 MIN: CPT | Performed by: SURGERY

## 2025-04-24 NOTE — PATIENT INSTRUCTIONS
Will monitor symptoms at this point.  Stay well hydrated- Pedialyte (check with PCP first), water.  Follow up with Dr. Gurrola as scheduled.

## 2025-04-29 ENCOUNTER — CARE COORDINATION (OUTPATIENT)
Dept: CARE COORDINATION | Age: 85
End: 2025-04-29

## 2025-04-29 NOTE — CARE COORDINATION
Ambulatory Care Coordination Note     2025 1:29 PM     Patient Current Location:  Home: [address] [place] [address]     ACM contacted the spouse/partner by telephone. Verified name and  with spouse/partner as identifiers.         ACM: AYUSH DELANEY RN     Challenges to be reviewed by the provider   Additional needs identified to be addressed with provider No  none               Method of communication with provider: none.    Utilization: Patient has not had any utilization since our last call.     Care Summary Note: Wife denied any needs today from PCP or ACM.   Upcoming appointment reminder provided.   Heart Failure Education outreach Date/Time: 2025 1:30 PM    Ambulatory Care Manager (ACM) contacted the caregiver by telephone to perform Ambulatory Care Coordination. Verified name and  with caregiver as identifiers. Provided introduction to self, and explanation of the Ambulatory Care Manager's role.     ACM reviewed that a Heart Healthy tips for the Summer packet has been sent to [employer]. ACM reviewed CHF zones, daily weights, fluid restriction, the importance of low sodium diet, and healthy tips packet with the caregiver. Instructed caregiver to call their PCP if they have a weight gain of 3 lbs in 2 days or 5 lbs in a week.     Patient reminded that there is a physician on call 24 hours a day / 7 days a week should the patient have questions or concerns. The caregiver verbalized understanding.      Offered patient enrollment in the Remote Patient Monitoring (RPM) program for in-home monitoring: Yes, patient enrolled; current status is activated and monitoring.   Current Patient Metrics ---- Blood Pressure: 115/53, 97bpm Pulseox: -%, -bpm Weight: 144.4lbs Note Created at: 2025 12:54 PM   Assessments Completed:   Congestive Heart Failure Assessment    Do you salt your food before tasting it?: No     No patient-reported symptoms      Symptoms:     Symptom course: stable  Weight trend:

## 2025-05-02 ENCOUNTER — OFFICE VISIT (OUTPATIENT)
Dept: GASTROENTEROLOGY | Age: 85
End: 2025-05-02
Payer: MEDICARE

## 2025-05-02 VITALS
RESPIRATION RATE: 16 BRPM | HEIGHT: 67 IN | SYSTOLIC BLOOD PRESSURE: 122 MMHG | BODY MASS INDEX: 23.18 KG/M2 | HEART RATE: 67 BPM | WEIGHT: 147.7 LBS | TEMPERATURE: 98.2 F | DIASTOLIC BLOOD PRESSURE: 78 MMHG | OXYGEN SATURATION: 98 %

## 2025-05-02 DIAGNOSIS — K80.50 CHOLEDOCHOLITHIASIS: Primary | ICD-10-CM

## 2025-05-02 PROCEDURE — 3078F DIAST BP <80 MM HG: CPT | Performed by: INTERNAL MEDICINE

## 2025-05-02 PROCEDURE — 1126F AMNT PAIN NOTED NONE PRSNT: CPT | Performed by: INTERNAL MEDICINE

## 2025-05-02 PROCEDURE — 3074F SYST BP LT 130 MM HG: CPT | Performed by: INTERNAL MEDICINE

## 2025-05-02 PROCEDURE — 1159F MED LIST DOCD IN RCRD: CPT | Performed by: INTERNAL MEDICINE

## 2025-05-02 PROCEDURE — 99213 OFFICE O/P EST LOW 20 MIN: CPT | Performed by: INTERNAL MEDICINE

## 2025-05-02 PROCEDURE — 1123F ACP DISCUSS/DSCN MKR DOCD: CPT | Performed by: INTERNAL MEDICINE

## 2025-05-02 NOTE — PROGRESS NOTES
tablet by mouth daily, Disp: 30 tablet, Rfl: 5    aspirin 81 MG chewable tablet, Take 1 tablet by mouth every morning, Disp: , Rfl:     ALLERGIES:   No Known Allergies    FAMILY HISTORY:       Family history unknown: Yes         SOCIAL HISTORY:   Social History     Socioeconomic History    Marital status:      Spouse name: Not on file    Number of children: Not on file    Years of education: Not on file    Highest education level: Not on file   Occupational History    Not on file   Tobacco Use    Smoking status: Former     Current packs/day: 0.00     Average packs/day: 0.5 packs/day for 9.0 years (4.5 ttl pk-yrs)     Types: Pipe, Cigarettes     Start date: 1974     Quit date: 1983     Years since quittin.6    Smokeless tobacco: Never   Vaping Use    Vaping status: Never Used   Substance and Sexual Activity    Alcohol use: Yes     Comment: Social     Drug use: No    Sexual activity: Defer   Other Topics Concern    Not on file   Social History Narrative    Not on file     Social Drivers of Health     Financial Resource Strain: Low Risk  (5/3/2024)    Overall Financial Resource Strain (CARDIA)     Difficulty of Paying Living Expenses: Not hard at all   Food Insecurity: No Food Insecurity (2025)    Hunger Vital Sign     Worried About Running Out of Food in the Last Year: Never true     Ran Out of Food in the Last Year: Never true   Transportation Needs: No Transportation Needs (2025)    PRAPARE - Transportation     Lack of Transportation (Medical): No     Lack of Transportation (Non-Medical): No   Physical Activity: Inactive (2024)    Exercise Vital Sign     Days of Exercise per Week: 0 days     Minutes of Exercise per Session: 0 min   Stress: Not on file   Social Connections: Not on file   Intimate Partner Violence: Not on file   Housing Stability: Low Risk  (2025)    Housing Stability Vital Sign     Unable to Pay for Housing in the Last Year: No     Number of Times Moved in the

## 2025-05-16 ENCOUNTER — CARE COORDINATION (OUTPATIENT)
Dept: CASE MANAGEMENT | Age: 85
End: 2025-05-16

## 2025-05-16 NOTE — CARE COORDINATION
-Remote Alert Monitoring Note      Date/Time:  2025 11:41 AM  Patient Current Location: Home: [address] [place] [address]  Verified patients name and  as identifiers.    Rpm alert to be reviewed by the provider   red alert  weight (4 lbs 1 day )    Additional needs to be addressed by provider: No                   LPN contacted patient by telephone regarding red alert received   Background: CHF  Refer to 911 immediately if:  Patient unresponsive or unable to provide history  Change in cognition or sudden confusion  Patient unable to respond in complete sentences  Intense chest pain/tightness  Any concern for any clinical emergency  Red Alert: Provider response time of 1 hr required for any red alert requiring intervention  Yellow Alert: Provider response time of 3hr required for any escalated yellow alert  Patient Chief Complaint:  Weight Weight Scale Triage  Was your weight obtained upon rising/waking today? YES   Was your weight obtained after voiding and/or use of the bathroom today? yes   Did you weigh yourself in the same amount of clothing today, compared to how you typically do? yes   Was the scale bumped or moved prior to today's weight? no   Is your scale on a flat/hard surface? yes   Did you obtain your weight with shoes on? no   If yes, is this something you normally do during your daily weights? no   Were you standing up straight on the scale today? NA   Were you leaning on anything while obtaining your weight today? no   Weight Education Provided to Patient or Caregiver:   Patient to weigh on the same scale at the same time each day  Patient to weigh first thing in the morning, after going to the bathroom; before eating breakfast, and before having anything to drink.  Instructed on importance of  not wearing shoes on the scale, and wearing the same type of clothing each day.  Clinical Interventions: Reviewed and followed up on alerts and treatments-Spoke to Corine hong wife she states

## 2025-05-19 ENCOUNTER — CARE COORDINATION (OUTPATIENT)
Dept: CASE MANAGEMENT | Age: 85
End: 2025-05-19

## 2025-05-19 NOTE — CARE COORDINATION
-Remote Alert Monitoring Note      Date/Time:  2025 9:55 AM  Patient Current Location: Home: [address] [place] [address]  Verified patients name and  as identifiers.    Rpm alert to be reviewed by the provider   red alert  blood pressure heart rate (130) and pulse ox heart rate (126)  Vitals Recheck blood pressure heart rate (na) and pulse ox heart rate (na)  Additional needs to be addressed by provider: No                   LPN contacted patient by telephone regarding red alert received   Background: CHF  Refer to 911 immediately if:  Patient unresponsive or unable to provide history  Change in cognition or sudden confusion  Patient unable to respond in complete sentences  Intense chest pain/tightness  Any concern for any clinical emergency  Red Alert: Provider response time of 1 hr required for any red alert requiring intervention  Yellow Alert: Provider response time of 3hr required for any escalated yellow alert  Patient Chief Complaint:  Heart Rate HR Triage  Are you having any Chest Pain? no   Are you having any Shortness of Breath? no   Are you having any dizziness? no   Are you feeling more fatigued or tired than normal? no   Are you having any other health concerns or issues? no     Clinical Interventions: Reviewed and followed up on alerts and treatments-Spoke to wife Corine she states patient is doing really well today PT is at the home at present time and she states they checked BP and it looks good and HR was only in the 80's she states she will repeat readings when PT leaves but has no concerns a tpresent time     Plan/Follow Up: Will continue to review, monitor and address alerts with follow up based on severity of symptoms and risk factors.  **For any new or worsening symptoms or you are concerned in anyway, please contact your Provider or report to the nearest Emergency Room.**

## 2025-05-21 ENCOUNTER — CARE COORDINATION (OUTPATIENT)
Dept: CARE COORDINATION | Age: 85
End: 2025-05-21

## 2025-05-21 NOTE — CARE COORDINATION
Specialty Dept Phone    7/7/2025 3:00 PM Zulma Hill MD Family Medicine [phone]            Follow Up:   Plan for next ACM outreach in approximately 2 weeks to complete:  - disease specific assessments  - medication review   - advance care planning   - goal progression  - education   - RPM.   Caregiver is agreeable to this plan.

## 2025-05-28 ENCOUNTER — CARE COORDINATION (OUTPATIENT)
Dept: CARE COORDINATION | Facility: CLINIC | Age: 85
End: 2025-05-28

## 2025-05-28 NOTE — CARE COORDINATION
2025 11:17 AM  *  Alert and Triage   -Remote Alert Monitoring Note      Date/Time:  2025 11:17 AM  Patient Current Location: [place]  Verified patients name and  as identifiers.    Rpm alert to be reviewed by the provider   red alert  blood pressure heart rate (121)  Vitals Recheck blood pressure heart rate (TBD)  Additional needs to be addressed by provider: No             LPN attempted to contact patient by telephone regarding red alert received. Spoke with patients emergency contact / spouse, Corine Kapadia.  Background: Pt enrolled in RPM r/t CHF  Refer to 911 immediately if:  Patient unresponsive or unable to provide history  Change in cognition or sudden confusion  Patient unable to respond in complete sentences  Intense chest pain/tightness  Any concern for any clinical emergency  Red Alert: Provider response time of 1 hr required for any red alert requiring intervention  Yellow Alert: Provider response time of 3hr required for any escalated yellow alert  Patient Chief Complaint:  Heart Rate HR Triage (answered on patients behalf by spouse / emergency contact, Corine Kapadia)  Are you having any Chest Pain? no   Are you having any Shortness of Breath? no   Are you having any dizziness? no   Are you feeling more fatigued or tired than normal? no   Are you having any other health concerns or issues? Nothing new     Clinical Interventions: Reviewed and followed up on alerts and treatments- Patients spouse / emergency contact, Corine Kapadia, reports she is aware patients current BP HR is 121. Reports pt denies CP, SOB, dizziness and fatigue more than normal at this time. Pulse ox HR of 109 noted in HRS and is within RPM parameters. Confirms pt is compliant with losartan 50 mg qd and Toprol XL 25 mg qd. States she gave pt losartan around 11:05 this AM (after checking BP) and gives pt Toprol XL in the evening if BP is > 1110/60. States she will assist pt with BP recheck in approx 1 hour from the time pt took

## 2025-05-28 NOTE — CARE COORDINATION
5/28/2025 12:09 PM  *    Remote Patient Monitoring Note      Date/Time:  5/28/2025 12:09 PM  BP HR recheck of 98 noted in HRS and is within RPM parameters. No further outreach by this nurse indicated at this time.   Background: Pt enrolled in RPM r/t CHF  Plan/Follow Up: Will continue to review, monitor and address alerts with follow up based on severity of symptoms and risk factors.

## 2025-06-24 ENCOUNTER — TELEPHONE (OUTPATIENT)
Dept: PRIMARY CARE CLINIC | Age: 85
End: 2025-06-24

## 2025-06-24 ENCOUNTER — CARE COORDINATION (OUTPATIENT)
Dept: CASE MANAGEMENT | Age: 85
End: 2025-06-24

## 2025-06-24 NOTE — CARE COORDINATION
-Remote Alert Monitoring Note      Date/Time:  2025 11:25 AM  Patient Current Location: Home: [address] [place] [address]  Verified patients name and  as identifiers.    Rpm alert to be reviewed by the provider   red alert  weight (5 lb weight increase in 7 days )    Additional needs to be addressed by provider: Spoke with patient wife she states patient is doing well. She denies any swelling in his feet, legs hands or abdomen states he has been eating very well the last week. Denies any complaints of chest pain, SOB, dizziness patient does not take any diuretics she is agreeable to call today                    LPN contacted patient by telephone regarding red alert received   Background: CHF  Refer to 911 immediately if:  Patient unresponsive or unable to provide history  Change in cognition or sudden confusion  Patient unable to respond in complete sentences  Intense chest pain/tightness  Any concern for any clinical emergency  Red Alert: Provider response time of 1 hr required for any red alert requiring intervention  Yellow Alert: Provider response time of 3hr required for any escalated yellow alert  Patient Chief Complaint:  Weight Weight Scale Triage  Was your weight obtained upon rising/waking today? YES   Was your weight obtained after voiding and/or use of the bathroom today? yes   Did you weigh yourself in the same amount of clothing today, compared to how you typically do? yes   Was the scale bumped or moved prior to today's weight? no   Is your scale on a flat/hard surface? yes   Did you obtain your weight with shoes on? no   If yes, is this something you normally do during your daily weights? no   Were you standing up straight on the scale today? yes   Were you leaning on anything while obtaining your weight today? no   Weight Education Provided to Patient or Caregiver:   Patient to weigh on the same scale at the same time each day  Patient to weigh first thing in the morning, after going

## 2025-06-24 NOTE — TELEPHONE ENCOUNTER
06/24/25 1:03 PM     REMOTE PATIENT MONITORING HIGH ALERT RESPONSE         ASSESSMENT AND PLAN   Weight gain  --asymptomatic when RPM LPN called to assess  --weight gain is skewed by lower than average weight 7 days ago  --will continue to monitor for now    CHIEF COMPLAINT    Responding to a high RPM alert for weight gain of 5.2# within the last 7 days. This alert is impacted by weight of 144.8# on 6/17 which is lower than recent average weight by at least 4.6#.          ALLERGIES    No Known Allergies    Tamara Rg DNP, FNP-C, Remote Patient Monitoring NP, (Be) [phone]

## 2025-07-07 ENCOUNTER — CARE COORDINATION (OUTPATIENT)
Dept: CARE COORDINATION | Age: 85
End: 2025-07-07

## 2025-07-09 ENCOUNTER — TELEPHONE (OUTPATIENT)
Dept: PRIMARY CARE CLINIC | Age: 85
End: 2025-07-09

## 2025-07-09 ENCOUNTER — CARE COORDINATION (OUTPATIENT)
Dept: CASE MANAGEMENT | Age: 85
End: 2025-07-09

## 2025-07-09 NOTE — CARE COORDINATION
-Remote Alert Monitoring Note      Date/Time:  2025 11:34 AM  Patient Current Location: Home: [address]  [facility] [address]  Verified patients name and  as identifiers.    Rpm alert to be reviewed by the provider   red alert  weight (3 lbs in 1 day )    Additional needs to be addressed by provider: spoke to patients wife Corine she states he is doing very well . No SOB,chest ain, dizziness no swelling, had a well visit with PCP yesterday  checked for swelling at that time none noted per wife.she states he did have a big dinner lat night and she did not watch him on the scale to note if he was standing correctly. All other metrics WNL today and they have no concerns                     LPN contacted patient by telephone regarding red alert received   Background: CHF  Refer to 911 immediately if:  Patient unresponsive or unable to provide history  Change in cognition or sudden confusion  Patient unable to respond in complete sentences  Intense chest pain/tightness  Any concern for any clinical emergency  Red Alert: Provider response time of 1 hr required for any red alert requiring intervention  Yellow Alert: Provider response time of 3hr required for any escalated yellow alert  Patient Chief Complaint:  Weight Weight Scale Triage  Was your weight obtained upon rising/waking today? YES   Was your weight obtained after voiding and/or use of the bathroom today? NA   Did you weigh yourself in the same amount of clothing today, compared to how you typically do? yes   Was the scale bumped or moved prior to today's weight? NA   Is your scale on a flat/hard surface? yes   Did you obtain your weight with shoes on? no   If yes, is this something you normally do during your daily weights? no   Were you standing up straight on the scale today? NA   Were you leaning on anything while obtaining your weight today? NA   Weight Education Provided to Patient or Caregiver:   Patient to weigh on the same scale at the same  Parent(s)

## 2025-07-09 NOTE — TELEPHONE ENCOUNTER
07/09/25 11:44 AM     REMOTE PATIENT MONITORING HIGH ALERT RESPONSE         ASSESSMENT AND PLAN   Weight gain  --per RPM LPN's note, pt is feeling well today with no concerning symptoms. Wife reports pt had large dinner last night and she did not watch him weigh this morning to be sure he was standing correctly on the scale.   --no call to pt at this time; will continue to monitor.    CHIEF COMPLAINT    Responding to a high RPM alert for weight gain of 3# overnight. Today's weight is 153. Not uncommon for pt to have weight fluctuations of 3# up or down. Weight in PCP OV on 7/7 was 154#. Lungs were clear, heart in sinus rhythm and no edema was present.          ALLERGIES    No Known Allergies    RECENT LABS  Lab Results   Component Value Date/Time     04/24/2025 02:14 PM    K 5.3 04/24/2025 02:14 PM     04/24/2025 02:14 PM    CO2 25 04/24/2025 02:14 PM    BUN 15 04/24/2025 02:14 PM    CREATININE 1.0 04/24/2025 02:14 PM    GLUCOSE 332 04/24/2025 02:14 PM    GLUCOSE 115 05/29/2012 08:18 AM    CALCIUM 9.1 04/24/2025 02:14 PM    LABGLOM 75 04/24/2025 02:14 PM    LABGLOM >60 03/18/2024 07:09 AM    LABGLOM 58 10/16/2023 12:00 AM      Lab Results   Component Value Date/Time    PROBNP 1,546 01/01/2024 06:25 PM    PROBNP 1,154 11/02/2022 01:44 AM    PROBNP 956 05/24/2022 07:58 PM         Tamara Rg DNP, FNP-C, Remote Patient Monitoring NP, (Ph) [phone]

## 2025-07-16 ENCOUNTER — CARE COORDINATION (OUTPATIENT)
Dept: CARE COORDINATION | Age: 85
End: 2025-07-16

## 2025-07-16 NOTE — CARE COORDINATION
Ambulatory Care Coordination Note     7/16/2025 3:04 PM     ACM outreach attempt by this ACM today to perform care management follow up . ACM was unable to reach the patient by telephone today;   left voice message requesting a return phone call to this ACM.     ACM: AYUSH DELANEY RN     Care Summary Note: Requested a call back to discuss possible graduation from [facility].  Current Patient Metrics ---- Blood Pressure: 119/69, 94bpm Pulseox: 98%, 99bpm Weight: 151.2lbs Note Created at: 07/16/2025 11:09 AM    PCP/Specialist follow up:   Future Appointments         Provider Specialty Dept Phone    1/7/2026 2:30 PM Zulma Hill MD Family Medicine [phone]            Follow Up:   Plan for next ACM outreach in approximately 1 week to complete:  - medication review  - advance care planning  - goal progression  - education   - RPM.

## 2025-07-17 ENCOUNTER — CARE COORDINATION (OUTPATIENT)
Dept: CARE COORDINATION | Facility: CLINIC | Age: 85
End: 2025-07-17

## 2025-07-17 NOTE — CARE COORDINATION
2025 11:34 AM  *  Alert and Triage   -Remote Alert Monitoring Note      Date/Time:  2025 11:34 AM  Patient Current Location: Home: [address] [place] [address]  Verified patients name and  as identifiers.    Rpm alert to be reviewed by the provider   red alert  blood pressure reading (143/126)  Vitals Recheck blood pressure reading (134/62)  Additional needs to be addressed by provider: LPN outreached to patient's emergency contact spouse  to discuss blood pressure reading.Spouse denies chest pain, shortness of breath, dizziness, heart racing or vision concerns. Spouse states patient's blood pressure medication was discontinued on Kavita 3, 2025. Spouse agreed to re-check blood pressure during phone call . Blood pressure recheck 134/62 within parameters. Spouse continue to deny s/s of concerns.                   LPN contacted patient by telephone regarding red alert received   Background: CHF  Refer to 911 immediately if:  Patient unresponsive or unable to provide history  Change in cognition or sudden confusion  Patient unable to respond in complete sentences  Intense chest pain/tightness  Any concern for any clinical emergency  Red Alert: Provider response time of 1 hr required for any red alert requiring intervention  Yellow Alert: Provider response time of 3hr required for any escalated yellow alert  Patient Chief Complaint:  Blood Pressure BP Triage  Are you having any Chest Pain? no   Are you having any Shortness of Breath? no   Do you have a headache or have any vision changes? no   Are you having any numbness or tingling? no   Are you having any other health concerns or issues? no  Patient/Caregiver educated on how to properly take a blood pressure. Patient/Caregiver verbalizes understanding.     Clinical Interventions: Reviewed and followed up on alerts and treatments-  LPN outreached to patient's emergency contact spouse  to discuss blood pressure reading. Spouse denies chest pain,

## 2025-07-17 NOTE — CARE COORDINATION
7/17/2025 9:59 AM  *  Unable to Reach Date/Time:  7/17/2025 9:59 AM  LPN attempted to reach patient by telephone regarding red alert for blood pressure (143/126) in remote patient monitoring program. Left HIPAA compliant message requesting a return call. Will attempt to reach patient again.

## 2025-07-23 ENCOUNTER — CARE COORDINATION (OUTPATIENT)
Dept: CARE COORDINATION | Age: 85
End: 2025-07-23

## 2025-07-29 ENCOUNTER — CARE COORDINATION (OUTPATIENT)
Dept: CARE COORDINATION | Age: 85
End: 2025-07-29

## 2025-07-29 ENCOUNTER — CARE COORDINATION (OUTPATIENT)
Dept: PRIMARY CARE CLINIC | Age: 85
End: 2025-07-29

## 2025-07-29 DIAGNOSIS — I50.20 HFREF (HEART FAILURE WITH REDUCED EJECTION FRACTION) (HCC): Primary | ICD-10-CM

## 2025-07-29 NOTE — PROGRESS NOTES
Remote Patient Order Discontinued    Received request from Joy Santiago, RN   to discontinue order for remote patient monitoring of CHF and order completed.

## 2025-07-29 NOTE — CARE COORDINATION
Patient Doe Kapadia  07/29/25     Care Coordination  placed call to patient to arrange RPM kit  through [employer]. Left HIPAA Compliant Message     provided return and how to pack equipment in original packing via the patients voicemail if available and provided call back number should patient have questions.    Patient made aware [employer] will  equipment in 2-4 days.

## 2025-07-29 NOTE — CARE COORDINATION
Heart Failure Assessment    Do you salt your food before tasting it?: No     No patient-reported symptoms      Symptoms:           ,   General Assessment    Do you have any symptoms that are causing concern?: No          Medications Reviewed:   Patient denies any changes with medications and reports taking all medications as prescribed.    Advance Care Planning:   Not on file; education provided     Care Planning:   Education Documentation  No documentation found.  Education Comments  No comments found.     ,    Goals Addressed                   This Visit's Progress     COMPLETED: Conditions and Symptoms   On track     I will schedule office visits, as directed by my provider.  I will keep my appointment or reschedule if I have to cancel.  I will notify my provider of any barriers to my plan of care.  I will follow my Zone Management tool to seek urgent or emergent care.  I will notify my provider of any symptoms that indicate a worsening of my condition.    Barriers: overwhelmed by complexity of regimen  Plan for overcoming my barriers: work with ACM and RPM  Confidence: 10/10  Anticipated Goal Completion Date: 6.27.25                 PCP/Specialist follow up:   Future Appointments         Provider Specialty Dept Phone    1/7/2026 2:30 PM Zulma Hill MD Family Medicine [phone]            Follow Up:   No further Ambulatory Care Management follow-up scheduled at this time.  Caregiver has Ambulatory Care Manager's contact information for any further questions, concerns or needs.

## (undated) DEVICE — SPHINCTEROTOME: Brand: DREAMTOME™ RX 44

## (undated) DEVICE — SYRINGE MED 10ML LUERLOCK TIP W/O SFTY DISP

## (undated) DEVICE — SINGLE USE DISTAL COVER MAJ-2315: Brand: SINGLE USE DISTAL COVER

## (undated) DEVICE — RETRIEVAL BALLOON CATHETER: Brand: EXTRACTOR™ PRO RX

## (undated) DEVICE — SNARE ENDOSCP L240CM LOOP W13MM DIA2.4MM SHT THROW SM OVL

## (undated) DEVICE — ERBE NESSY® OMEGA PLATE USA (85+23)CM² , WITH CABLE 3 M: Brand: ERBE

## (undated) DEVICE — GLOVE ORANGE PI 7 1/2   MSG9075

## (undated) DEVICE — DEFENDO AIR WATER SUCTION AND BIOPSY VALVE KIT FOR  OLYMPUS: Brand: DEFENDO AIR/WATER/SUCTION AND BIOPSY VALVE

## (undated) DEVICE — ESOPHAGEAL/COLONIC WIREGUIDED BALLOON DILATATION CATHETER: Brand: CRE WIREGUIDED

## (undated) DEVICE — BITEBLOCK 54FR W/ DENT RIM BLOX

## (undated) DEVICE — ENDO KIT W/SYRINGE: Brand: MEDLINE INDUSTRIES, INC.

## (undated) DEVICE — SYRINGE INFL 60ML DISP ALLIANCE II

## (undated) DEVICE — SYSTEM BX CAP BILI RAP EXCHG CAP LOK DEV COMPATIBLE W/ OLY

## (undated) DEVICE — BALLOON DILATATION CATHETER: Brand: HURRICANE™ RX